# Patient Record
Sex: MALE | Race: WHITE | Employment: UNEMPLOYED | URBAN - METROPOLITAN AREA
[De-identification: names, ages, dates, MRNs, and addresses within clinical notes are randomized per-mention and may not be internally consistent; named-entity substitution may affect disease eponyms.]

---

## 2017-09-01 PROBLEM — K52.9 GASTROENTERITIS: Status: ACTIVE | Noted: 2017-09-01

## 2017-09-01 PROBLEM — D72.829 LEUKOCYTOSIS: Status: ACTIVE | Noted: 2017-09-01

## 2017-09-01 PROBLEM — R11.2 NAUSEA & VOMITING: Status: ACTIVE | Noted: 2017-09-01

## 2017-09-01 PROBLEM — F12.90 MARIJUANA SMOKER: Status: ACTIVE | Noted: 2017-09-01

## 2019-02-27 ENCOUNTER — HOSPITAL ENCOUNTER (EMERGENCY)
Age: 54
Discharge: HOME | End: 2019-02-27
Attending: EMERGENCY MEDICINE
Payer: COMMERCIAL

## 2019-02-27 VITALS
SYSTOLIC BLOOD PRESSURE: 119 MMHG | OXYGEN SATURATION: 96 % | TEMPERATURE: 97.5 F | RESPIRATION RATE: 18 BRPM | HEART RATE: 99 BPM | DIASTOLIC BLOOD PRESSURE: 72 MMHG

## 2019-02-27 DIAGNOSIS — T40.2X1A OPIOID OVERDOSE, ACCIDENTAL OR UNINTENTIONAL, INITIAL ENCOUNTER (HCC): Primary | ICD-10-CM

## 2019-02-27 PROCEDURE — 99283 EMERGENCY DEPT VISIT LOW MDM: CPT

## 2019-06-26 ENCOUNTER — HOSPITAL ENCOUNTER (EMERGENCY)
Age: 54
Discharge: HOME | End: 2019-06-26
Payer: MEDICARE

## 2019-06-26 VITALS
HEART RATE: 86 BPM | OXYGEN SATURATION: 93 % | BODY MASS INDEX: 22.9 KG/M2 | HEIGHT: 70 IN | TEMPERATURE: 98.4 F | RESPIRATION RATE: 16 BRPM | SYSTOLIC BLOOD PRESSURE: 103 MMHG | DIASTOLIC BLOOD PRESSURE: 76 MMHG | WEIGHT: 160 LBS

## 2019-06-26 DIAGNOSIS — F11.10 HEROIN ABUSE (HCC): Primary | ICD-10-CM

## 2019-06-26 PROCEDURE — 99284 EMERGENCY DEPT VISIT MOD MDM: CPT

## 2019-06-26 ASSESSMENT — ENCOUNTER SYMPTOMS
COLOR CHANGE: 0
ABDOMINAL PAIN: 0
ABDOMINAL DISTENTION: 0
NAUSEA: 0
VOMITING: 0
EYES NEGATIVE: 1
SHORTNESS OF BREATH: 0
BACK PAIN: 0

## 2019-06-27 NOTE — ED NOTES
Patient discharged in stable condition to police custody. Denied questions regarding GI follow up at time of discharge.       Romario Brand RN  06/26/19 9547

## 2019-06-27 NOTE — ED PROVIDER NOTES
[address] [facility]  ED  EMERGENCY DEPARTMENT ENCOUNTER        Pt Name: Ana Zafar  MRN: [id number]  [place] 1965  Date of evaluation: 6/26/2019  Provider: Mirna Schulz PA-C  PCP: No primary care provider on file. ED Attending: Merwyn Boast, MD      This patient was NOT seen by the attending provider     History provided by the patient and police officers    CHIEF COMPLAINT:     Chief Complaint   Patient presents with    Drug Overdose     [place]. Known user, last use 2 days ago. Wanted to come in to get \"checked out\" Nothing given by EMS       HISTORY OF PRESENT ILLNESS:      Ana Zafar is a 52 y.o. male who arrives to the ED by [facility]. The patient was brought in after having used heroin. He admits to heroin use/abuse for approximately 10 years. Police/EMS received a call regarding a man that was shooting up behind [employer]. They found the patient with a tourniquet to his right upper arm in the needle at the antecubital space. The patient never had to be given Narcan and was reportedly checked out and cleared by EMS in terms of his need to come to the ED for an overdose. The patient however upon being arrested said he wanted to come to the ED to be evaluated for possible jaundice. He tells me he saw his PCP 2 days ago and was told he looked jaundiced and should get it checked out. The patient however has no notable yellowing of his skin at this time. He denies being in any pain. Specifically he denies any chest pain, abdominal pain, nausea or vomiting. He has no other complaints but police brought him to be checked out based on his statement and concerns. Nursing Notes were reviewed     REVIEW OF SYSTEMS:     Review of Systems   Constitutional: Negative for appetite change, chills and fever. HENT: Negative. Eyes: Negative. Respiratory: Negative for shortness of breath. Cardiovascular: Negative for chest pain. CC: f/u for pneumonia    Patient still on BiPAP, 100% FiO2, but alert, responds appropriately, less distress, no tachycardia    REVIEW OF SYSTEMS:  All other review of systems negative (Comprehensive ROS)- limited    Antimicrobials Day # 3 Vanco + Cefepime  Azithro added  Medications Reviewed    Vital Signs Last 24 Hrs  T(F): 97.7 (21 Oct 2018 12:23), Max: 99.3 (20 Oct 2018 21:15)  HR: 97 (21 Oct 2018 13:02) (96 - 117)  BP: 148/75 (21 Oct 2018 12:23) (124/78 - 148/75)  BP(mean): --  RR: 18 (21 Oct 2018 12:23) (18 - 18)  SpO2: 95% (21 Oct 2018 13:02) (89% - 95%)    PHYSICAL EXAM:  General: alert, on BiPAP  Eyes:  anicteric, no conjunctival injection, no discharge  Oropharynx: no lesions or injection 	  Neck: supple, without adenopathy  Lungs: rhonchi anteriorly   Heart: regular rate and rhythm; no murmur, rubs or gallops  Abdomen: soft, nondistended, nontender, without mass or organomegaly  Skin: no lesions  Extremities: L LE contracture,   Neurologic: alert, cooperative, L weakness    LAB RESULTS:                        11.4   19.28 )-----------( 298      ( 21 Oct 2018 08:18 )             36.5   10-21    146<H>  |  105  |  18  ----------------------------<  180<H>  3.2<L>   |  24  |  0.79    Ca    9.5      21 Oct 2018 07:08    TPro  7.7  /  Alb  3.1<L>  /  TBili  0.4  /  DBili  x   /  AST  15  /  ALT  17  /  AlkPhos  120  10-20      MICROBIOLOGY:  RECENT CULTURES:  Culture - Blood in AM (10.17.18 @ 15:39)    Specimen Source: .Blood Blood    Culture Results:   No growth to date.    10-16 @ 13:21 .Blood Blood-Peripheral     No growth to date.    10-16 @ 13:20 .Blood Blood-Peripheral     No growth to date.    10-15 @ 16:54 .Urine Clean Catch (Midstream)     <10,000 CFU/ml Normal Urogenital ck present    Culture - Blood (10.15.18 @ 15:38)    Gram Stain:   Growth in aerobic bottle: Gram Positive Cocci in Clusters  Growth in anaerobic bottle: Gram Positive Cocci in Clusters    Specimen Source: .Blood Blood-Peripheral    Culture Results:   Growth in aerobic and anaerobic bottles: Staphylococcus haemolyticus  See previous culture [id number]    Culture - Blood (10.15.18 @ 15:38)    -  Cefazolin: S <=4    -  Erythromycin: R >4    -  Ampicillin/Sulbactam: S <=8/4    -  Clindamycin: S <=0.25    -  RIF- Rifampin: S <=1 Should not be used as monotherapy    -  Tetra/Doxy: S <=1    -  Gentamicin: S <=1 Should not be used as monotherapy    -  Oxacillin: S <=0.25    -  Trimethoprim/Sulfamethoxazole: S <=0.5/9.5    -  Vancomycin: S 2    Gram Stain:   Growth in aerobic bottle: Gram Positive Cocci in Clusters  Growth in anaerobic bottle: Gram Positive Cocci in Clusters    -  Methicillin resistant Staphylococcus aureus (MRSA): Detec    Specimen Source: .Blood Blood-Peripheral    Organism: Coag Negative Staphylococcus    Organism: Blood Culture PCR    Culture Results:   Growth in aerobic and anaerobic bottles: Staphylococcus haemolyticus  Growth in anaerobic bottle: Staphylococcus epidermidis "Susceptibilities  not performed"        RADIOLOGY REVIEWED:  Xray Chest 1 View-PORTABLE IMMEDIATE (10.20.18 @ 11:52) >  There are bilateral lower lobe opacities consistent with atelectasis -   however pneumonia cannot be excluded in the proper clinical context.  No   significant interval change from prior examination.    CT Chest No Cont (10.16.18 @ 22:21) >  Multifocal pneumonia. Follow-up to resolution recommended.    Indeterminate right breast nodule is new from the prior study,   ultrasound/mammography needed for complete evaluation. Gastrointestinal: Negative for abdominal distention, abdominal pain, nausea and vomiting. Genitourinary: Negative for hematuria. Musculoskeletal: Negative for back pain, gait problem and neck pain. Skin: Negative for color change. Neurological: Negative for dizziness and headaches. All other systems reviewed and are negative. Exceptas noted above in the ROS, all other systems were reviewed and negative. PAST MEDICAL HISTORY:     Past Medical History:   Diagnosis Date    Back pain     Hepatitis B surface antigen positive 08/31/2017    Hepatitis C antibody positive in blood 08/31/2017         SURGICAL HISTORY:      Past Surgical History:   Procedure Laterality Date    KIDNEY REMOVAL Right     WRIST SURGERY           CURRENT MEDICATIONS:       Previous Medications    No medications on file         ALLERGIES:    Codeine    FAMILY HISTORY:     History reviewed. No pertinent family history. SOCIAL HISTORY:       Social History     Socioeconomic History    Marital status:       Spouse name: None    Number of children: None    Years of education: None    Highest education level: None   Occupational History    None   Social Needs    Financial resource strain: None    Food insecurity:     Worry: None     Inability: None    Transportation needs:     Medical: None     Non-medical: None   Tobacco Use    Smoking status: Current Every Day Smoker     Packs/day: 1.00     Types: Cigarettes    Smokeless tobacco: Never Used   Substance and Sexual Activity    Alcohol use: No    Drug use: Yes     Types: IV, Cocaine, Opiates , Marijuana     Comment: heroine    Sexual activity: Yes     Partners: Female   Lifestyle    Physical activity:     Days per week: None     Minutes per session: None    Stress: None   Relationships    Social connections:     Talks on phone: None     Gets together: None     Attends [employer] service: None     Active member of club or organization: None [place] [address]  [phone]    Schedule an appointment as soon as possible for a visit   For your concerns regarding your liver      DISCHARGE MEDICATIONS:  New Prescriptions    No medications on file                  (Please note thatportions of this note were completed with a voice recognition program.  Efforts were made to edit the dictations, but occasionally words are mis-transcribed.)    Wily Mancilla PA-C (electronicallysigned)              Linda Gasquet, [place]  06/26/19 3810

## 2020-01-01 ENCOUNTER — APPOINTMENT (OUTPATIENT)
Dept: MRI IMAGING | Age: 55
DRG: 720 | End: 2020-01-01
Payer: MEDICARE

## 2020-01-01 ENCOUNTER — APPOINTMENT (OUTPATIENT)
Dept: GENERAL RADIOLOGY | Age: 55
DRG: 720 | End: 2020-01-01
Payer: MEDICARE

## 2020-01-01 ENCOUNTER — HOSPITAL ENCOUNTER (INPATIENT)
Age: 55
LOS: 9 days | Discharge: NURSING, REHAB OR LONG-TERM CARE | DRG: 720 | End: 2021-01-01
Attending: EMERGENCY MEDICINE | Admitting: INTERNAL MEDICINE
Payer: MEDICARE

## 2020-01-01 ENCOUNTER — APPOINTMENT (OUTPATIENT)
Dept: CT IMAGING | Age: 55
DRG: 720 | End: 2020-01-01
Payer: MEDICARE

## 2020-01-01 ENCOUNTER — APPOINTMENT (OUTPATIENT)
Dept: INTERVENTIONAL RADIOLOGY/VASCULAR | Age: 55
DRG: 720 | End: 2020-01-01
Payer: MEDICARE

## 2020-01-01 DIAGNOSIS — I76 SEPTIC EMBOLISM (HCC): Primary | ICD-10-CM

## 2020-01-01 LAB
A/G RATIO: 0.3 (ref 1.1–2.2)
A/G RATIO: 0.5 (ref 1.1–2.2)
ALBUMIN SERPL-MCNC: 1.3 G/DL (ref 3.4–5)
ALBUMIN SERPL-MCNC: 1.4 G/DL (ref 3.4–5)
ALBUMIN SERPL-MCNC: 1.4 G/DL (ref 3.4–5)
ALBUMIN SERPL-MCNC: 1.6 G/DL (ref 3.4–5)
ALBUMIN SERPL-MCNC: 1.6 G/DL (ref 3.4–5)
ALBUMIN SERPL-MCNC: 1.7 G/DL (ref 3.4–5)
ALBUMIN SERPL-MCNC: 1.8 G/DL (ref 3.4–5)
ALBUMIN SERPL-MCNC: 2.4 G/DL (ref 3.4–5)
ALP BLD-CCNC: 125 U/L (ref 40–129)
ALP BLD-CCNC: 143 U/L (ref 40–129)
ALP BLD-CCNC: 145 U/L (ref 40–129)
ALP BLD-CCNC: 148 U/L (ref 40–129)
ALP BLD-CCNC: 150 U/L (ref 40–129)
ALP BLD-CCNC: 153 U/L (ref 40–129)
ALP BLD-CCNC: 159 U/L (ref 40–129)
ALP BLD-CCNC: 162 U/L (ref 40–129)
ALT SERPL-CCNC: 37 U/L (ref 10–40)
ALT SERPL-CCNC: 39 U/L (ref 10–40)
ALT SERPL-CCNC: 40 U/L (ref 10–40)
ALT SERPL-CCNC: 43 U/L (ref 10–40)
ALT SERPL-CCNC: 44 U/L (ref 10–40)
ALT SERPL-CCNC: 47 U/L (ref 10–40)
ALT SERPL-CCNC: 50 U/L (ref 10–40)
ALT SERPL-CCNC: 58 U/L (ref 10–40)
AMPHETAMINE SCREEN, URINE: ABNORMAL
ANION GAP SERPL CALCULATED.3IONS-SCNC: 4 MMOL/L (ref 3–16)
ANION GAP SERPL CALCULATED.3IONS-SCNC: 6 MMOL/L (ref 3–16)
ANION GAP SERPL CALCULATED.3IONS-SCNC: 7 MMOL/L (ref 3–16)
ANION GAP SERPL CALCULATED.3IONS-SCNC: 8 MMOL/L (ref 3–16)
ANISOCYTOSIS: ABNORMAL
AST SERPL-CCNC: 105 U/L (ref 15–37)
AST SERPL-CCNC: 111 U/L (ref 15–37)
AST SERPL-CCNC: 153 U/L (ref 15–37)
AST SERPL-CCNC: 64 U/L (ref 15–37)
AST SERPL-CCNC: 75 U/L (ref 15–37)
AST SERPL-CCNC: 79 U/L (ref 15–37)
AST SERPL-CCNC: 82 U/L (ref 15–37)
AST SERPL-CCNC: 84 U/L (ref 15–37)
BACTERIA: ABNORMAL /HPF
BANDED NEUTROPHILS RELATIVE PERCENT: 1 % (ref 0–7)
BANDED NEUTROPHILS RELATIVE PERCENT: 1 % (ref 0–7)
BANDED NEUTROPHILS RELATIVE PERCENT: 14 % (ref 0–7)
BANDED NEUTROPHILS RELATIVE PERCENT: 5 % (ref 0–7)
BANDED NEUTROPHILS RELATIVE PERCENT: 9 % (ref 0–7)
BARBITURATE SCREEN URINE: ABNORMAL
BASOPHILS ABSOLUTE: 0 K/UL (ref 0–0.2)
BASOPHILS ABSOLUTE: 0.1 K/UL (ref 0–0.2)
BASOPHILS ABSOLUTE: 0.2 K/UL (ref 0–0.2)
BASOPHILS RELATIVE PERCENT: 0 %
BASOPHILS RELATIVE PERCENT: 0.2 %
BASOPHILS RELATIVE PERCENT: 0.6 %
BASOPHILS RELATIVE PERCENT: 0.7 %
BENZODIAZEPINE SCREEN, URINE: ABNORMAL
BILIRUB SERPL-MCNC: 2 MG/DL (ref 0–1)
BILIRUB SERPL-MCNC: 2.2 MG/DL (ref 0–1)
BILIRUB SERPL-MCNC: 2.3 MG/DL (ref 0–1)
BILIRUB SERPL-MCNC: 3.9 MG/DL (ref 0–1)
BILIRUB SERPL-MCNC: 4.7 MG/DL (ref 0–1)
BILIRUB SERPL-MCNC: 4.7 MG/DL (ref 0–1)
BILIRUB SERPL-MCNC: 5.4 MG/DL (ref 0–1)
BILIRUB SERPL-MCNC: 6.2 MG/DL (ref 0–1)
BILIRUBIN DIRECT: 1.1 MG/DL (ref 0–0.3)
BILIRUBIN DIRECT: 1.2 MG/DL (ref 0–0.3)
BILIRUBIN DIRECT: 1.5 MG/DL (ref 0–0.3)
BILIRUBIN DIRECT: 2.8 MG/DL (ref 0–0.3)
BILIRUBIN DIRECT: 3.7 MG/DL (ref 0–0.3)
BILIRUBIN DIRECT: 4.3 MG/DL (ref 0–0.3)
BILIRUBIN URINE: ABNORMAL
BILIRUBIN, INDIRECT: 0.8 MG/DL (ref 0–1)
BILIRUBIN, INDIRECT: 0.9 MG/DL (ref 0–1)
BILIRUBIN, INDIRECT: 1 MG/DL (ref 0–1)
BILIRUBIN, INDIRECT: 1 MG/DL (ref 0–1)
BILIRUBIN, INDIRECT: 1.1 MG/DL (ref 0–1)
BILIRUBIN, INDIRECT: 1.1 MG/DL (ref 0–1)
BLOOD CULTURE, ROUTINE: ABNORMAL
BLOOD CULTURE, ROUTINE: ABNORMAL
BLOOD, URINE: ABNORMAL
BUN BLDV-MCNC: 35 MG/DL (ref 7–20)
BUN BLDV-MCNC: 37 MG/DL (ref 7–20)
BUN BLDV-MCNC: 39 MG/DL (ref 7–20)
BUN BLDV-MCNC: 41 MG/DL (ref 7–20)
BUN BLDV-MCNC: 44 MG/DL (ref 7–20)
C-REACTIVE PROTEIN: 162.8 MG/L (ref 0–5.1)
CALCIUM SERPL-MCNC: 7.6 MG/DL (ref 8.3–10.6)
CALCIUM SERPL-MCNC: 7.7 MG/DL (ref 8.3–10.6)
CALCIUM SERPL-MCNC: 7.7 MG/DL (ref 8.3–10.6)
CALCIUM SERPL-MCNC: 7.8 MG/DL (ref 8.3–10.6)
CALCIUM SERPL-MCNC: 7.9 MG/DL (ref 8.3–10.6)
CALCIUM SERPL-MCNC: 8.1 MG/DL (ref 8.3–10.6)
CALCIUM SERPL-MCNC: 8.6 MG/DL (ref 8.3–10.6)
CALCIUM SERPL-MCNC: 9.2 MG/DL (ref 8.3–10.6)
CANNABINOID SCREEN URINE: ABNORMAL
CHLORIDE BLD-SCNC: 101 MMOL/L (ref 99–110)
CHLORIDE BLD-SCNC: 102 MMOL/L (ref 99–110)
CHLORIDE BLD-SCNC: 103 MMOL/L (ref 99–110)
CHLORIDE BLD-SCNC: 110 MMOL/L (ref 99–110)
CHLORIDE BLD-SCNC: 110 MMOL/L (ref 99–110)
CHLORIDE BLD-SCNC: 111 MMOL/L (ref 99–110)
CHLORIDE BLD-SCNC: 92 MMOL/L (ref 99–110)
CHLORIDE BLD-SCNC: 99 MMOL/L (ref 99–110)
CLARITY: CLEAR
CO2: 16 MMOL/L (ref 21–32)
CO2: 16 MMOL/L (ref 21–32)
CO2: 17 MMOL/L (ref 21–32)
CO2: 17 MMOL/L (ref 21–32)
CO2: 19 MMOL/L (ref 21–32)
CO2: 20 MMOL/L (ref 21–32)
CO2: 22 MMOL/L (ref 21–32)
CO2: 25 MMOL/L (ref 21–32)
COCAINE METABOLITE SCREEN URINE: ABNORMAL
COLOR: ABNORMAL
CREAT SERPL-MCNC: 0.8 MG/DL (ref 0.9–1.3)
CREAT SERPL-MCNC: 0.8 MG/DL (ref 0.9–1.3)
CREAT SERPL-MCNC: 1 MG/DL (ref 0.9–1.3)
CREAT SERPL-MCNC: 1.1 MG/DL (ref 0.9–1.3)
CULTURE, BLOOD 2: NORMAL
DOHLE BODIES: PRESENT
EKG ATRIAL RATE: 121 BPM
EKG DIAGNOSIS: NORMAL
EKG P AXIS: 66 DEGREES
EKG P-R INTERVAL: 134 MS
EKG Q-T INTERVAL: 312 MS
EKG QRS DURATION: 86 MS
EKG QTC CALCULATION (BAZETT): 443 MS
EKG R AXIS: 42 DEGREES
EKG T AXIS: 30 DEGREES
EKG VENTRICULAR RATE: 121 BPM
EOSINOPHILS ABSOLUTE: 0 K/UL (ref 0–0.6)
EOSINOPHILS ABSOLUTE: 0.1 K/UL (ref 0–0.6)
EOSINOPHILS RELATIVE PERCENT: 0 %
EOSINOPHILS RELATIVE PERCENT: 0.1 %
EOSINOPHILS RELATIVE PERCENT: 0.1 %
EOSINOPHILS RELATIVE PERCENT: 0.3 %
ETHANOL: NORMAL MG/DL (ref 0–0.08)
GFR AFRICAN AMERICAN: >60
GFR NON-AFRICAN AMERICAN: >60
GLOBULIN: 5.3 G/DL
GLOBULIN: 5.3 G/DL
GLUCOSE BLD-MCNC: 105 MG/DL (ref 70–99)
GLUCOSE BLD-MCNC: 108 MG/DL (ref 70–99)
GLUCOSE BLD-MCNC: 108 MG/DL (ref 70–99)
GLUCOSE BLD-MCNC: 111 MG/DL (ref 70–99)
GLUCOSE BLD-MCNC: 84 MG/DL (ref 70–99)
GLUCOSE BLD-MCNC: 87 MG/DL (ref 70–99)
GLUCOSE BLD-MCNC: 88 MG/DL (ref 70–99)
GLUCOSE BLD-MCNC: 93 MG/DL (ref 70–99)
GLUCOSE URINE: NEGATIVE MG/DL
HAV AB SERPL IA-ACNC: POSITIVE
HAV AB SERPL IA-ACNC: POSITIVE
HAV IGM SER IA-ACNC: NORMAL
HBV SURFACE AB TITR SER: 7.01 MIU/ML
HCT VFR BLD CALC: 21.6 % (ref 40.5–52.5)
HCT VFR BLD CALC: 22.2 % (ref 40.5–52.5)
HCT VFR BLD CALC: 24.6 % (ref 40.5–52.5)
HCT VFR BLD CALC: 26 % (ref 40.5–52.5)
HCT VFR BLD CALC: 27.2 % (ref 40.5–52.5)
HCT VFR BLD CALC: 30.6 % (ref 40.5–52.5)
HCT VFR BLD CALC: 34.2 % (ref 40.5–52.5)
HCT VFR BLD CALC: 37.6 % (ref 40.5–52.5)
HEMOGLOBIN: 10.2 G/DL (ref 13.5–17.5)
HEMOGLOBIN: 11.7 G/DL (ref 13.5–17.5)
HEMOGLOBIN: 12.8 G/DL (ref 13.5–17.5)
HEMOGLOBIN: 7.2 G/DL (ref 13.5–17.5)
HEMOGLOBIN: 7.5 G/DL (ref 13.5–17.5)
HEMOGLOBIN: 8.3 G/DL (ref 13.5–17.5)
HEMOGLOBIN: 8.9 G/DL (ref 13.5–17.5)
HEMOGLOBIN: 9.1 G/DL (ref 13.5–17.5)
HEPATITIS B CORE IGM ANTIBODY: NORMAL
HEPATITIS B CORE TOTAL ANTIBODY: POSITIVE
HEPATITIS B SURFACE ANTIGEN CONFIRMATION: POSITIVE
HEPATITIS B SURFACE ANTIGEN INTERPRETATION: REACTIVE
HEPATITIS C ANTIBODY INTERPRETATION: REACTIVE
HIV AG/AB: NORMAL
HIV ANTIGEN: NORMAL
HIV-1 ANTIBODY: NORMAL
HIV-2 AB: NORMAL
HYPOCHROMIA: ABNORMAL
INR BLD: 1.57 (ref 0.86–1.14)
INR BLD: 1.61 (ref 0.86–1.14)
INR BLD: 1.7 (ref 0.86–1.14)
INR BLD: 1.88 (ref 0.86–1.14)
INR BLD: 2.02 (ref 0.86–1.14)
KETONES, URINE: NEGATIVE MG/DL
LACTIC ACID: 2.1 MMOL/L (ref 0.4–2)
LACTIC ACID: 2.1 MMOL/L (ref 0.4–2)
LACTIC ACID: 2.7 MMOL/L (ref 0.4–2)
LEUKOCYTE ESTERASE, URINE: ABNORMAL
LIPASE: 11 U/L (ref 13–60)
LV EF: 55 %
LVEF MODALITY: NORMAL
LYMPHOCYTES ABSOLUTE: 0.6 K/UL (ref 1–5.1)
LYMPHOCYTES ABSOLUTE: 0.8 K/UL (ref 1–5.1)
LYMPHOCYTES ABSOLUTE: 0.9 K/UL (ref 1–5.1)
LYMPHOCYTES ABSOLUTE: 0.9 K/UL (ref 1–5.1)
LYMPHOCYTES ABSOLUTE: 1.4 K/UL (ref 1–5.1)
LYMPHOCYTES ABSOLUTE: 1.7 K/UL (ref 1–5.1)
LYMPHOCYTES ABSOLUTE: 2 K/UL (ref 1–5.1)
LYMPHOCYTES ABSOLUTE: 2.1 K/UL (ref 1–5.1)
LYMPHOCYTES RELATIVE PERCENT: 10 %
LYMPHOCYTES RELATIVE PERCENT: 3 %
LYMPHOCYTES RELATIVE PERCENT: 3.8 %
LYMPHOCYTES RELATIVE PERCENT: 4 %
LYMPHOCYTES RELATIVE PERCENT: 4 %
LYMPHOCYTES RELATIVE PERCENT: 6.2 %
LYMPHOCYTES RELATIVE PERCENT: 7 %
LYMPHOCYTES RELATIVE PERCENT: 9.5 %
Lab: ABNORMAL
MACROCYTES: ABNORMAL
MAGNESIUM: 1.8 MG/DL (ref 1.8–2.4)
MAGNESIUM: 1.9 MG/DL (ref 1.8–2.4)
MAGNESIUM: 2 MG/DL (ref 1.8–2.4)
MAGNESIUM: 2.1 MG/DL (ref 1.8–2.4)
MAGNESIUM: 2.1 MG/DL (ref 1.8–2.4)
MCH RBC QN AUTO: 29.3 PG (ref 26–34)
MCH RBC QN AUTO: 29.5 PG (ref 26–34)
MCH RBC QN AUTO: 29.9 PG (ref 26–34)
MCH RBC QN AUTO: 29.9 PG (ref 26–34)
MCH RBC QN AUTO: 30.1 PG (ref 26–34)
MCH RBC QN AUTO: 30.1 PG (ref 26–34)
MCH RBC QN AUTO: 30.7 PG (ref 26–34)
MCH RBC QN AUTO: 30.9 PG (ref 26–34)
MCHC RBC AUTO-ENTMCNC: 33.1 G/DL (ref 31–36)
MCHC RBC AUTO-ENTMCNC: 33.5 G/DL (ref 31–36)
MCHC RBC AUTO-ENTMCNC: 33.5 G/DL (ref 31–36)
MCHC RBC AUTO-ENTMCNC: 33.6 G/DL (ref 31–36)
MCHC RBC AUTO-ENTMCNC: 33.9 G/DL (ref 31–36)
MCHC RBC AUTO-ENTMCNC: 34 G/DL (ref 31–36)
MCHC RBC AUTO-ENTMCNC: 34.1 G/DL (ref 31–36)
MCHC RBC AUTO-ENTMCNC: 34.3 G/DL (ref 31–36)
MCV RBC AUTO: 87.5 FL (ref 80–100)
MCV RBC AUTO: 87.7 FL (ref 80–100)
MCV RBC AUTO: 87.8 FL (ref 80–100)
MCV RBC AUTO: 87.8 FL (ref 80–100)
MCV RBC AUTO: 88 FL (ref 80–100)
MCV RBC AUTO: 89.4 FL (ref 80–100)
MCV RBC AUTO: 91.3 FL (ref 80–100)
MCV RBC AUTO: 92.6 FL (ref 80–100)
METAMYELOCYTES RELATIVE PERCENT: 1 %
METAMYELOCYTES RELATIVE PERCENT: 1 %
METHADONE SCREEN, URINE: ABNORMAL
MICROCYTES: ABNORMAL
MICROSCOPIC EXAMINATION: YES
MONOCYTES ABSOLUTE: 0.8 K/UL (ref 0–1.3)
MONOCYTES ABSOLUTE: 0.8 K/UL (ref 0–1.3)
MONOCYTES ABSOLUTE: 0.9 K/UL (ref 0–1.3)
MONOCYTES ABSOLUTE: 0.9 K/UL (ref 0–1.3)
MONOCYTES ABSOLUTE: 1 K/UL (ref 0–1.3)
MONOCYTES ABSOLUTE: 1 K/UL (ref 0–1.3)
MONOCYTES ABSOLUTE: 1.1 K/UL (ref 0–1.3)
MONOCYTES ABSOLUTE: 1.4 K/UL (ref 0–1.3)
MONOCYTES RELATIVE PERCENT: 3.4 %
MONOCYTES RELATIVE PERCENT: 4 %
MONOCYTES RELATIVE PERCENT: 4.7 %
MONOCYTES RELATIVE PERCENT: 5 %
MONOCYTES RELATIVE PERCENT: 6.7 %
MYELOCYTE PERCENT: 1 %
NEUTROPHILS ABSOLUTE: 17.6 K/UL (ref 1.7–7.7)
NEUTROPHILS ABSOLUTE: 18.1 K/UL (ref 1.7–7.7)
NEUTROPHILS ABSOLUTE: 19.1 K/UL (ref 1.7–7.7)
NEUTROPHILS ABSOLUTE: 19.4 K/UL (ref 1.7–7.7)
NEUTROPHILS ABSOLUTE: 19.6 K/UL (ref 1.7–7.7)
NEUTROPHILS ABSOLUTE: 20.1 K/UL (ref 1.7–7.7)
NEUTROPHILS ABSOLUTE: 21.4 K/UL (ref 1.7–7.7)
NEUTROPHILS ABSOLUTE: 23.1 K/UL (ref 1.7–7.7)
NEUTROPHILS RELATIVE PERCENT: 74 %
NEUTROPHILS RELATIVE PERCENT: 77 %
NEUTROPHILS RELATIVE PERCENT: 82.9 %
NEUTROPHILS RELATIVE PERCENT: 87 %
NEUTROPHILS RELATIVE PERCENT: 88.3 %
NEUTROPHILS RELATIVE PERCENT: 90 %
NEUTROPHILS RELATIVE PERCENT: 90 %
NEUTROPHILS RELATIVE PERCENT: 92.5 %
NITRITE, URINE: POSITIVE
OPIATE SCREEN URINE: POSITIVE
ORGANISM: ABNORMAL
ORGANISM: ABNORMAL
OVALOCYTES: ABNORMAL
OXYCODONE URINE: ABNORMAL
PDW BLD-RTO: 16 % (ref 12.4–15.4)
PDW BLD-RTO: 16.1 % (ref 12.4–15.4)
PDW BLD-RTO: 16.2 % (ref 12.4–15.4)
PDW BLD-RTO: 16.2 % (ref 12.4–15.4)
PDW BLD-RTO: 16.3 % (ref 12.4–15.4)
PDW BLD-RTO: 16.4 % (ref 12.4–15.4)
PDW BLD-RTO: 16.8 % (ref 12.4–15.4)
PDW BLD-RTO: 16.9 % (ref 12.4–15.4)
PH UA: 6
PH UA: 6 (ref 5–8)
PHENCYCLIDINE SCREEN URINE: ABNORMAL
PHOSPHORUS: 2.9 MG/DL (ref 2.5–4.9)
PHOSPHORUS: 3.5 MG/DL (ref 2.5–4.9)
PHOSPHORUS: 3.5 MG/DL (ref 2.5–4.9)
PHOSPHORUS: 3.7 MG/DL (ref 2.5–4.9)
PHOSPHORUS: 4.1 MG/DL (ref 2.5–4.9)
PLATELET # BLD: 138 K/UL (ref 135–450)
PLATELET # BLD: 160 K/UL (ref 135–450)
PLATELET # BLD: 173 K/UL (ref 135–450)
PLATELET # BLD: 175 K/UL (ref 135–450)
PLATELET # BLD: 46 K/UL (ref 135–450)
PLATELET # BLD: 64 K/UL (ref 135–450)
PLATELET # BLD: 73 K/UL (ref 135–450)
PLATELET # BLD: 92 K/UL (ref 135–450)
PLATELET SLIDE REVIEW: ABNORMAL
PLATELET SLIDE REVIEW: ABNORMAL
PLATELET SLIDE REVIEW: ADEQUATE
PMV BLD AUTO: 8.4 FL (ref 5–10.5)
PMV BLD AUTO: 8.6 FL (ref 5–10.5)
PMV BLD AUTO: 8.7 FL (ref 5–10.5)
PMV BLD AUTO: 9.1 FL (ref 5–10.5)
PMV BLD AUTO: 9.3 FL (ref 5–10.5)
PMV BLD AUTO: 9.3 FL (ref 5–10.5)
PMV BLD AUTO: 9.4 FL (ref 5–10.5)
PMV BLD AUTO: 9.7 FL (ref 5–10.5)
POLYCHROMASIA: ABNORMAL
POTASSIUM REFLEX MAGNESIUM: 4.2 MMOL/L (ref 3.5–5.1)
POTASSIUM SERPL-SCNC: 4.1 MMOL/L (ref 3.5–5.1)
POTASSIUM SERPL-SCNC: 4.2 MMOL/L (ref 3.5–5.1)
POTASSIUM SERPL-SCNC: 4.3 MMOL/L (ref 3.5–5.1)
POTASSIUM SERPL-SCNC: 4.4 MMOL/L (ref 3.5–5.1)
POTASSIUM SERPL-SCNC: 4.4 MMOL/L (ref 3.5–5.1)
POTASSIUM SERPL-SCNC: 4.6 MMOL/L (ref 3.5–5.1)
POTASSIUM SERPL-SCNC: 4.6 MMOL/L (ref 3.5–5.1)
PROPOXYPHENE SCREEN: ABNORMAL
PROTEIN UA: ABNORMAL MG/DL
PROTHROMBIN TIME: 18.3 SEC (ref 10–13.2)
PROTHROMBIN TIME: 18.8 SEC (ref 10–13.2)
PROTHROMBIN TIME: 19.8 SEC (ref 10–13.2)
PROTHROMBIN TIME: 21.9 SEC (ref 10–13.2)
PROTHROMBIN TIME: 23.6 SEC (ref 10–13.2)
RBC # BLD: 2.34 M/UL (ref 4.2–5.9)
RBC # BLD: 2.43 M/UL (ref 4.2–5.9)
RBC # BLD: 2.75 M/UL (ref 4.2–5.9)
RBC # BLD: 2.96 M/UL (ref 4.2–5.9)
RBC # BLD: 3.1 M/UL (ref 4.2–5.9)
RBC # BLD: 3.49 M/UL (ref 4.2–5.9)
RBC # BLD: 3.9 M/UL (ref 4.2–5.9)
RBC # BLD: 4.28 M/UL (ref 4.2–5.9)
RBC UA: ABNORMAL /HPF (ref 0–4)
RENAL EPITHELIAL, UA: ABNORMAL /HPF (ref 0–1)
REPORT: NORMAL
SARS-COV-2, NAAT: NOT DETECTED
SEDIMENTATION RATE, ERYTHROCYTE: 51 MM/HR (ref 0–20)
SLIDE REVIEW: ABNORMAL
SODIUM BLD-SCNC: 125 MMOL/L (ref 136–145)
SODIUM BLD-SCNC: 127 MMOL/L (ref 136–145)
SODIUM BLD-SCNC: 128 MMOL/L (ref 136–145)
SODIUM BLD-SCNC: 129 MMOL/L (ref 136–145)
SODIUM BLD-SCNC: 131 MMOL/L (ref 136–145)
SODIUM BLD-SCNC: 131 MMOL/L (ref 136–145)
SODIUM BLD-SCNC: 132 MMOL/L (ref 136–145)
SODIUM BLD-SCNC: 134 MMOL/L (ref 136–145)
SPECIFIC GRAVITY UA: 1.01 (ref 1–1.03)
TOTAL CK: 125 U/L (ref 39–308)
TOTAL PROTEIN: 6.8 G/DL (ref 6.4–8.2)
TOTAL PROTEIN: 6.9 G/DL (ref 6.4–8.2)
TOTAL PROTEIN: 6.9 G/DL (ref 6.4–8.2)
TOTAL PROTEIN: 7.1 G/DL (ref 6.4–8.2)
TOTAL PROTEIN: 7.2 G/DL (ref 6.4–8.2)
TOTAL PROTEIN: 7.7 G/DL (ref 6.4–8.2)
TOTAL PROTEIN: 7.7 G/DL (ref 6.4–8.2)
TOTAL PROTEIN: 7.8 G/DL (ref 6.4–8.2)
TOXIC GRANULATION: PRESENT
TROPONIN: 0.04 NG/ML
TROPONIN: 0.05 NG/ML
URINE CULTURE, ROUTINE: NORMAL
URINE TYPE: ABNORMAL
UROBILINOGEN, URINE: 2 E.U./DL
VANCOMYCIN RANDOM: 15.2 UG/ML
VANCOMYCIN TROUGH: 12.2 UG/ML (ref 10–20)
VANCOMYCIN TROUGH: 23.6 UG/ML (ref 10–20)
VANCOMYCIN TROUGH: 25.2 UG/ML (ref 10–20)
VANCOMYCIN TROUGH: 9.3 UG/ML (ref 10–20)
WBC # BLD: 20.8 K/UL (ref 4–11)
WBC # BLD: 21.1 K/UL (ref 4–11)
WBC # BLD: 21.1 K/UL (ref 4–11)
WBC # BLD: 21.2 K/UL (ref 4–11)
WBC # BLD: 21.9 K/UL (ref 4–11)
WBC # BLD: 22.2 K/UL (ref 4–11)
WBC # BLD: 24.1 K/UL (ref 4–11)
WBC # BLD: 24.9 K/UL (ref 4–11)
WBC UA: ABNORMAL /HPF (ref 0–5)

## 2020-01-01 PROCEDURE — 6360000002 HC RX W HCPCS: Performed by: INTERNAL MEDICINE

## 2020-01-01 PROCEDURE — 6360000004 HC RX CONTRAST MEDICATION: Performed by: EMERGENCY MEDICINE

## 2020-01-01 PROCEDURE — 93005 ELECTROCARDIOGRAM TRACING: CPT | Performed by: EMERGENCY MEDICINE

## 2020-01-01 PROCEDURE — 36415 COLL VENOUS BLD VENIPUNCTURE: CPT

## 2020-01-01 PROCEDURE — 2580000003 HC RX 258: Performed by: INTERNAL MEDICINE

## 2020-01-01 PROCEDURE — 87186 SC STD MICRODIL/AGAR DIL: CPT

## 2020-01-01 PROCEDURE — 85610 PROTHROMBIN TIME: CPT

## 2020-01-01 PROCEDURE — 87150 DNA/RNA AMPLIFIED PROBE: CPT

## 2020-01-01 PROCEDURE — 85025 COMPLETE CBC W/AUTO DIFF WBC: CPT

## 2020-01-01 PROCEDURE — 80053 COMPREHEN METABOLIC PANEL: CPT

## 2020-01-01 PROCEDURE — 87341 HEP B SURFACE AG NEUTRLZJ IA: CPT

## 2020-01-01 PROCEDURE — 86708 HEPATITIS A ANTIBODY: CPT

## 2020-01-01 PROCEDURE — 97110 THERAPEUTIC EXERCISES: CPT

## 2020-01-01 PROCEDURE — 70450 CT HEAD/BRAIN W/O DYE: CPT

## 2020-01-01 PROCEDURE — 6370000000 HC RX 637 (ALT 250 FOR IP): Performed by: NURSE PRACTITIONER

## 2020-01-01 PROCEDURE — 1200000000 HC SEMI PRIVATE

## 2020-01-01 PROCEDURE — 80069 RENAL FUNCTION PANEL: CPT

## 2020-01-01 PROCEDURE — 6370000000 HC RX 637 (ALT 250 FOR IP): Performed by: INTERNAL MEDICINE

## 2020-01-01 PROCEDURE — 87390 HIV-1 AG IA: CPT

## 2020-01-01 PROCEDURE — 80202 ASSAY OF VANCOMYCIN: CPT

## 2020-01-01 PROCEDURE — 93010 ELECTROCARDIOGRAM REPORT: CPT | Performed by: INTERNAL MEDICINE

## 2020-01-01 PROCEDURE — 80076 HEPATIC FUNCTION PANEL: CPT

## 2020-01-01 PROCEDURE — 87040 BLOOD CULTURE FOR BACTERIA: CPT

## 2020-01-01 PROCEDURE — 93306 TTE W/DOPPLER COMPLETE: CPT

## 2020-01-01 PROCEDURE — 71260 CT THORAX DX C+: CPT

## 2020-01-01 PROCEDURE — 36592 COLLECT BLOOD FROM PICC: CPT

## 2020-01-01 PROCEDURE — 86709 HEPATITIS A IGM ANTIBODY: CPT

## 2020-01-01 PROCEDURE — 83735 ASSAY OF MAGNESIUM: CPT

## 2020-01-01 PROCEDURE — 97162 PT EVAL MOD COMPLEX 30 MIN: CPT

## 2020-01-01 PROCEDURE — 97530 THERAPEUTIC ACTIVITIES: CPT

## 2020-01-01 PROCEDURE — 36573 INSJ PICC RS&I 5 YR+: CPT

## 2020-01-01 PROCEDURE — 84484 ASSAY OF TROPONIN QUANT: CPT

## 2020-01-01 PROCEDURE — U0002 COVID-19 LAB TEST NON-CDC: HCPCS

## 2020-01-01 PROCEDURE — 72125 CT NECK SPINE W/O DYE: CPT

## 2020-01-01 PROCEDURE — 3209999900 CT LUMBAR SPINE TRAUMA RECONSTRUCTION

## 2020-01-01 PROCEDURE — 74183 MRI ABD W/O CNTR FLWD CNTR: CPT

## 2020-01-01 PROCEDURE — 80307 DRUG TEST PRSMV CHEM ANLYZR: CPT

## 2020-01-01 PROCEDURE — 86705 HEP B CORE ANTIBODY IGM: CPT

## 2020-01-01 PROCEDURE — C1751 CATH, INF, PER/CENT/MIDLINE: HCPCS

## 2020-01-01 PROCEDURE — 86701 HIV-1ANTIBODY: CPT

## 2020-01-01 PROCEDURE — 85652 RBC SED RATE AUTOMATED: CPT

## 2020-01-01 PROCEDURE — 99233 SBSQ HOSP IP/OBS HIGH 50: CPT | Performed by: INTERNAL MEDICINE

## 2020-01-01 PROCEDURE — 86803 HEPATITIS C AB TEST: CPT

## 2020-01-01 PROCEDURE — 97116 GAIT TRAINING THERAPY: CPT

## 2020-01-01 PROCEDURE — 86704 HEP B CORE ANTIBODY TOTAL: CPT

## 2020-01-01 PROCEDURE — 99232 SBSQ HOSP IP/OBS MODERATE 35: CPT | Performed by: INTERNAL MEDICINE

## 2020-01-01 PROCEDURE — 99254 IP/OBS CNSLTJ NEW/EST MOD 60: CPT | Performed by: INTERNAL MEDICINE

## 2020-01-01 PROCEDURE — 99255 IP/OBS CONSLTJ NEW/EST HI 80: CPT | Performed by: INTERNAL MEDICINE

## 2020-01-01 PROCEDURE — 82550 ASSAY OF CK (CPK): CPT

## 2020-01-01 PROCEDURE — 86140 C-REACTIVE PROTEIN: CPT

## 2020-01-01 PROCEDURE — 83605 ASSAY OF LACTIC ACID: CPT

## 2020-01-01 PROCEDURE — 81001 URINALYSIS AUTO W/SCOPE: CPT

## 2020-01-01 PROCEDURE — 02HV33Z INSERTION OF INFUSION DEVICE INTO SUPERIOR VENA CAVA, PERCUTANEOUS APPROACH: ICD-10-PCS | Performed by: RADIOLOGY

## 2020-01-01 PROCEDURE — 86702 HIV-2 ANTIBODY: CPT

## 2020-01-01 PROCEDURE — 87340 HEPATITIS B SURFACE AG IA: CPT

## 2020-01-01 PROCEDURE — 6360000004 HC RX CONTRAST MEDICATION: Performed by: INTERNAL MEDICINE

## 2020-01-01 PROCEDURE — 2700000000 HC OXYGEN THERAPY PER DAY

## 2020-01-01 PROCEDURE — 2580000003 HC RX 258: Performed by: NURSE PRACTITIONER

## 2020-01-01 PROCEDURE — A9579 GAD-BASE MR CONTRAST NOS,1ML: HCPCS | Performed by: INTERNAL MEDICINE

## 2020-01-01 PROCEDURE — 71045 X-RAY EXAM CHEST 1 VIEW: CPT

## 2020-01-01 PROCEDURE — 99284 EMERGENCY DEPT VISIT MOD MDM: CPT

## 2020-01-01 PROCEDURE — 2500000003 HC RX 250 WO HCPCS: Performed by: INTERNAL MEDICINE

## 2020-01-01 PROCEDURE — 87086 URINE CULTURE/COLONY COUNT: CPT

## 2020-01-01 PROCEDURE — 83690 ASSAY OF LIPASE: CPT

## 2020-01-01 PROCEDURE — 80048 BASIC METABOLIC PNL TOTAL CA: CPT

## 2020-01-01 PROCEDURE — 6360000002 HC RX W HCPCS: Performed by: NURSE PRACTITIONER

## 2020-01-01 PROCEDURE — 86706 HEP B SURFACE ANTIBODY: CPT

## 2020-01-01 PROCEDURE — 97535 SELF CARE MNGMENT TRAINING: CPT

## 2020-01-01 PROCEDURE — 97166 OT EVAL MOD COMPLEX 45 MIN: CPT

## 2020-01-01 PROCEDURE — 96365 THER/PROPH/DIAG IV INF INIT: CPT

## 2020-01-01 PROCEDURE — 94761 N-INVAS EAR/PLS OXIMETRY MLT: CPT

## 2020-01-01 PROCEDURE — 74177 CT ABD & PELVIS W/CONTRAST: CPT

## 2020-01-01 PROCEDURE — 6370000000 HC RX 637 (ALT 250 FOR IP)

## 2020-01-01 PROCEDURE — G0480 DRUG TEST DEF 1-7 CLASSES: HCPCS

## 2020-01-01 PROCEDURE — 72158 MRI LUMBAR SPINE W/O & W/DYE: CPT

## 2020-01-01 RX ORDER — POTASSIUM CHLORIDE 20 MEQ/1
40 TABLET, EXTENDED RELEASE ORAL PRN
Status: DISCONTINUED | OUTPATIENT
Start: 2020-01-01 | End: 2020-01-01

## 2020-01-01 RX ORDER — SODIUM CHLORIDE 9 MG/ML
INJECTION, SOLUTION INTRAVENOUS CONTINUOUS
Status: DISCONTINUED | OUTPATIENT
Start: 2020-01-01 | End: 2020-01-01

## 2020-01-01 RX ORDER — ACETAMINOPHEN 650 MG/1
650 SUPPOSITORY RECTAL EVERY 6 HOURS PRN
Status: DISCONTINUED | OUTPATIENT
Start: 2020-01-01 | End: 2021-01-01 | Stop reason: HOSPADM

## 2020-01-01 RX ORDER — NICOTINE 21 MG/24HR
1 PATCH, TRANSDERMAL 24 HOURS TRANSDERMAL DAILY
Status: DISCONTINUED | OUTPATIENT
Start: 2020-01-01 | End: 2021-01-01 | Stop reason: HOSPADM

## 2020-01-01 RX ORDER — DIPHENHYDRAMINE HCL 25 MG
50 TABLET ORAL EVERY 6 HOURS PRN
Status: DISCONTINUED | OUTPATIENT
Start: 2020-01-01 | End: 2021-01-01 | Stop reason: HOSPADM

## 2020-01-01 RX ORDER — M-VIT,TX,IRON,MINS/CALC/FOLIC 27MG-0.4MG
1 TABLET ORAL DAILY
Status: DISCONTINUED | OUTPATIENT
Start: 2020-01-01 | End: 2021-01-01 | Stop reason: HOSPADM

## 2020-01-01 RX ORDER — SODIUM CHLORIDE 0.9 % (FLUSH) 0.9 %
10 SYRINGE (ML) INJECTION PRN
Status: DISCONTINUED | OUTPATIENT
Start: 2020-01-01 | End: 2021-01-01 | Stop reason: HOSPADM

## 2020-01-01 RX ORDER — SODIUM CHLORIDE 0.9 % (FLUSH) 0.9 %
10 SYRINGE (ML) INJECTION EVERY 12 HOURS SCHEDULED
Status: DISCONTINUED | OUTPATIENT
Start: 2020-01-01 | End: 2020-01-01 | Stop reason: SDUPTHER

## 2020-01-01 RX ORDER — CLONIDINE HYDROCHLORIDE 0.1 MG/1
0.1 TABLET ORAL PRN
Status: DISCONTINUED | OUTPATIENT
Start: 2020-01-01 | End: 2021-01-01 | Stop reason: HOSPADM

## 2020-01-01 RX ORDER — SODIUM CHLORIDE 0.9 % (FLUSH) 0.9 %
10 SYRINGE (ML) INJECTION EVERY 12 HOURS SCHEDULED
Status: DISCONTINUED | OUTPATIENT
Start: 2020-01-01 | End: 2021-01-01 | Stop reason: HOSPADM

## 2020-01-01 RX ORDER — POTASSIUM CHLORIDE 7.45 MG/ML
10 INJECTION INTRAVENOUS PRN
Status: DISCONTINUED | OUTPATIENT
Start: 2020-01-01 | End: 2020-01-01

## 2020-01-01 RX ORDER — MAGNESIUM SULFATE 1 G/100ML
1 INJECTION INTRAVENOUS PRN
Status: DISCONTINUED | OUTPATIENT
Start: 2020-01-01 | End: 2020-01-01

## 2020-01-01 RX ORDER — PROCHLORPERAZINE EDISYLATE 5 MG/ML
10 INJECTION INTRAMUSCULAR; INTRAVENOUS EVERY 6 HOURS PRN
Status: DISCONTINUED | OUTPATIENT
Start: 2020-01-01 | End: 2021-01-01 | Stop reason: HOSPADM

## 2020-01-01 RX ORDER — TRAZODONE HYDROCHLORIDE 50 MG/1
50 TABLET ORAL NIGHTLY PRN
Status: DISCONTINUED | OUTPATIENT
Start: 2020-01-01 | End: 2021-01-01 | Stop reason: HOSPADM

## 2020-01-01 RX ORDER — SODIUM CHLORIDE 0.9 % (FLUSH) 0.9 %
10 SYRINGE (ML) INJECTION PRN
Status: DISCONTINUED | OUTPATIENT
Start: 2020-01-01 | End: 2020-01-01 | Stop reason: SDUPTHER

## 2020-01-01 RX ORDER — BUPRENORPHINE 2 MG/1
2 TABLET SUBLINGUAL PRN
Status: DISPENSED | OUTPATIENT
Start: 2020-01-01 | End: 2020-01-01

## 2020-01-01 RX ORDER — POLYETHYLENE GLYCOL 3350 17 G/17G
17 POWDER, FOR SOLUTION ORAL DAILY PRN
Status: DISCONTINUED | OUTPATIENT
Start: 2020-01-01 | End: 2020-01-01

## 2020-01-01 RX ORDER — LACTULOSE 10 G/15ML
20 SOLUTION ORAL 3 TIMES DAILY PRN
Status: DISCONTINUED | OUTPATIENT
Start: 2020-01-01 | End: 2020-01-01

## 2020-01-01 RX ORDER — 0.9 % SODIUM CHLORIDE 0.9 %
1000 INTRAVENOUS SOLUTION INTRAVENOUS ONCE
Status: COMPLETED | OUTPATIENT
Start: 2020-01-01 | End: 2020-01-01

## 2020-01-01 RX ORDER — OXYCODONE HYDROCHLORIDE 5 MG/1
5 TABLET ORAL EVERY 4 HOURS PRN
Status: DISCONTINUED | OUTPATIENT
Start: 2020-01-01 | End: 2021-01-01 | Stop reason: HOSPADM

## 2020-01-01 RX ORDER — ACETAMINOPHEN 325 MG/1
650 TABLET ORAL EVERY 6 HOURS PRN
Status: DISCONTINUED | OUTPATIENT
Start: 2020-01-01 | End: 2021-01-01 | Stop reason: HOSPADM

## 2020-01-01 RX ORDER — LACTULOSE 10 G/15ML
20 SOLUTION ORAL 3 TIMES DAILY
Status: DISCONTINUED | OUTPATIENT
Start: 2020-01-01 | End: 2021-01-01 | Stop reason: HOSPADM

## 2020-01-01 RX ORDER — LIDOCAINE HYDROCHLORIDE 10 MG/ML
5 INJECTION, SOLUTION INFILTRATION; PERINEURAL ONCE
Status: COMPLETED | OUTPATIENT
Start: 2020-01-01 | End: 2020-01-01

## 2020-01-01 RX ADMIN — ENOXAPARIN SODIUM 40 MG: 40 INJECTION SUBCUTANEOUS at 10:07

## 2020-01-01 RX ADMIN — BUPRENORPHINE HCL 2 MG: 2 TABLET SUBLINGUAL at 23:37

## 2020-01-01 RX ADMIN — VANCOMYCIN HYDROCHLORIDE 1000 MG: 1 INJECTION, POWDER, LYOPHILIZED, FOR SOLUTION INTRAVENOUS at 11:36

## 2020-01-01 RX ADMIN — OXYCODONE HYDROCHLORIDE 5 MG: 5 TABLET ORAL at 13:31

## 2020-01-01 RX ADMIN — OXYCODONE HYDROCHLORIDE 5 MG: 5 TABLET ORAL at 03:51

## 2020-01-01 RX ADMIN — PROCHLORPERAZINE EDISYLATE 10 MG: 5 INJECTION INTRAMUSCULAR; INTRAVENOUS at 09:19

## 2020-01-01 RX ADMIN — OXYCODONE HYDROCHLORIDE 5 MG: 5 TABLET ORAL at 21:43

## 2020-01-01 RX ADMIN — OXYCODONE HYDROCHLORIDE 5 MG: 5 TABLET ORAL at 22:04

## 2020-01-01 RX ADMIN — VANCOMYCIN HYDROCHLORIDE 1000 MG: 1 INJECTION, POWDER, LYOPHILIZED, FOR SOLUTION INTRAVENOUS at 11:47

## 2020-01-01 RX ADMIN — GADOTERIDOL 13 ML: 279.3 INJECTION, SOLUTION INTRAVENOUS at 12:03

## 2020-01-01 RX ADMIN — SODIUM CHLORIDE: 9 INJECTION, SOLUTION INTRAVENOUS at 17:55

## 2020-01-01 RX ADMIN — OXYCODONE HYDROCHLORIDE 5 MG: 5 TABLET ORAL at 20:22

## 2020-01-01 RX ADMIN — LIDOCAINE HYDROCHLORIDE 2 ML: 10 INJECTION, SOLUTION EPIDURAL; INFILTRATION; INTRACAUDAL; PERINEURAL at 17:35

## 2020-01-01 RX ADMIN — OXYCODONE HYDROCHLORIDE 5 MG: 5 TABLET ORAL at 19:45

## 2020-01-01 RX ADMIN — OXYCODONE HYDROCHLORIDE 5 MG: 5 TABLET ORAL at 03:53

## 2020-01-01 RX ADMIN — Medication 10 ML: at 08:47

## 2020-01-01 RX ADMIN — VANCOMYCIN HYDROCHLORIDE 1000 MG: 1 INJECTION, POWDER, LYOPHILIZED, FOR SOLUTION INTRAVENOUS at 22:11

## 2020-01-01 RX ADMIN — Medication 1 TABLET: at 08:43

## 2020-01-01 RX ADMIN — LACTULOSE 20 G: 20 SOLUTION ORAL at 15:00

## 2020-01-01 RX ADMIN — LACTULOSE 20 G: 20 SOLUTION ORAL at 08:34

## 2020-01-01 RX ADMIN — SODIUM CHLORIDE: 9 INJECTION, SOLUTION INTRAVENOUS at 05:03

## 2020-01-01 RX ADMIN — CEFTRIAXONE SODIUM 2 G: 2 INJECTION, POWDER, FOR SOLUTION INTRAMUSCULAR; INTRAVENOUS at 17:59

## 2020-01-01 RX ADMIN — VANCOMYCIN HYDROCHLORIDE 1000 MG: 1 INJECTION, POWDER, LYOPHILIZED, FOR SOLUTION INTRAVENOUS at 21:49

## 2020-01-01 RX ADMIN — CEFTRIAXONE SODIUM 1 G: 1 INJECTION, POWDER, FOR SOLUTION INTRAMUSCULAR; INTRAVENOUS at 09:12

## 2020-01-01 RX ADMIN — OXYCODONE HYDROCHLORIDE 5 MG: 5 TABLET ORAL at 23:53

## 2020-01-01 RX ADMIN — LACTULOSE 20 G: 20 SOLUTION ORAL at 09:31

## 2020-01-01 RX ADMIN — LACTULOSE 20 G: 20 SOLUTION ORAL at 10:07

## 2020-01-01 RX ADMIN — VANCOMYCIN HYDROCHLORIDE 750 MG: 750 INJECTION, POWDER, LYOPHILIZED, FOR SOLUTION INTRAVENOUS at 17:48

## 2020-01-01 RX ADMIN — OXYCODONE HYDROCHLORIDE 5 MG: 5 TABLET ORAL at 17:13

## 2020-01-01 RX ADMIN — Medication 10 ML: at 20:40

## 2020-01-01 RX ADMIN — OXYCODONE HYDROCHLORIDE 5 MG: 5 TABLET ORAL at 10:16

## 2020-01-01 RX ADMIN — Medication 1 TABLET: at 10:07

## 2020-01-01 RX ADMIN — CEFTRIAXONE SODIUM 2 G: 2 INJECTION, POWDER, FOR SOLUTION INTRAMUSCULAR; INTRAVENOUS at 17:54

## 2020-01-01 RX ADMIN — OXYCODONE HYDROCHLORIDE 5 MG: 5 TABLET ORAL at 12:57

## 2020-01-01 RX ADMIN — VANCOMYCIN HYDROCHLORIDE 750 MG: 750 INJECTION, POWDER, LYOPHILIZED, FOR SOLUTION INTRAVENOUS at 05:14

## 2020-01-01 RX ADMIN — OXYCODONE HYDROCHLORIDE 5 MG: 5 TABLET ORAL at 21:20

## 2020-01-01 RX ADMIN — Medication 1.5 G: at 23:46

## 2020-01-01 RX ADMIN — OXYCODONE HYDROCHLORIDE 5 MG: 5 TABLET ORAL at 10:07

## 2020-01-01 RX ADMIN — OXYCODONE HYDROCHLORIDE 5 MG: 5 TABLET ORAL at 08:46

## 2020-01-01 RX ADMIN — OXYCODONE HYDROCHLORIDE 5 MG: 5 TABLET ORAL at 20:02

## 2020-01-01 RX ADMIN — VANCOMYCIN HYDROCHLORIDE 1000 MG: 1 INJECTION, POWDER, LYOPHILIZED, FOR SOLUTION INTRAVENOUS at 23:52

## 2020-01-01 RX ADMIN — SODIUM CHLORIDE: 9 INJECTION, SOLUTION INTRAVENOUS at 04:31

## 2020-01-01 RX ADMIN — CEFTRIAXONE SODIUM 2 G: 2 INJECTION, POWDER, FOR SOLUTION INTRAMUSCULAR; INTRAVENOUS at 17:42

## 2020-01-01 RX ADMIN — VANCOMYCIN HYDROCHLORIDE 1250 MG: 10 INJECTION, POWDER, LYOPHILIZED, FOR SOLUTION INTRAVENOUS at 09:27

## 2020-01-01 RX ADMIN — LACTULOSE 20 G: 20 SOLUTION ORAL at 21:20

## 2020-01-01 RX ADMIN — Medication 1 TABLET: at 13:32

## 2020-01-01 RX ADMIN — LACTULOSE 20 G: 20 SOLUTION ORAL at 20:39

## 2020-01-01 RX ADMIN — ENOXAPARIN SODIUM 40 MG: 40 INJECTION SUBCUTANEOUS at 08:43

## 2020-01-01 RX ADMIN — Medication 10 ML: at 08:34

## 2020-01-01 RX ADMIN — LACTULOSE 20 G: 20 SOLUTION ORAL at 13:31

## 2020-01-01 RX ADMIN — OXYCODONE HYDROCHLORIDE 5 MG: 5 TABLET ORAL at 08:30

## 2020-01-01 RX ADMIN — VANCOMYCIN HYDROCHLORIDE 1000 MG: 1 INJECTION, POWDER, LYOPHILIZED, FOR SOLUTION INTRAVENOUS at 22:21

## 2020-01-01 RX ADMIN — TRAZODONE HYDROCHLORIDE 50 MG: 50 TABLET ORAL at 20:46

## 2020-01-01 RX ADMIN — Medication 10 ML: at 09:20

## 2020-01-01 RX ADMIN — Medication 10 ML: at 10:07

## 2020-01-01 RX ADMIN — ENOXAPARIN SODIUM 40 MG: 40 INJECTION SUBCUTANEOUS at 09:17

## 2020-01-01 RX ADMIN — OXYCODONE HYDROCHLORIDE 5 MG: 5 TABLET ORAL at 00:08

## 2020-01-01 RX ADMIN — LACTULOSE 20 G: 20 SOLUTION ORAL at 14:10

## 2020-01-01 RX ADMIN — Medication: at 17:57

## 2020-01-01 RX ADMIN — IOPAMIDOL 75 ML: 755 INJECTION, SOLUTION INTRAVENOUS at 16:41

## 2020-01-01 RX ADMIN — Medication 1 TABLET: at 08:34

## 2020-01-01 RX ADMIN — OXYCODONE HYDROCHLORIDE 5 MG: 5 TABLET ORAL at 22:23

## 2020-01-01 RX ADMIN — TRAZODONE HYDROCHLORIDE 50 MG: 50 TABLET ORAL at 20:02

## 2020-01-01 RX ADMIN — Medication 10 ML: at 09:33

## 2020-01-01 RX ADMIN — Medication 1 TABLET: at 09:31

## 2020-01-01 RX ADMIN — SODIUM CHLORIDE 1000 ML: 9 INJECTION, SOLUTION INTRAVENOUS at 15:05

## 2020-01-01 RX ADMIN — CEFTRIAXONE SODIUM 2 G: 2 INJECTION, POWDER, FOR SOLUTION INTRAMUSCULAR; INTRAVENOUS at 17:57

## 2020-01-01 RX ADMIN — Medication 10 ML: at 20:02

## 2020-01-01 RX ADMIN — OXYCODONE HYDROCHLORIDE 5 MG: 5 TABLET ORAL at 09:36

## 2020-01-01 RX ADMIN — LACTULOSE 20 G: 20 SOLUTION ORAL at 14:18

## 2020-01-01 RX ADMIN — LACTULOSE 20 G: 20 SOLUTION ORAL at 08:43

## 2020-01-01 RX ADMIN — ACETAMINOPHEN 650 MG: 325 TABLET ORAL at 03:18

## 2020-01-01 RX ADMIN — VANCOMYCIN HYDROCHLORIDE 1000 MG: 1 INJECTION, POWDER, LYOPHILIZED, FOR SOLUTION INTRAVENOUS at 00:38

## 2020-01-01 RX ADMIN — ENOXAPARIN SODIUM 40 MG: 40 INJECTION SUBCUTANEOUS at 09:31

## 2020-01-01 RX ADMIN — LACTULOSE 20 G: 20 SOLUTION ORAL at 20:22

## 2020-01-01 RX ADMIN — CEFTRIAXONE SODIUM 2 G: 2 INJECTION, POWDER, FOR SOLUTION INTRAMUSCULAR; INTRAVENOUS at 17:34

## 2020-01-01 RX ADMIN — SODIUM CHLORIDE: 9 INJECTION, SOLUTION INTRAVENOUS at 14:46

## 2020-01-01 RX ADMIN — VANCOMYCIN HYDROCHLORIDE 1250 MG: 10 INJECTION, POWDER, LYOPHILIZED, FOR SOLUTION INTRAVENOUS at 20:36

## 2020-01-01 RX ADMIN — SODIUM CHLORIDE: 9 INJECTION, SOLUTION INTRAVENOUS at 01:42

## 2020-01-01 RX ADMIN — VANCOMYCIN HYDROCHLORIDE 1000 MG: 1 INJECTION, POWDER, LYOPHILIZED, FOR SOLUTION INTRAVENOUS at 10:53

## 2020-01-01 RX ADMIN — VANCOMYCIN HYDROCHLORIDE 750 MG: 750 INJECTION, POWDER, LYOPHILIZED, FOR SOLUTION INTRAVENOUS at 18:30

## 2020-01-01 RX ADMIN — Medication 10 ML: at 23:50

## 2020-01-01 RX ADMIN — OXYCODONE HYDROCHLORIDE 5 MG: 5 TABLET ORAL at 04:08

## 2020-01-01 RX ADMIN — ENOXAPARIN SODIUM 40 MG: 40 INJECTION SUBCUTANEOUS at 08:34

## 2020-01-01 RX ADMIN — OXYCODONE HYDROCHLORIDE 5 MG: 5 TABLET ORAL at 04:30

## 2020-01-01 RX ADMIN — CEFTRIAXONE SODIUM 1 G: 1 INJECTION, POWDER, FOR SOLUTION INTRAMUSCULAR; INTRAVENOUS at 16:57

## 2020-01-01 RX ADMIN — SODIUM CHLORIDE: 9 INJECTION, SOLUTION INTRAVENOUS at 02:15

## 2020-01-01 RX ADMIN — CEFTRIAXONE SODIUM 2 G: 2 INJECTION, POWDER, FOR SOLUTION INTRAMUSCULAR; INTRAVENOUS at 17:10

## 2020-01-01 RX ADMIN — OXYCODONE HYDROCHLORIDE 5 MG: 5 TABLET ORAL at 09:12

## 2020-01-01 RX ADMIN — OXYCODONE HYDROCHLORIDE 5 MG: 5 TABLET ORAL at 17:14

## 2020-01-01 RX ADMIN — SODIUM CHLORIDE: 9 INJECTION, SOLUTION INTRAVENOUS at 13:43

## 2020-01-01 RX ADMIN — SODIUM CHLORIDE: 9 INJECTION, SOLUTION INTRAVENOUS at 02:08

## 2020-01-01 RX ADMIN — OXYCODONE HYDROCHLORIDE 5 MG: 5 TABLET ORAL at 15:31

## 2020-01-01 RX ADMIN — OXYCODONE HYDROCHLORIDE 5 MG: 5 TABLET ORAL at 20:38

## 2020-01-01 RX ADMIN — CLONIDINE HYDROCHLORIDE 0.1 MG: 0.1 TABLET ORAL at 23:37

## 2020-01-01 RX ADMIN — SODIUM CHLORIDE 1000 ML: 9 INJECTION, SOLUTION INTRAVENOUS at 21:43

## 2020-01-01 RX ADMIN — CEFTRIAXONE SODIUM 1 G: 1 INJECTION, POWDER, FOR SOLUTION INTRAMUSCULAR; INTRAVENOUS at 10:00

## 2020-01-01 RX ADMIN — VANCOMYCIN HYDROCHLORIDE 1250 MG: 10 INJECTION, POWDER, LYOPHILIZED, FOR SOLUTION INTRAVENOUS at 20:22

## 2020-01-01 RX ADMIN — OXYCODONE HYDROCHLORIDE 5 MG: 5 TABLET ORAL at 09:19

## 2020-01-01 ASSESSMENT — PAIN DESCRIPTION - ONSET: ONSET: ON-GOING

## 2020-01-01 ASSESSMENT — PAIN SCALES - GENERAL
PAINLEVEL_OUTOF10: 10
PAINLEVEL_OUTOF10: 8
PAINLEVEL_OUTOF10: 9
PAINLEVEL_OUTOF10: 5
PAINLEVEL_OUTOF10: 8
PAINLEVEL_OUTOF10: 10
PAINLEVEL_OUTOF10: 9
PAINLEVEL_OUTOF10: 8
PAINLEVEL_OUTOF10: 0
PAINLEVEL_OUTOF10: 0
PAINLEVEL_OUTOF10: 9
PAINLEVEL_OUTOF10: 7
PAINLEVEL_OUTOF10: 2
PAINLEVEL_OUTOF10: 6
PAINLEVEL_OUTOF10: 8
PAINLEVEL_OUTOF10: 8
PAINLEVEL_OUTOF10: 10
PAINLEVEL_OUTOF10: 8
PAINLEVEL_OUTOF10: 8
PAINLEVEL_OUTOF10: 10
PAINLEVEL_OUTOF10: 0
PAINLEVEL_OUTOF10: 8
PAINLEVEL_OUTOF10: 10

## 2020-01-01 ASSESSMENT — PAIN DESCRIPTION - PAIN TYPE
TYPE: ACUTE PAIN

## 2020-01-01 ASSESSMENT — PAIN - FUNCTIONAL ASSESSMENT: PAIN_FUNCTIONAL_ASSESSMENT: PREVENTS OR INTERFERES WITH MANY ACTIVE NOT PASSIVE ACTIVITIES

## 2020-01-01 ASSESSMENT — PAIN DESCRIPTION - FREQUENCY
FREQUENCY: CONTINUOUS
FREQUENCY: CONTINUOUS

## 2020-01-01 ASSESSMENT — ENCOUNTER SYMPTOMS: TACHYPNEA: 1

## 2020-01-01 ASSESSMENT — PAIN DESCRIPTION - LOCATION
LOCATION: BACK

## 2020-01-01 ASSESSMENT — PAIN DESCRIPTION - PROGRESSION: CLINICAL_PROGRESSION: NOT CHANGED

## 2020-01-01 ASSESSMENT — PAIN SCALES - WONG BAKER: WONGBAKER_NUMERICALRESPONSE: 8

## 2020-12-23 PROBLEM — I26.90 ACUTE SEPTIC PULMONARY EMBOLUS WITHOUT ACUTE COR PULMONALE (HCC): Status: ACTIVE | Noted: 2020-01-01

## 2020-12-23 NOTE — ED NOTES

## 2020-12-23 NOTE — ED PROVIDER NOTES
 Years of education: None    Highest education level: None   Occupational History    None   Social Needs    Financial resource strain: None    Food insecurity     Worry: None     Inability: None    Transportation needs     Medical: None     Non-medical: None   Tobacco Use    Smoking status: Current Every Day Smoker     Packs/day: 1.00     Types: Cigarettes    Smokeless tobacco: Never Used   Substance and Sexual Activity    Alcohol use: No    Drug use: Yes     Types: IV, Cocaine, Opiates , Marijuana     Comment: on subutex now.  Sexual activity: Yes     Partners: Female   Lifestyle    Physical activity     Days per week: None     Minutes per session: None    Stress: None   Relationships    Social connections     Talks on phone: None     Gets together: None     Attends [employer] service: None     Active member of club or organization: None     Attends meetings of clubs or organizations: None     Relationship status: None    Intimate partner violence     Fear of current or ex partner: None     Emotionally abused: None     Physically abused: None     Forced sexual activity: None   Other Topics Concern    None   Social History Narrative    None       SCREENINGS:            PHYSICAL EXAM:       ED Triage Vitals   BP Temp Temp Source Pulse Resp SpO2 Height Weight   12/23/20 1339 12/23/20 1339 12/23/20 1339 12/23/20 1245 12/23/20 1339 12/23/20 1245 12/23/20 1339 12/23/20 1339   87/62 97.6 °F (36.4 °C) Oral 128 16 92 % 5' 10\" (1.778 m) 145 lb (65.8 kg)       Physical Exam    CONSTITUTIONAL: Awake and alert. Cooperative. Well-developed. Well-nourished.    Vitals:    12/23/20 1245 12/23/20 1339 12/23/20 1454   BP:  87/62 97/61   Pulse: 128 135 104   Resp:  16 23   Temp:  97.6 °F (36.4 °C)    TempSrc:  Oral    SpO2: 92% 93% 95%   Weight:  145 lb (65.8 kg)    Height:  5' 10\" (1.778 m) HENT: Normocephalic. Atraumatic. External ears normal, without discharge. TMs clear bilaterally. No nasal discharge. Oropharynx clear, no erythema. Mucous membranes moist.  EYES: Conjunctiva non-injected, no lid abnormalities noted. No scleral icterus. PERRL. EOM's grossly intact. Anterior chambers clear. NECK: Supple. Normal ROM. No meningismus. No thyroid tenderness or swelling noted. CARDIOVASCULAR: Tachycardic. No Murmer. PULMONARY/CHEST WALL: Effort normal. No tachypnea. Lungs clear to ausculation. ABDOMEN: Normal BS. Soft. Nondistended. No tenderness to palpate. No guarding. No hernias noted. No splenomegaly. Back: Spine is midline. No ecchymosis. No crepitus on palpation. No obvious subluxation of vertebral column. No saddle anesthesia or evidence of cauda equina. There is diffuse back pain noted to palpation  /ANORECTAL: Not assessed  MUSKULOSKELETAL: Normal ROM. No acute deformities. No edema. No tenderness to palpate. SKIN: Warm and dry. NEUROLOGICAL:  GCS 15. CN II-XII grossly intact. Strength is 5/5 in allextremities and sensation is intact. PSYCHIATRIC: Normal affect, normal insight and judgement. Alert andoriented x 3.         DIAGNOSTIC RESULTS:     LABS:    Results for orders placed or performed during the hospital encounter of 12/23/20   CBC auto differential   Result Value Ref Range    WBC 24.9 (H) 4.0 - 11.0 K/uL    RBC 4.28 4.20 - 5.90 M/uL    Hemoglobin 12.8 (L) 13.5 - 17.5 g/dL    Hematocrit 37.6 (L) 40.5 - 52.5 %    MCV 87.8 80.0 - 100.0 fL    MCH 29.9 26.0 - 34.0 pg    MCHC 34.0 31.0 - 36.0 g/dL    RDW 16.1 (H) 12.4 - 15.4 %    Platelets 64 (L) 553 - 450 K/uL    MPV 9.7 5.0 - 10.5 fL    PLATELET SLIDE REVIEW Decreased     SLIDE REVIEW see below     Neutrophils % 92.5 %    Lymphocytes % 3.8 %    Monocytes % 3.4 %    Eosinophils % 0.1 %    Basophils % 0.2 %    Neutrophils Absolute 23.1 (H) 1.7 - 7.7 K/uL    Lymphocytes Absolute 0.9 (L) 1.0 - 5.1 K/uL Monocytes Absolute 0.9 0.0 - 1.3 K/uL    Eosinophils Absolute 0.0 0.0 - 0.6 K/uL    Basophils Absolute 0.0 0.0 - 0.2 K/uL   Comprehensive metabolic panel   Result Value Ref Range    Sodium 125 (L) 136 - 145 mmol/L    Potassium 4.4 3.5 - 5.1 mmol/L    Chloride 92 (L) 99 - 110 mmol/L    CO2 25 21 - 32 mmol/L    Anion Gap 8 3 - 16    Glucose 105 (H) 70 - 99 mg/dL    BUN 44 (H) 7 - 20 mg/dL    CREATININE 1.0 0.9 - 1.3 mg/dL    GFR Non-African American >60 >60    GFR African American >60 >60    Calcium 9.2 8.3 - 10.6 mg/dL    Total Protein 7.7 6.4 - 8.2 g/dL    Alb 2.4 (L) 3.4 - 5.0 g/dL    Albumin/Globulin Ratio 0.5 (L) 1.1 - 2.2    Total Bilirubin 4.7 (H) 0.0 - 1.0 mg/dL    Alkaline Phosphatase 150 (H) 40 - 129 U/L    ALT 39 10 - 40 U/L    AST 84 (H) 15 - 37 U/L    Globulin 5.3 g/dL   Lactic Acid, Plasma   Result Value Ref Range    Lactic Acid 2.1 (H) 0.4 - 2.0 mmol/L   CK   Result Value Ref Range    Total  39 - 308 U/L   COVID-19   Result Value Ref Range    SARS-CoV-2, NAAT Not Detected Not Detected   Troponin   Result Value Ref Range    Troponin 0.05 (H) <0.01 ng/mL   Lipase   Result Value Ref Range    Lipase 11.0 (L) 13.0 - 60.0 U/L   Ethanol   Result Value Ref Range    Ethanol Lvl None Detected mg/dL   Sedimentation Rate   Result Value Ref Range    Sed Rate 51 (H) 0 - 20 mm/Hr   EKG 12 Lead   Result Value Ref Range    Ventricular Rate 121 BPM    Atrial Rate 121 BPM    P-R Interval 134 ms    QRS Duration 86 ms    Q-T Interval 312 ms    QTc Calculation (Bazett) 443 ms    P Axis 66 degrees    R Axis 42 degrees    T Axis 30 degrees    Diagnosis       Sinus tachycardiaNonspecific ST abnormalityConfirmed by RAMONITA PRATHER, Harsh Negrete ([id number]) on 12/23/2020 2:07:20 PM         RADIOLOGY:  All x-ray studies are viewed/reviewedby me.   Formal interpretations per the radiologist are as follows:      CT CHEST PULMONARY EMBOLISM W CONTRAST   Final Result Findings most compatible with diffuse septic emboli. Superimposed bilateral   lower lobe airspace opacities. No pulmonary arterial filling defect. Multiple prominent mediastinal lymph nodes, likely reactive. CT ABDOMEN PELVIS W IV CONTRAST Additional Contrast? None   Final Result   No acute abnormality detected within the abdomen and pelvis. Moderate splenomegaly. Absent right kidney. CT LUMBAR SPINE TRAUMA RECONSTRUCTION   Final Result   Unremarkable non-contrast CT of the lumbar spine. CT HEAD WO CONTRAST   Final Result   No acute intracranial abnormality. Mild chronic appearing sphenoethmoid sinus mucosal disease. CT CERVICAL SPINE WO CONTRAST   Final Result   Multilevel degenerative changes with no acute abnormality of the cervical   spine. Irregular cavitary nodules at the bilateral lung apices. The CTA of the   chest has been scheduled. XR CHEST PORTABLE   Final Result   1. Patchy bibasilar airspace disease concerning for multifocal pneumonia   rather than atelectasis. 2. 1.5 cm rounded right upper lobe pulmonary nodule. Recommend CT of the   chest with contrast for further evaluation. EKG:  See EKG interpretation by an attending phsyician      PROCEDURES:   N/A    CRITICAL CARE TIME:   Total critical care time today provided was at least 34 minutes. This excludes seperately billable procedure. Critical care time provided for acute septic emboli that required close evaluation and/or intervention with concern for patient decompensation.      CONSULTS:  IP CONSULT TO HOSPITALIST      EMERGENCYDEPARTMENT COURSE and DIFFERENTIAL DIAGNOSIS/MDM:   Vitals:    Vitals:    12/23/20 1245 12/23/20 1339 12/23/20 1454   BP:  87/62 97/61   Pulse: 128 135 104   Resp:  16 23   Temp:  97.6 °F (36.4 °C)    TempSrc:  Oral    SpO2: 92% 93% 95%   Weight:  145 lb (65.8 kg)    Height:  5' 10\" (1.778 m) Patient was given the following medications:  Medications   azithromycin (ZITHROMAX) 500 mg in D5W 250ml addavial (has no administration in time range)   0.9 % sodium chloride bolus (1,000 mLs Intravenous New Bag 12/23/20 1505)   cefTRIAXone (ROCEPHIN) 1 g IVPB in 50 mL D5W minibag (1 g Intravenous New Bag 12/23/20 1657)   iopamidol (ISOVUE-370) 76 % injection 75 mL (75 mLs Intravenous Given 12/23/20 1641)         Patient was evaluated by both myself and Keith Carmona MD.    Patient presented to the emergency room today with complaints of generalized pain. Patient work-up today showed a leukocytosis to 25,000, lactate elevated at 2.1, patient was hyponatremic to 125. ESR elevated at 51. Patient had a CT of the chest which showed diffuse septic emboli. Patient was started on IV antibiotics, patient will need to be admitted to the hospital for further evaluation and treatment. Patient was evaluated by the attending physician who agrees to plan of care. Patient laboratory studies, radiographic imaging, andassessment were all discussed with the patient and/or patient family. There was shared decision-making between myself, the attending physician, as well as the patient and/or their surrogate and we are all in agreement with admission. There was an opportunity for questions and all questions were answered to the best of my ability and to the satisfaction of the patient and/or patient family. FINAL IMPRESSION:      1. Septic embolism ([address]          DISPOSITION/PLAN:   DISPOSITIONDecision To Admit      PATIENT REFERRED TO:  No follow-up provider specified.     DISCHARGE MEDICATIONS:  New Prescriptions    No medications on file                  (Please note that portions of this note were completed with a voice recognition program.  Efforts were made to edit the dictations, but occasionally words are mis-transcribed.)    VLADIMIR Vasquez CNP-C (electronically signed)        VLADIMIR Vasquez CNP 12/24/20 0049

## 2020-12-23 NOTE — ED NOTES
Attempted to get blood culture set on pt unsuccessfully. Pt notified that a urine sample is needed as well.       Darius Dent  12/23/20 6259

## 2020-12-23 NOTE — ED NOTES
Patient's visitor provided with copy of Emergency Department Visitor Restrictions. Instructed to review while waiting to visit with patient. Visitor verbalized understanding.      Michael Castaneda RN  12/23/20 6475

## 2020-12-23 NOTE — ED NOTES

## 2020-12-23 NOTE — ED NOTES
[id number] [facility] septic emboli  1802  called back     Cynthia Whitmore  12/23/20 1802

## 2020-12-23 NOTE — ED PROVIDER NOTES
Includes examination,  lab interpretation, charting, treating with IV antibiotics for septic emboli and concern for severe sepsis, due to initial concern for possible COVID based on x-ray, we did not give 30 mL/kg IV fluids but only 1 liter fluids to avoid fluid overload since if it was COVID, this could lead to respiratory failure   Excludes separate billable procedures. Patient at risk for serious decompensation if not treated for this life-threatening condition. MDM: Patient was evaluated due to having worsening diffuse body aches and pain throughout the body for the last 2 to 3 days with known history of IV drug abuse. X-ray mention possibility of multifocal pneumonia although due to recent drug use, CT was obtained showing septic emboli although no obvious findings near the lumbar spine to explain his low back pain. He was initially started on azithromycin and Rocephin based on x-ray but once there was concern for septic emboli, vancomycin was added on. Hospitalist is aware that we are concerned about the possibility of spinal infection although at this time no obvious findings for cauda equina syndrome. Hospitalist plans to follow-up on this and patient may ultimately need MRI of the lumbar spine as well based on the area of pain. Need further evaluation in the hospital with IV antibiotics. He is stable for the floor. Covid was negative. Urinalysis also showed infection although Rocephin should cover this.      Hua Joseph MD  12/24/20 2300

## 2020-12-24 NOTE — PROGRESS NOTES
COWS score is 9 at this time, patient continues to refuse clonidine and subutex per COWS protocol. Will reassess and reattempt at a later time.

## 2020-12-24 NOTE — CARE COORDINATION
CM met with patient at bedside to discuss potential discharge plan, as pt will likely be difficult placement d/t history and insurance. Pt states he is homeless and has no preference for skilled facility. Referrals made to: [facility] (CM spoke with Margaret Herrera in central admissions and face sheet faxed)    [facility] (CM spoke with Marek Goodrich in admissions and faxed face sheet)    Disposition likely not until at least 12/29 per provider note. CM team will continue to follow. [facility] on chart.      Rocky Bhatt RN

## 2020-12-24 NOTE — PROGRESS NOTES
Hospitalist Progress Note      PCP: No primary care provider on file. Date of Admission: 12/23/2020    Hospital Course:     HPI on admission: \"The patient is a pleasant 60 Y M with a h/o heroin IVDA, HCV, and HBV. He presents with about a week or worsening pain all over his body. The worse pain is a sharp, severe, stabbing sensation throughout his thorax when he breathes deeply. He has had subjective chills. WBC was 24 in the ED and CT showed diffuse septic pulmonary emboli. Pain is constant, better with opioids. \"    Subjective: Pt. Is anxious. He has been refusing clonidine and subutex. He reports pain at his ribs. He denies any other complaints. Medications:  Reviewed    Infusion Medications    sodium chloride 100 mL/hr at 12/24/20 0215     Scheduled Medications    influenza virus vaccine  0.5 mL Intramuscular Prior to discharge    sodium chloride flush  10 mL Intravenous 2 times per day    enoxaparin  40 mg Subcutaneous Daily    cefTRIAXone (ROCEPHIN) IV  1 g Intravenous Q24H    vancomycin  1,000 mg Intravenous Q12H    nicotine  1 patch Transdermal Daily     PRN Meds: perflutren lipid microspheres, magnesium sulfate, potassium chloride **OR** potassium alternative oral replacement **OR** potassium chloride, oxyCODONE, sodium chloride flush, polyethylene glycol, acetaminophen **OR** acetaminophen, prochlorperazine, diphenhydrAMINE, buprenorphine **AND** cloNIDine, traZODone      Intake/Output Summary (Last 24 hours) at 12/24/2020 1327  Last data filed at 12/24/2020 1126  Gross per 24 hour   Intake 50 ml   Output 1100 ml   Net -1050 ml       Physical Exam Performed:    /70   Pulse 91   Temp 98.1 °F (36.7 °C) (Oral)   Resp 20   Ht 5' 10\" (1.778 m)   Wt 145 lb (65.8 kg)   SpO2 95%   BMI 20.81 kg/m²     General appearance: No apparent distress, appears stated age and cooperative. HEENT: Pupils equal, round, and reactive to light. Conjunctivae/corneas clear. Neck: Supple, with full range of motion. No jugular venous distention. Trachea midline. Respiratory:  Normal respiratory effort. Clear to auscultation, bilaterally without Rales/Wheezes/Rhonchi. Cardiovascular: Regular rate and rhythm with normal S1/S2 without murmurs, rubs or gallops. Abdomen: Soft, non-tender, non-distended with normal bowel sounds. Musculoskeletal: no edema. Labs:   Recent Labs     12/23/20  1405 12/24/20  1006   WBC 24.9* 22.2*   HGB 12.8* 11.7*   HCT 37.6* 34.2*   PLT 64* 46*     Recent Labs     12/23/20  1405 12/24/20  1006   * 131*   K 4.4 4.2   CL 92* 101   CO2 25 22   BUN 44* 37*   CREATININE 1.0 0.8*   CALCIUM 9.2 8.6     Recent Labs     12/23/20  1405 12/24/20  1006   AST 84* 105*   ALT 39 40   BILIDIR  --  4.3*   BILITOT 4.7* 5.4*   ALKPHOS 150* 125     No results for input(s): INR in the last 72 hours. Recent Labs     12/23/20  1405 12/23/20  2158   CKTOTAL 125  --    TROPONINI 0.05* 0.04*       Urinalysis:      Lab Results   Component Value Date    NITRU POSITIVE 12/23/2020    WBCUA 21-50 12/23/2020    BACTERIA 3+ 12/23/2020    RBCUA 3-4 12/23/2020    BLOODU SMALL 12/23/2020    SPECGRAV 1.015 12/23/2020    GLUCOSEU Negative 12/23/2020       Radiology:  CT CHEST PULMONARY EMBOLISM W CONTRAST   Final Result   Findings most compatible with diffuse septic emboli. Superimposed bilateral   lower lobe airspace opacities. No pulmonary arterial filling defect. Multiple prominent mediastinal lymph nodes, likely reactive. CT ABDOMEN PELVIS W IV CONTRAST Additional Contrast? None   Final Result   No acute abnormality detected within the abdomen and pelvis. Moderate splenomegaly. Absent right kidney. CT LUMBAR SPINE TRAUMA RECONSTRUCTION   Final Result   Unremarkable non-contrast CT of the lumbar spine. CT HEAD WO CONTRAST   Final Result   No acute intracranial abnormality. Mild chronic appearing sphenoethmoid sinus mucosal disease. CT CERVICAL SPINE WO CONTRAST   Final Result   Multilevel degenerative changes with no acute abnormality of the cervical   spine. Irregular cavitary nodules at the bilateral lung apices. The CTA of the   chest has been scheduled. XR CHEST PORTABLE   Final Result   1. Patchy bibasilar airspace disease concerning for multifocal pneumonia   rather than atelectasis. 2. 1.5 cm rounded right upper lobe pulmonary nodule. Recommend CT of the   chest with contrast for further evaluation. 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 Vancomycin started 12/23.      Acute cystitis  - potentially bacteremic spread. Ceftriaxone for now to cover gram negatives. F/u urine culture.      Sepsis, present on arrival, due to above  - IVFs, trend lacate  - hyperbilirubinemia and thrombocytopenia are thought to be manifestations of the sepsis. Monitor.      Back pain  - seems more pleuritic than spinal.  He is at risk for discitis or epidural abscess. If symptoms continue, consider MRI with and without contrast.  He does not have any symptoms of cord compression, and he will probably already be committed to 6 weeks of IV abx.     Hypovolemic hyponatremia  - will presumably improve with IVFs.    Demand ischemia  -Trended troponins, downtrending. DVT Prophylaxis: lovenox. Diet: DIET GENERAL;  Code Status: Full Code    PT/OT Eval Status: ordered. Dispo - 1-2 days, pending further hospital course/acceptance to SNF.     Ash Cardenas MD

## 2020-12-24 NOTE — PROGRESS NOTES
4 Eyes Skin Assessment     NAME:  Darryl Kim  YOB: 1965  MEDICAL RECORD NUMBER:  [id number]    The patient is being assess for  Admission    I agree that 2 RN's have performed a thorough Head to Toe Skin Assessment on the patient. ALL assessment sites listed below have been assessed. Areas assessed by both nurses:    Head, Face, Ears, Shoulders, Back, Chest, Arms, Elbows, Hands, Sacrum. Buttock, Coccyx, Ischium and Legs. Feet and Heels        Does the Patient have a Wound?  No noted wound(s)       Jose Prevention initiated:  NA   Wound Care Orders initiated:  NA    Pressure Injury (Stage 3,4, Unstageable, DTI, NWPT, and Complex wounds) if present place consult order under [de-identified] NA    New and Established Ostomies if present place consult order under : NA      Nurse 1 eSignature: Electronically signed by Zully Douglas RN on 12/24/20 at 2:00 AM EST    **SHARE this note so that the co-signing nurse is able to place an eSignature**    Nurse 2 eSignature: Electronically signed by Malick Hartman RN on 12/24/20 at 2:05 AM EST

## 2020-12-24 NOTE — CARE COORDINATION
Received a call from [facility] at [facility] on VM from Angy Lowe regarding referral. Cortney's number is [phone]. She states they do not take patient on subutex however since patient has been refusing it they will accept if subutex is discontinued. Referral to [facility] still pending.

## 2020-12-24 NOTE — CARE COORDINATION
Chart reviewed by . Triggered by diagnosis. Pt on B3 at this time. Being followed by internal medicine for septic emboli - likely right sided endocarditis. Pt inpatient for the foreseeable future for IV antibiotics. Per provider note, pt will likely need course of IV antibiotics in skilled nursing facility at discharge. CM will call patient to complete initial assessment. Patient room phone ([phone]) called. No answer. CM will attempt to see patient at bedside this morning to obtain information and inquire about potential SNF placement for IV antibiotics.

## 2020-12-24 NOTE — CONSULTS
Pharmacy Note  Vancomycin Consult    Jacky Longoria is a 60 y.o. male started on Vancomycin for Endocarditis; consult received from Dr. Stacia Fuentes to manage therapy. Also receiving the following antibiotics: Ceftriaxone. Allergies:  Codeine     Tmax: 98.8     Recent Labs     12/23/20  1405   CREATININE 1.0       Recent Labs     12/23/20  1405   WBC 24.9*       Estimated Creatinine Clearance: 76 mL/min (based on SCr of 1 mg/dL). Intake/Output Summary (Last 24 hours) at 12/23/2020 2151  Last data filed at 12/23/2020 1753  Gross per 24 hour   Intake 50 ml   Output    Net 50 ml       Wt Readings from Last 1 Encounters:   12/23/20 145 lb (65.8 kg)         Body mass index is 20.81 kg/m². Culture Date      Source                       Results  12/23                  Blood     Loading dose (critically ill or in ICU, require dialysis or renal replacement therapy): Vancomycin 25 mg/kg IVPB x 1 (maximum 3000 mg). Maintenance dose: 15 mg/kg (maximum: 2000 mg/dose and 4500 mg/day) starting at the next dosing interval determined by renal function  Pulse dose: fluctuating renal function, ABIOLA, ESRD   Goal Vancomycin trough: 10-15 mcg/mL or 15-20 mcg/mL   Goal Vancomycin AUC: 400-600     Assessment/Plan:  Will initiate Vancomycin with a one time loading dose of 1500 mg x1, followed by 1000 mg IV every 12 hours. Calculated . Vancomycin trough ordered for 12/25 0930. Timing of trough level will be determined based on culture results, renal function, and clinical response. Thank you for the consult.    Bowen Solitario, PharmD 12/23/2020 9:53 PM

## 2020-12-24 NOTE — PROGRESS NOTES
Writer scored patient based off of the COWS scale for opioid withdraw, patient scored an 8. Nurse attempted to administer Subutex and Clonidine per COWS protocol but patient refused. Will reassess at a later time and attempt administration if needed.

## 2020-12-24 NOTE — DISCHARGE INSTR - COC
Continuity of Care Form    Patient Name: Jacky Longoria   :  1965  MRN:  [id number]    Admit date:  2020  Discharge date:  2020    Code Status Order: Full Code   Advance Directives:   [facility] Documentation       Date/Time Healthcare Directive Type of Healthcare Directive Copy in [address] [facility] [address] Agent's Name Healthcare Agent's Phone Number    [phone]  No, patient does not have an advance directive for healthcare treatment -- -- -- -- --            Admitting Physician:  Astrid Frias MD  PCP: No primary care provider on file. Discharging Nurse: [facility] Unit/Room#: 8459/2752-31  Discharging Unit Phone Number: [phone]    Emergency Contact:   Extended Emergency Contact Information  Primary Emergency Contact: Carolann Avina [address] Phone: [phone]  Mobile Phone: [phone]  Relation: Child  Secondary Emergency Contact: Viry Mendes  Mobile Phone: [phone]  Relation: None    Past Surgical History:  Past Surgical History:   Procedure Laterality Date    KIDNEY REMOVAL Right     WRIST SURGERY         Immunization History: There is no immunization history for the selected administration types on file for this patient.     Active Problems:  Patient Active Problem List   Diagnosis Code    Gastroenteritis K52.9    Nausea & vomiting R11.2    Leukocytosis D72.829    Marijuana smoker F12.90    Acute septic pulmonary embolus without acute cor pulmonale (HCC) I26.90       Isolation/Infection:   Isolation            No Isolation          Patient Infection Status       Infection Onset Added Last Indicated Last Indicated By Review Planned Expiration Resolved Resolved By    None active    Resolved    COVID-19 Rule Out 20 COVID-19 (Ordered)   20 Rule-Out Test Resulted            Nurse Assessment: Last Vital Signs: /70   Pulse 91   Temp 98.1 °F (36.7 °C) (Oral)   Resp 20   Ht 5' 10\" (1.778 m)   Wt 145 lb (65.8 kg)   SpO2 95%   BMI 20.81 kg/m²     Last documented pain score (0-10 scale): Pain Level: 10  Last Weight:   Wt Readings from Last 1 Encounters:   12/23/20 145 lb (65.8 kg)     Mental Status:  oriented and alert    IV Access:  - PICC - site  R Antecubital, insertion date: 12/26/2020    Nursing Mobility/ADLs:  Walking   Assisted  Transfer  Assisted  Bathing  Assisted  Dressing  Assisted  Toileting  Assisted  Feeding  Assisted  Med Admin  Assisted  Med Delivery   none    Wound Care Documentation and Therapy:        Elimination:  Continence:   · Bowel: Yes  · Bladder: Yes  Urinary Catheter: None   Colostomy/Ileostomy/Ileal Conduit: No       Date of Last BM: 1/1/2021    Intake/Output Summary (Last 24 hours) at 12/24/2020 1156  Last data filed at 12/24/2020 1126  Gross per 24 hour   Intake 50 ml   Output 1100 ml   Net -1050 ml     I/O last 3 completed shifts: In: 48 [IV Piggyback:50]  Out: 700 [Urine:700]    Safety Concerns: At Risk for Falls    Impairments/Disabilities:      None    Nutrition Therapy:  Current Nutrition Therapy:   - Oral Diet:  General    Routes of Feeding: Oral  Liquids: Thin Liquids  Daily Fluid Restriction: no  Last Modified Barium Swallow with Video (Video Swallowing Test): not done    Treatments at the Time of Hospital Discharge:   Respiratory Treatments:   Oxygen Therapy:  is not on home oxygen therapy. Ventilator:    - No ventilator support    Rehab Therapies: Physical Therapy and Occupational Therapy  Weight Bearing Status/Restrictions: No weight bearing restirctions  Other Medical Equipment (for information only, NOT a DME order):     Other Treatments:     Patient's personal belongings (please select all that are sent with patient):  None    RN SIGNATURE:  Electronically signed by Rae Smith RN on 1/1/21 at 12:40 PM EST    CASE MANAGEMENT/SOCIAL WORK SECTION Inpatient Status Date: 12/23/20    Readmission Risk Assessment Score:  Readmission Risk              Risk of Unplanned Readmission:        17           Discharging to Facility/ Agency   · Name:   · Address:  · Phone:  · Fax:    / signature: {[id number]}    PHYSICIAN SECTION    Prognosis: Good    Condition at Discharge: Stable    Rehab Potential (if transferring to Rehab): Good    Recommended Labs or Other Treatments After Discharge:   INFUSION ORDERS:  Vancomycin 1 gm iv q 12 through 2/11/21  - First dose given in hospital  - PICC   - Disposition / date discharge  - Check CBC w diff, CMP, ESR, CRP, vancomycin trough every Mon or Tue - FAX result to [phone]  - Call with antibiotic / infusion issues, [phone]  - Call with any change in status, transfer out of facility or to hospital - [phone]  - No f/u in outpatient ID office necessary    Physician Certification: I certify the above information and transfer of Charmaine Ayala  is necessary for the continuing treatment of the diagnosis listed and that he requires [facility] for less 30 days.      Update Admission H&P: No change in H&P    PHYSICIAN SIGNATURE:  Electronically signed by Ana Jennings MD on 1/1/21 at 11:17 AM EST

## 2020-12-24 NOTE — H&P
/83   Pulse 101   Temp 98.8 °F (37.1 °C) (Axillary)   Resp 20   Ht 5' 10\" (1.778 m)   Wt 145 lb (65.8 kg)   SpO2 96%   BMI 20.81 kg/m²       General appearance:  No apparent distress, appears stated age and cooperative. HEENT:  Normal cephalic, atraumatic without obvious deformity. Pupils equal, round, and reactive to light. Extra ocular muscles intact. Conjunctivae/corneas clear. Neck: Supple, with full range of motion. No jugular venous distention. Trachea midline. Respiratory:  Normal respiratory effort. Clear to auscultation, bilaterally without Rales/Wheezes/Rhonchi. He cannot bear to breathe deeply. Cardiovascular:  Tachycardic rate and rhythm with normal S1/S2 without murmurs, rubs or gallops. Abdomen: Soft, non-tender, non-distended with normal bowel sounds. Musculoskeletal:  No clubbing, cyanosis or edema bilaterally. Full range of motion without deformity. Skin: Skin color, texture, turgor normal.  No rashes or lesions. Neurologic:  Neurovascularly intact without any focal sensory/motor deficits. Cranial nerves: II-XII intact, grossly non-focal.  Psychiatric:  Alert and oriented, thought content appropriate, normal insight  Capillary Refill: Brisk,< 3 seconds   Peripheral Pulses: +2 palpable, equal bilaterally       Labs:     Recent Labs     12/23/20  1405   WBC 24.9*   HGB 12.8*   HCT 37.6*   PLT 64*     Recent Labs     12/23/20  1405   *   K 4.4   CL 92*   CO2 25   BUN 44*   CREATININE 1.0   CALCIUM 9.2     Recent Labs     12/23/20  1405   AST 84*   ALT 39   BILITOT 4.7*   ALKPHOS 150*     No results for input(s): INR in the last 72 hours.   Recent Labs     12/23/20  1405   CKTOTAL 125   TROPONINI 0.05*       Urinalysis:      Lab Results   Component Value Date    NITRU POSITIVE 12/23/2020    WBCUA 21-50 12/23/2020    BACTERIA 3+ 12/23/2020    RBCUA 3-4 12/23/2020    BLOODU SMALL 12/23/2020    SPECGRAV 1.015 12/23/2020    GLUCOSEU Negative 12/23/2020       Radiology: CXR: I have reviewed the CXR with the following interpretation: multifocal airspace disease  EKG:  I have reviewed the EKG with the following interpretation: nonspecific abnormalities    CT CHEST PULMONARY EMBOLISM W CONTRAST   Final Result   Findings most compatible with diffuse septic emboli. Superimposed bilateral   lower lobe airspace opacities. No pulmonary arterial filling defect. Multiple prominent mediastinal lymph nodes, likely reactive. CT ABDOMEN PELVIS W IV CONTRAST Additional Contrast? None   Final Result   No acute abnormality detected within the abdomen and pelvis. Moderate splenomegaly. Absent right kidney. CT LUMBAR SPINE TRAUMA RECONSTRUCTION   Final Result   Unremarkable non-contrast CT of the lumbar spine. CT HEAD WO CONTRAST   Final Result   No acute intracranial abnormality. Mild chronic appearing sphenoethmoid sinus mucosal disease. CT CERVICAL SPINE WO CONTRAST   Final Result   Multilevel degenerative changes with no acute abnormality of the cervical   spine. Irregular cavitary nodules at the bilateral lung apices. The CTA of the   chest has been scheduled. XR CHEST PORTABLE   Final Result   1. Patchy bibasilar airspace disease concerning for multifocal pneumonia   rather than atelectasis. 2. 1.5 cm rounded right upper lobe pulmonary nodule. Recommend CT of the   chest with contrast for further evaluation. 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 Vancomycin started 12/23. Acute cystitis  - potentially bacteremic spread. Ceftriaxone for now to cover gram negatives. F/u urine culture.      Sepsis, present on arrival, due to above  - IVFs, trend lacate - hyperbilirubinemia and thrombocytopenia are thought to be manifestations of the sepsis. Monitor. Back pain  - seems more pleuritic than spinal.  He is at risk for discitis or epidural abscess. If symptoms continue, consider MRI with and without contrast.  He does not have any symptoms of cord compression, and he will probably already be committed to 6 weeks of IV abx. Hypovolemic hyponatremia  - will presumably improve with IVFs. Trend troponin. Demand ischemia. DVT Prophylaxis: enoxaparin  Diet: DIET GENERAL;  Code Status: Full Code    PT/OT Eval Status: not indicated    Dispo - perhaps 12/29 if infection stable for outpatient management and abx regimen is determined. He will presumably need SNF for IV abx in monitored setting. Lon Oates MD    Thank you No primary care provider on file. for the opportunity to be involved in this patient's care. If you have any questions or concerns please feel free to contact me at [phone]. Sepsis Focus Note     Suspected source:  endocarditis  Blood cultures collected before antibiotics:  Yes  Empiric Antibiotics given:  Yes  Fluid resuscitation (30 ml/kg) given:  Yes  Initial Lactate:   Repeat Lactate:      Initial fluid resuscitation has been given. The following was performed to re-assess volume status and tissue perfusion. Focused Sepsis Exam Performed:  /83   Pulse 101   Temp 98.8 °F (37.1 °C) (Axillary)   Resp 20   Ht 5' 10\" (1.778 m)   Wt 145 lb (65.8 kg)   SpO2 96%   BMI 20.81 kg/m²   Cardiovascular exam shows regular rate and rhythm with normal S1/S2 without murmurs, rubs or gallops. Pulmonary exam shows a normal respiratory effort, clear to auscultation, bilaterally without Rales/Wheezes/Rhonchi. Skin exam shows normal color/perfusion. Extremity exam shows brisk capillary refill. Peripheral pulses were 2+ in the lower extremities.       If Central Line present  CVP:    ScvO2: CVP post fluid Challenge:

## 2020-12-25 NOTE — PROGRESS NOTES
Pt has refused clonidine and subutex throughout shift for COWS assessment. Pt was offered several times.

## 2020-12-25 NOTE — CONSULTS
12/25/20  Vanc trough = 12.2  Ok to continue Vancomycin 1000 mg IVPB q12h  Vanc trough ordered for 0930 on 12/26/20  Anupama Alves 12/25/2020 11:36 AM       12/28  SCr: 1.1  Vanc level = 25.2mcg  Currently on 1,250mg q12h   Will hold vanc for now, level at 21:00 rather than  12/29am due to MRSA bacteremia , further dosing strategy based on level.   - pt.has  one kidney .    Kassandra Alston/[facility]. 12/28/20 9:36 AM EST      12/28  Vanc level 15.2@ 2005  Last dose was given 12/27 2030  Give Vancomycin 1 gram q 12hours @ 2200  Predicted trough 18.8   Level ordered for 12/30 @ 0930  Monitor renal - may need to draw level sooner  Michele Mejia [facility] 12/28/2020 9:57 PM

## 2020-12-25 NOTE — PROGRESS NOTES
Occupational Therapy  Failed Attempt: pt lethargic, labored breathing and c/o pain. Pt unable to participate in therapy at this time. RN reports pt going through withdrawal but refusing medication. Will hold OT at this time and follow up another date as pt is appropriate.     Reji Rueda, OTR/L [id number]

## 2020-12-25 NOTE — PROGRESS NOTES
Attempted to call ID consult, no MD on call yet today. Will try again later.  Dr. Deangelo Mar added to tx team.

## 2020-12-25 NOTE — CONSULTS
Infectious Diseases   Consult Note      Reason for Consult:  IVDU, septic pulmonary emboli   Requesting Physician:  Jasmyne Stubbs MD       Date of Admission: 12/23/2020  Subjective:   CHIEF COMPLAINT:  None given       HPI:    Faheem Willard is a 61yoM with history of IVDU, HBV/HCV infection. ED 12/23/20 - cc generalized pain for several days. Patient is lethargic and add little to the history, reporting, \"all my joints hurt\" as well as back pain. Intermittent low grade fever since admission. Initial WBC was 24.9, ESR 51,   CT CAP - septic pulmonary emboli, SM, absent R kidney. Echo with TV vegetation, mild TR. Current abx:  Rocephin 1g q24  vanc 1g q12       Past Surgical History:       Diagnosis Date    Back pain     Hepatitis B surface antigen positive 08/31/2017    Hepatitis C antibody positive in blood 08/31/2017         Procedure Laterality Date    KIDNEY REMOVAL Right     WRIST SURGERY         Social History:    TOBACCO:   reports that he has been smoking cigarettes. He has been smoking about 1.00 pack per day. He has never used smokeless tobacco.  ETOH:   reports no history of alcohol use. Family History:   History reviewed. No pertinent family history. There is no family history of autoimmune diseases or significant infectious diseases.       Current Medications:    Current Facility-Administered Medications: influenza quadrivalent split vaccine (FLUZONE;FLUARIX;FLULAVAL;AFLURIA) injection 0.5 mL, 0.5 mL, Intramuscular, Prior to discharge  perflutren lipid microspheres (DEFINITY) injection 1.65 mg, 1.5 mL, Intravenous, ONCE PRN  magnesium sulfate 1 g in dextrose 5% 100 mL IVPB, 1 g, Intravenous, PRN potassium chloride (KLOR-CON M) extended release tablet 40 mEq, 40 mEq, Oral, PRN **OR** potassium bicarb-citric acid (EFFER-K) effervescent tablet 40 mEq, 40 mEq, Oral, PRN **OR** potassium chloride 10 mEq/100 mL IVPB (Peripheral Line), 10 mEq, Intravenous, PRN  oxyCODONE (ROXICODONE) immediate release tablet 5 mg, 5 mg, Oral, Q4H PRN  0.9 % sodium chloride infusion, , Intravenous, Continuous  sodium chloride flush 0.9 % injection 10 mL, 10 mL, Intravenous, 2 times per day  sodium chloride flush 0.9 % injection 10 mL, 10 mL, Intravenous, PRN  enoxaparin (LOVENOX) injection 40 mg, 40 mg, Subcutaneous, Daily  polyethylene glycol (GLYCOLAX) packet 17 g, 17 g, Oral, Daily PRN  acetaminophen (TYLENOL) tablet 650 mg, 650 mg, Oral, Q6H PRN **OR** acetaminophen (TYLENOL) suppository 650 mg, 650 mg, Rectal, Q6H PRN  prochlorperazine (COMPAZINE) injection 10 mg, 10 mg, Intravenous, Q6H PRN  diphenhydrAMINE (BENADRYL) tablet 50 mg, 50 mg, Oral, Q6H PRN  cefTRIAXone (ROCEPHIN) 1 g IVPB in 50 mL D5W minibag, 1 g, Intravenous, Q24H  vancomycin 1000 mg IVPB in 250 mL D5W addavial, 1,000 mg, Intravenous, Q12H  buprenorphine (SUBUTEX) SL tablet 2 mg, 2 mg, Sublingual, PRN **AND** cloNIDine (CATAPRES) tablet 0.1 mg, 0.1 mg, Oral, PRN  traZODone (DESYREL) tablet 50 mg, 50 mg, Oral, Nightly PRN  nicotine (NICODERM CQ) 21 MG/24HR 1 patch, 1 patch, Transdermal, Daily      Allergies   Allergen Reactions    Codeine         REVIEW OF SYSTEMS:    Patient is unable to contribute to the history     Objective:   PHYSICAL EXAM:      VITALS:  BP (!) 141/75   Pulse 85   Temp 98.3 °F (36.8 °C) (Oral)   Resp 16   Ht 5' 10\" (1.778 m)   Wt 145 lb (65.8 kg)   SpO2 91%   BMI 20.81 kg/m²      24HR INTAKE/OUTPUT:      Intake/Output Summary (Last 24 hours) at 12/25/2020 1331  Last data filed at 12/25/2020 1148  Gross per 24 hour   Intake 300 ml   Output 1425 ml   Net -1125 ml     CONSTITUTIONAL:   This. Lethargic, rouses to voice. HEENT: NCAT, PERRL, EOMI. Sclera white, conjunctive full. OP with moist mucosal membranes, no thrush, tongue protrudes midline  NECK:  Supple, symmetrical, trachea midline, no adenopathy  LUNGS:  no increased work of breathing, tachypneic. Lungs are clear kiarra   CARDIOVASCULAR:  RRR  ABDOMEN:  normal bowel sounds, slightly distended, generally tender without R/G  PSYCHIATRIC: Oriented to person place and time. No obvious depression or anxiety. MUSCULOSKELETAL: No obvious misalignment or effusion of the joints. No clubbing, cyanosis of the digits. Tenderness L spine   SKIN:  normal skin color, texture, turgor and no redness, warmth, or swelling. No palpable nodules or stigmata of embolic phenomenon  Track marks   NEUROLOGIC: nonfocal exam  ACCESS:   IV site ok     DATA:    Old records have been reviewed    CBC:  Recent Labs     12/23/20  1405 12/24/20  1006   WBC 24.9* 22.2*   RBC 4.28 3.90*   HGB 12.8* 11.7*   HCT 37.6* 34.2*   PLT 64* 46*   MCV 87.8 87.7   MCH 29.9 30.1   MCHC 34.0 34.3   RDW 16.1* 16.0*      BMP:  Recent Labs     12/23/20  1405 12/24/20  1006   * 131*   K 4.4 4.2   CL 92* 101   CO2 25 22   BUN 44* 37*   CREATININE 1.0 0.8*   CALCIUM 9.2 8.6   GLUCOSE 105* 84        Cultures:   12/23 COVID NAAT neg    UA 21-50wbc  12/24 UC NGTD       Radiology Review:  All pertinent images / reports were reviewed as a part of this visit. TTE 12/24/20   Summary   Technically difficult examination due to patient immobility. Normal LV systolic function with a visually estimated EF of 55%. Endocardium not entirely well visualized but no obvious segmental wall   motion abnormalities. Normal left ventricular diastolic filling pressure. There is a large mobile vegetation attached to the tricuspid valve,   primarily on the atrial portion but appearing to swing into the right   ventricle. It measures approximately 1.3 by 1.1 cm. Mild tricuspid regurgitation. Systolic pulmonary artery pressure (SPAP) is normal and estimated at 27 mmHg   (right atrial pressure 3 mmHg). CT CAP   Impression   Findings most compatible with diffuse septic emboli.  Superimposed bilateral   lower lobe airspace opacities.       No pulmonary arterial filling defect.       Multiple prominent mediastinal lymph nodes, likely reactive. Impression   No acute abnormality detected within the abdomen and pelvis.       Moderate splenomegaly.       Absent right kidney.         Assessment:     Patient Active Problem List   Diagnosis    Gastroenteritis    Nausea & vomiting    Leukocytosis    Marijuana smoker    Acute septic pulmonary embolus without acute cor pulmonale ([address]       Shasta Garcia is a 61yoM who is evaluated for the following:    IVDU   Last use ~12/22/20     Admitted 12/23/20 with TVE with septic pulmonary emboli, SM  Back pain and lumbar tenderness with elevation of ESR/CRP concerning for osteodiscitis  Microbiologic data forthcoming, RN spoke with lab to ensure [facility] are in process. S aureus is the most likely pathogen but other GP/GN/yeast also possible     HCV ab positive with +HBs ag 8/2017    Allergy codeine    Recs:  Continue broad empiric abx pending cultures. Pharmacy dosing the vanc. He only has unilateral kidney, watch renal function closely   Continue rocephin  BC and UC are pending   Get MRI L spine   Update HIV and viral hepatitis studies     Case d/w RN   We will follow      Raylene Ormond, M.D. Thank you for the opportunity to participate in the care of your patient.     Please do not hesitate to contact me:   [phone] office  [phone] mobile

## 2020-12-25 NOTE — PROGRESS NOTES
Physical Therapy: order received, chart reviewed. RN cleared pt for therapy. Upon approach pt sleeping heavily. Required loud vocalization and sternal rub to awaken briefly, fluttering eyes, but unable to stay awake long enough to give name/participate. Will reattempt if schedule allows; reschedule for tomorrow if not.  Anastasia Kerr PT

## 2020-12-25 NOTE — PROGRESS NOTES
CT ABDOMEN PELVIS W IV CONTRAST Additional Contrast? None   Final Result   No acute abnormality detected within the abdomen and pelvis. Moderate splenomegaly. Absent right kidney. CT LUMBAR SPINE TRAUMA RECONSTRUCTION   Final Result   Unremarkable non-contrast CT of the lumbar spine. CT HEAD WO CONTRAST   Final Result   No acute intracranial abnormality. Mild chronic appearing sphenoethmoid sinus mucosal disease. CT CERVICAL SPINE WO CONTRAST   Final Result   Multilevel degenerative changes with no acute abnormality of the cervical   spine. Irregular cavitary nodules at the bilateral lung apices. The CTA of the   chest has been scheduled. XR CHEST PORTABLE   Final Result   1. Patchy bibasilar airspace disease concerning for multifocal pneumonia   rather than atelectasis. 2. 1.5 cm rounded right upper lobe pulmonary nodule. Recommend CT of the   chest with contrast for further evaluation.                  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  Vancomycin started 12/23.    -echo done 12/24, ef 55%, no obvious wm abn, large mobile veg on tricuspid valve, mild tr  -ID consulted, apprec recs    Acute cystitis  - potentially bacteremic spread.  Ceftriaxone for now to cover gram negatives.  F/u urine culture.      Sepsis, present on arrival, due to above  - IVFs, trended lacate  - hyperbilirubinemia and thrombocytopenia are thought to be manifestations of the sepsis.  Monitored.      Back pain  - seems more pleuritic than spinal.  He is at risk for discitis or epidural abscess.  If symptoms continue, consider MRI with and without contrast.  He does not have any symptoms of cord compression, and he will probably already be committed to 6 weeks of IV abx.     Hypovolemic hyponatremia - will presumably improve with IVFs.    Demand ischemia  -Trended troponins, downtrending.     DVT Prophylaxis: lovenox.   Diet: DIET GENERAL;  Code Status: Full Code    PT/OT Eval Status: ordered and pending    Dispo - id consulted, pending workup    Jay Brannon MD

## 2020-12-26 NOTE — PROGRESS NOTES
Hospitalist Progress Note      PCP: No primary care provider on file. Date of Admission: 12/23/2020    Chief Complaint:  Pain all over, hurts to breathe  [facility] [place] [facility] Course:      HPI on admission: \"The patient is a pleasant 60 Y M with a h/o heroin IVDA, HCV, and HBV. Haile Christine presents with about a week or worsening pain all over his body.  The worse pain is a sharp, severe, stabbing sensation throughout his thorax when he breathes deeply.  He has had subjective chills.  WBC was 24 in the ED and CT showed diffuse septic pulmonary emboli.  Pain is constant, better with opioids. \"        Subjective: resting w/o issues, has been refusing subutex and clonidine(goes to [facility]), going for MRI, has noted jaundice in the past few weeks only       Medications:  Reviewed    Infusion Medications    sodium chloride Stopped (12/26/20 0331)     Scheduled Medications    cefTRIAXone (ROCEPHIN) IV  2 g Intravenous Q24H    influenza virus vaccine  0.5 mL Intramuscular Prior to discharge    sodium chloride flush  10 mL Intravenous 2 times per day    enoxaparin  40 mg Subcutaneous Daily    vancomycin  1,000 mg Intravenous Q12H    nicotine  1 patch Transdermal Daily     PRN Meds: perflutren lipid microspheres, magnesium sulfate, potassium chloride **OR** potassium alternative oral replacement **OR** potassium chloride, oxyCODONE, sodium chloride flush, polyethylene glycol, acetaminophen **OR** acetaminophen, prochlorperazine, diphenhydrAMINE, buprenorphine **AND** cloNIDine, traZODone      Intake/Output Summary (Last 24 hours) at 12/26/2020 0820  Last data filed at 12/26/2020 8350  Gross per 24 hour   Intake 720 ml   Output 1850 ml   Net -1130 ml       Physical Exam Performed:    /83   Pulse 87   Temp 97.8 °F (36.6 °C) (Oral)   Resp 20   Ht 5' 10\" (1.778 m)   Wt 145 lb (65.8 kg)   SpO2 92%   BMI 20.81 kg/m²     General appearance: No apparent distress, appears stated age and cooperative. HEENT: Pupils equal, round, and reactive to light. Conjunctivae/corneas clear. Neck: Supple, with full range of motion. No jugular venous distention. Trachea midline. Respiratory:  Normal respiratory effort. Clear to auscultation, bilaterally without Rales/Wheezes/Rhonchi. Cardiovascular: Regular rate and rhythm with normal S1/S2 without murmurs, rubs or gallops. Abdomen: Soft, non-tender, non-distended with normal bowel sounds. Musculoskeletal: No clubbing, cyanosis or edema bilaterally. Full range of motion without deformity. Skin: Skin color, texture, turgor normal.  No rashes or lesions. jaundiced  Neurologic:  Neurovascularly intact without any focal sensory/motor deficits. Cranial nerves: II-XII intact, grossly non-focal.  Psychiatric: Alert and oriented, thought content appropriate, normal insight  Capillary Refill: Brisk,< 3 seconds   Peripheral Pulses: +2 palpable, equal bilaterally       Labs:   Recent Labs     12/23/20  1405 12/24/20  1006   WBC 24.9* 22.2*   HGB 12.8* 11.7*   HCT 37.6* 34.2*   PLT 64* 46*     Recent Labs     12/23/20  1405 12/24/20  1006   * 131*   K 4.4 4.2   CL 92* 101   CO2 25 22   BUN 44* 37*   CREATININE 1.0 0.8*   CALCIUM 9.2 8.6     Recent Labs     12/23/20  1405 12/24/20  1006   AST 84* 105*   ALT 39 40   BILIDIR  --  4.3*   BILITOT 4.7* 5.4*   ALKPHOS 150* 125     No results for input(s): INR in the last 72 hours. Recent Labs     12/23/20  1405 12/23/20  2158   CKTOTAL 125  --    TROPONINI 0.05* 0.04*       Urinalysis:      Lab Results   Component Value Date    NITRU POSITIVE 12/23/2020    WBCUA 21-50 12/23/2020    BACTERIA 3+ 12/23/2020    RBCUA 3-4 12/23/2020    BLOODU SMALL 12/23/2020    SPECGRAV 1.015 12/23/2020    GLUCOSEU Negative 12/23/2020       Radiology:  CT CHEST PULMONARY EMBOLISM W CONTRAST   Final Result   Findings most compatible with diffuse septic emboli. Superimposed bilateral   lower lobe airspace opacities. No pulmonary arterial filling defect. Multiple prominent mediastinal lymph nodes, likely reactive. CT ABDOMEN PELVIS W IV CONTRAST Additional Contrast? None   Final Result   No acute abnormality detected within the abdomen and pelvis. Moderate splenomegaly. Absent right kidney. CT LUMBAR SPINE TRAUMA RECONSTRUCTION   Final Result   Unremarkable non-contrast CT of the lumbar spine. CT HEAD WO CONTRAST   Final Result   No acute intracranial abnormality. Mild chronic appearing sphenoethmoid sinus mucosal disease. CT CERVICAL SPINE WO CONTRAST   Final Result   Multilevel degenerative changes with no acute abnormality of the cervical   spine. Irregular cavitary nodules at the bilateral lung apices. The CTA of the   chest has been scheduled. XR CHEST PORTABLE   Final Result   1. Patchy bibasilar airspace disease concerning for multifocal pneumonia   rather than atelectasis. 2. 1.5 cm rounded right upper lobe pulmonary nodule. Recommend CT of the   chest with contrast for further evaluation.          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  Vancomycin started 12/23.    -echo done 12/24, ef 55%, no obvious wm abn, large mobile veg on tricuspid valve, mild tr  -ID consulted, apprec recs     Acute cystitis  - potentially bacteremic spread.  Ceftriaxone for now to cover gram negatives.  F/u urine culture.      Sepsis, present on arrival, due to above  - IVFs, trended lacate  - hyperbilirubinemia and thrombocytopenia are thought to be manifestations of the sepsis.  Monitored.      Back pain - seems more pleuritic than spinal.  He is at risk for discitis or epidural abscess.  If symptoms continue, consider MRI with and without contrast.  He does not have any symptoms of cord compression, and he will probably already be committed to 6 weeks of IV abx.     Hypovolemic hyponatremia  - will presumably improve with IVFs.    Demand ischemia  -Trended troponins, downtrending.     Tipzznelqejng-grzqjmdqmzxyzhrxbb-yonntfvsm of late  -Gi consulted, apprec mgmt  Hep b/hep c labs ordered    DVT Prophylaxis: lovenox.   Diet: DIET GENERAL;  Code Status: Full Code    PT/OT Eval Status: rec snf    Dispo - pending workup, MRI L spine, ID recs, GI recs    Leela Adan MD

## 2020-12-26 NOTE — CONSULTS
[address] ,  [address] [facility]  Phone: [phone]   GPD:[phone]  [address] [place] [address] [address],  [address] [facility], [facility] Roshni Dejesus  Phone: [phone]   SMB:[phone]    Gastroenterology H&P/Consult Note    Chief Complaint   Patient presents with    Generalized Body Aches     started two days ago, patient states my whole body hurts. patient denies fever and covid exposure. HPI     Thank you No ref. provider found and No primary care provider on file. for asking me to see Ginny Lorenzo in consultation. He is a 60y.o. year old homeless male with medical history of heroin IV drug use, chronic hepatitis B and C virus infection and history of right nephrectomy post trauma presents with complaints of worsening generalized body pains and chills of 1 week duration. Initial labs noted WBC 24.9, hemoglobin 12.8, hematocrit 37.6 and platelets 64, ESR 51, ,  alkaline phosphatase 150, ALT 39 AST 84, bilirubin 4.7, albumin 2.4, lipase 11. Urine drug screen was positive for opiates. CT angiogram chest on 12/23/2020 noted diffuse septic pulmonary emboli and superimposed bilateral lower lobe airspace opacities. Negative for pulmonary artery defect. Multiple prominent mediastinal lymph nodes likely reactive. TTE on 12/24/2020 noted for LVEF 55%, large mobile tricuspid valve vegetation, mild tricuspid regurgitation. Liver chemistries have since worsened to alkaline phosphatase 159, , ALT 58, bilirubin 6.2, albumin 1.6 on 12/26/2020. Patient has been started on IV antibiotics with Vancomycin and Rocephin. GI has been consulted for worsening jaundice and abnormal liver chemistries. Date of Admission:  12/23/2020  Date of Consultation:  12/26/2020    Last Encounter Reviewed: None  Pertinent PMH, FH, SH is reviewed below.   Last EGD: None  Last Colonoscopy: None    Health Maintenance   Topic Date Due  Pneumococcal 0-64 years Vaccine (1 of 1 - PPSV23) 09/17/1969    DTaP/Tdap/Td vaccine (1 - Tdap) 09/17/1982    Lipid screen  09/17/2003    Shingles Vaccine (1 of 2) 09/17/2013    Colon cancer screen colonoscopy  09/17/2013    Flu vaccine (1) 09/01/2020    Hepatitis C screen  Completed    HIV screen  Completed    Hepatitis A vaccine  Aged Out    Hepatitis B vaccine  Aged Out    Hib vaccine  Aged Out    Meningococcal (ACWY) vaccine  Aged Out     PAST MEDICAL HISTORY     Past Medical History:   Diagnosis Date    Back pain     Hepatitis B surface antigen positive 08/31/2017    Hepatitis C antibody positive in blood 08/31/2017     FAMILY HISTORY   History reviewed. No pertinent family history. SOCIAL HISTORY     Social History     Tobacco Use    Smoking status: Current Every Day Smoker     Packs/day: 1.00     Types: Cigarettes    Smokeless tobacco: Never Used   Substance Use Topics    Alcohol use: No    Drug use: Yes     Types: IV, Cocaine, Opiates , Marijuana     Comment: on subutex now.      SURGICAL HISTORY     Past Surgical History:   Procedure Laterality Date    KIDNEY REMOVAL Right     WRIST SURGERY       ALLERGIES     Allergies   Allergen Reactions    Codeine      CURRENT MEDICATIONS      vancomycin (VANCOCIN) intermittent dosing (placeholder)   Other RX Placeholder    cefTRIAXone (ROCEPHIN) IV  2 g Intravenous Q24H    influenza virus vaccine  0.5 mL Intramuscular Prior to discharge    sodium chloride flush  10 mL Intravenous 2 times per day    enoxaparin  40 mg Subcutaneous Daily    nicotine  1 patch Transdermal Daily      sodium chloride Stopped (12/26/20 0331)     perflutren lipid microspheres, magnesium sulfate, potassium chloride **OR** potassium alternative oral replacement **OR** potassium chloride, oxyCODONE, sodium chloride flush, polyethylene glycol, acetaminophen **OR** acetaminophen, prochlorperazine, diphenhydrAMINE, buprenorphine **AND** cloNIDine, traZODone HOME MEDICATIONS  [unfilled]  IMMUNIZATIONS   There is no immunization history for the selected administration types on file for this patient. REVIEW OF SYSTEMS   See HPI for further details and pertinent postiives. Negative for the following:  Constitutional: Negative for weight change. Negative for appetite change and fatigue. HENT: Negative for nosebleeds, sore throat, mouth sores, trouble swallowing and voice change. Respiratory: Negative for cough, choking and chest tightness. Cardiovascular: Negative for chest pain   Gastrointestinal: See HPI  Musculoskeletal: Negative for arthralgias. Skin: Negative for pallor. Neurological: Negative for weakness and light-headedness. Hematological: Negative for adenopathy. Does not bruise/bleed easily. Psychiatric/Behavioral: Negative for suicidal ideas. PHYSICAL EXAM   VITAL SIGNS: /83   Pulse 87   Temp 97.8 °F (36.6 °C) (Oral)   Resp 20   Ht 5' 10\" (1.778 m)   Wt 145 lb (65.8 kg)   SpO2 92%   BMI 20.81 kg/m²   With regards to weight, he reports lost 15 lbs over 1 year  Review of available records reveals: Wt Readings from Last 50 Encounters:   12/23/20 145 lb (65.8 kg)   06/26/19 160 lb (72.6 kg)   08/31/17 160 lb (72.6 kg)     Constitutional: Disheveled, No acute distress, Non-toxic appearance. HENT: Normocephalic, Atraumatic, Bilateral external ears normal, Oropharynx moist, No oral exudates, Nose normal.   Eyes: Sclera icterus conjunctiva normal, No discharge. Neck: Normal range of motion, No tenderness, Supple, No stridor. Cardiovascular: Normal heart rate, Normal rhythm, diastolic murmur, No rubs, No gallops. Thorax & Lungs: Normal breath sounds, No respiratory distress, No wheezing, No chest tenderness. No gynecomastia. Abdomen: normal bowel sounds, soft, mild tenderness in the epigastrium and right upper quadrant, non distended, midline surgical scar consistent with stated surgery, no hernia    Rectal:  Deferred. Skin: Warm, Dry, No erythema, No rash. No bruising. No spider hemangiomas. Back: No tenderness, No CVA tenderness. Lower Extremities: Intact distal pulses, No edema, No tenderness, No cyanosis, No clubbing. Neurologic: Alert & oriented x 3, Normal motor function, Normal sensory function, No focal deficits noted. No asterixis. RADIOLOGY/PROCEDURES       Results for orders placed during the hospital encounter of 12/23/20   CT ABDOMEN PELVIS W IV CONTRAST Additional Contrast? None    Narrative EXAMINATION:  CT OF THE ABDOMEN AND PELVIS WITH CONTRAST 12/23/2020 1:01 pm    TECHNIQUE:  CT of the abdomen and pelvis was performed with the administration of  intravenous contrast. Multiplanar reformatted images are provided for review. Dose modulation, iterative reconstruction, and/or weight based adjustment of  the mA/kV was utilized to reduce the radiation dose to as low as reasonably  achievable. COMPARISON:  08/31/2017    HISTORY:  ORDERING SYSTEM PROVIDED HISTORY: abdominal pain, sepsis  TECHNOLOGIST PROVIDED HISTORY:  If patient is on cardiac monitor and/or pulse ox, they may be taken off  cardiac monitor and pulse ox, left on O2 if currently on. All monitors  reattached when patient returns to room. Additional Contrast?->None  Reason for exam:->abdominal pain, sepsis  Reason for Exam: patient fell, severe abdominal pain  Acuity: Acute  Type of Exam: Initial  Relevant Medical/Surgical History: right kidney removed    FINDINGS:  Lower Chest: Please see CT PA performed concurrently. 1    Organs: Evaluation is degraded by streak artifact generated by the patient's  arms at his side. That said, the liver is unremarkable in appearance. Portal vein is patent. Gallbladder unremarkable. Moderate splenomegaly is  noted. Normal adrenals. Normal pancreas. The right kidney is absent. Left kidney is unremarkable. GI/Bowel: No large bowel abnormalities are detected. Distal esophagus, stomach, duodenal sweep, and the remainder of the small  bowel are unremarkable. No evidence of bowel obstruction. Pelvis: Prostate and urinary bladder unremarkable. No free pelvic fluid. Peritoneum/Retroperitoneum: Abdominal aorta normal in caliber. Superior  mesenteric artery is enhancing. Mild atherosclerotic plaque identified. No  retroperitoneal lymphadenopathy is seen. Bones/Soft Tissues: No acute or suspicious bony abnormality. Mild  subcutaneous edema is noted throughout the abdomen pelvis. Impression No acute abnormality detected within the abdomen and pelvis. Moderate splenomegaly. Absent right kidney.          COURSE & MEDICAL DECISION MAKING     Lab Results   Component Value Date    WBC 21.9 (H) 12/26/2020    HGB 10.2 (L) 12/26/2020    HCT 30.6 (L) 12/26/2020    PLT 73 (L) 12/26/2020    ALT 58 (H) 12/26/2020     (H) 12/26/2020     (L) 12/26/2020    K 4.2 12/26/2020    CL 99 12/26/2020    CREATININE 0.8 (L) 12/26/2020    BUN 35 (H) 12/26/2020    CO2 20 (L) 12/26/2020    INR 1.05 06/21/2013    LABMICR YES 12/23/2020     Lab Results   Component Value Date    BILIDIR 4.3 (H) 12/24/2020     No results found for: PTH, CAION, PHOS  Lab Results   Component Value Date    LABALBU 1.6 12/26/2020    ALKPHOS 159 12/26/2020    ALT 58 12/26/2020     12/26/2020    BILITOT 6.2 12/26/2020    BILIDIR 4.3 12/24/2020     Lab Results   Component Value Date    LIPASE 11.0 (L) 12/23/2020    LIPASE 16.0 08/31/2017    LIPASE 28 07/25/2010     No results found for: AMYLASE  Lab Results   Component Value Date    INR 1.05 06/21/2013    PROTIME 11.9 06/21/2013     No results found for: SMOOTHMUSCAB, MITOAB  No results found for: ANAHI  Lab Results   Component Value Date    HEPAIGM Non-reactive 08/31/2017    HEPBIGM Non-reactive 08/31/2017     No components found for: HEPATITISCRN, HCVIUARUP, HCVRNAINTERP, EERHCVRNA  Lab Results   Component Value Date HIV1X2 Non-reactive 08/31/2017     Lab Results   Component Value Date    SEDRATE 51 (H) 12/23/2020      Lab Results   Component Value Date    .8 (H) 12/23/2020     . FINAL IMPRESSION/ASSESSMENT/PLAN       1. 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 Abnormal liver chemistries -mixed pattern with cholestasis predominance - likely due to septic emboli with endocarditis. To rule out biliary obstruction  3. History of chronic hepatitis C/B virus infection. Suspect underlying cirrhosis (given biochemical evidence of cirrhosis with hypoalbuminemia, and thrombocytopenia)    Plan:  Continue IV antibiotics per ID recommendations  Monitor LFTs, PT/INR daily  Obtain an MRI/MRCP of abdomen to further evaluate biliary tree  Outpatient evaluation for hepatitis C and B virus treatment. GI to follow with you    1. The patient indicates understanding of these issues and agrees with the plan. 2.  I reviewed the patient's medical information and medical history. 3.  I have reviewed the past medical, family, and social history sections including the medications and allergies listed in the above medical record. Thank you for involving [facility] Gastroenterology in [facility] care. For further questions or concerns, we can best be reached through perfect serv.         Naresh Cyr 12/26/20 11:14 AM EST    [facility]   Phone [phone]   Fax [phone]

## 2020-12-26 NOTE — PROGRESS NOTES
Patient AOx4, VSS and focused assessment completed. Patient refusing COWS protocol medications. Loss of IV access last night- plans to request a PICC as we have not been able to establish new PIV. Patient eyes and skin are jaundice. RN administered PRN Anh per prn orders. RN will continue to monitor.

## 2020-12-26 NOTE — PROGRESS NOTES
Occupational Therapy   Occupational Therapy Initial Assessment  Date: 2020   Patient Name: Mike Ryan  MRN: [id number]     : 1965    Date of Service: 2020    Discharge Recommendations:  [address]  OT Equipment Recommendations  Other: defer to facility    Assessment   Performance deficits / Impairments: Decreased functional mobility ; Decreased endurance;Decreased ADL status; Decreased high-level IADLs;Decreased balance  Assessment: Pt is a 60year old male admitted to [facility] with Dx of acute septic PE. Pt participated in OT eval/tx this date. Pt's participation limited by back pain. Pt with impaired self care and transfer status d/t high levels of pain, decreased strength and decreased endurance. Pt will benefit from OT to maximize safety and independence with daily living tasks. Treatment Diagnosis: Impaired self care and txfr status. Prognosis: Fair;Good  Decision Making: Medium Complexity  OT Education: OT Role;Plan of Care;Precautions; ADL Adaptive Strategies;Transfer Training;Energy Conservation  Patient Education: log roll  REQUIRES OT FOLLOW UP: Yes  Activity Tolerance  Activity Tolerance: Patient limited by pain  Safety Devices  Safety Devices in place: Yes  Type of devices: (Pt leaving floor for MRI)           Patient Diagnosis(es): The encounter diagnosis was Septic embolism ([facility] [address] has a past medical history of Back pain, Hepatitis B surface antigen positive, and Hepatitis C antibody positive in blood. has a past surgical history that includes Wrist surgery and Kidney removal (Right). Treatment Diagnosis: Impaired self care and txfr status.       Restrictions  Restrictions/Precautions  Restrictions/Precautions: Up as Tolerated, Fall Risk  Position Activity Restriction  Other position/activity restrictions: log roll technique    Subjective   General  Chart Reviewed: Yes  Patient assessed for rehabilitation services?: Yes Additional Pertinent Hx: Per H&P: \"patient is a pleasant 60 Y M with a h/o heroin IVDA, HCV, and HBV. He presents with about a week or worsening pain all over his body. The worse pain is a sharp, severe, stabbing sensation throughout his thorax when he breathes deeply. He has had subjective chills. WBC was 24 in the ED and CT showed diffuse septic pulmonary emboli. Pain is constant, better with opioids. \"  Family / Caregiver Present: No  Referring Practitioner: Richard Barnett MD  Diagnosis: Acute Septic PE  Subjective  Subjective: Pt in room in bed. Agreeable to OT eval/tx with encouragement. General Comment  Comments: RN cleared pt for therapy. Patient Currently in Pain: Yes  Pain Assessment  Pain Level: 10  Pain Location: Back  Vital Signs  Patient Currently in Pain: Yes  Social/Functional History  Social/Functional History  Lives With: Alone  Type of Home: Homeless  Home Equipment: (No DME)  ADL Assistance: Independent  Homemaking Assistance: Independent  Ambulation Assistance: Independent  Transfer Assistance: Independent  Active : No  Mode of Transportation: Bus       Objective        Orientation  Overall Orientation Status: Within Functional Limits  Observation/Palpation  Posture: Poor  Balance  Sitting Balance: Supervision(EOB)  Standing Balance: (Dynamic: mod x2. Static: min x1-2)  Standing Balance  Time: ~1 min  Activity: side step to [facility]  ADL  Feeding: Setup  UE Dressing: (min A to thread UE into gown as pt limited by back pain)  LE Dressing: Dependent/Total(for socks)         Bed Mobility  Educated on log roll for supine<>sit. Max x2 to supine to EOB and EOB to supine. Transfers  Sit to stand: 2 Person assistance(min x2 to stand, Mod x2 to side step to [facility])  Transfer Comments: Pt limited by pain and anticipation of pain.     Vision - Basic Assessment  Visual History: No significant visual history     Cognition  Overall Cognitive Status: Exceptions Arousal/Alertness: Appropriate responses to stimuli  Following Commands: Follows one step commands with increased time(limited by pain and anticipation of pain)  Attention Span: Appears intact  Memory: Appears intact  Insights: Fully aware of deficits          Sensation  Overall Sensation Status: (Reports occasional N/T in RUE)        LUE AROM (degrees)  LUE General AROM: Demo active shoulder flexion to ~100o. Elbow/hand WFL. RUE AROM (degrees)  RUE General AROM: Demo active shoulder flexion to ~90o. Elbow/hand WFL. LUE Strength  LUE Strength Comment: Not formally assessed d/t back pain  RUE Strength  RUE Strength Comment: Not formally assessed d/t back pain                   Plan   Plan  Times per week: 3-5x  Current Treatment Recommendations: Strengthening, Patient/Caregiver Education & Training, Balance Training, Functional Mobility Training, Endurance Training, Self-Care / ADL    AM-PAC Score        AM-[facility] Inpatient Daily Activity Raw Score: 14 (12/26/20 1139)  AM-PAC Inpatient ADL T-Scale Score : 33.39 (12/26/20 1139)  ADL Inpatient CMS 0-100% Score: 59.67 (12/26/20 1139)  ADL Inpatient CMS G-Code Modifier : CK (12/26/20 1139)    Goals  Short term goals  Time Frame for Short term goals: 1/2/2021 unless otherwise stated  Short term goal 1: Pt will complete toilet transfers with min A x1 with AD as needed by 12/29/2020. Short term goal 2: Pt will complete 15 reps of BUE AROM for strength and endurance training. Short term goal 3: Pt will complete LB dressing with set-up and AE as needed. Patient Goals   Patient goals : \"to be able to move around\"       Therapy Time   Individual Concurrent Group Co-treatment   Time In 1031         Time Out 1052         Minutes 21         Timed Code Treatment Minutes: 11 Minutes     If patient is discharged prior to next occupational therapy treatment, this note will serve as the patient's discharge summary.       Holley Gunter OT

## 2020-12-26 NOTE — PROGRESS NOTES
PT Education: Goals; General Safety;Gait Training;PT Role;Disease Specific Education;Plan of Care; Functional Mobility Training;Home Exercise Program;Pressure Relief;Precautions; Injury Prevention;Transfer Training  Patient Education: Patient educated on how to safely complete log roll technique to minimize his pain. Educated on importance of out of bed mobility and participating in his exercises. Patient will need education reinforcement. Barriers to Learning: impaired cognition. REQUIRES PT FOLLOW UP: Yes  Activity Tolerance  Activity Tolerance: Patient limited by fatigue;Patient limited by pain; Patient limited by endurance  Activity Tolerance: Vitals 92% 100 BPM       Patient Diagnosis(es): The encounter diagnosis was Septic embolism ([facility] [address] has a past medical history of Back pain, Hepatitis B surface antigen positive, and Hepatitis C antibody positive in blood. has a past surgical history that includes Wrist surgery and Kidney removal (Right). Restrictions  Restrictions/Precautions  Restrictions/Precautions: Up as Tolerated, Fall Risk  Position Activity Restriction  Other position/activity restrictions: log rol technique  Vision/Hearing  Vision: Within Functional Limits  Hearing: Within functional limits     Subjective  General  Chart Reviewed: Yes  Patient assessed for rehabilitation services?: Yes  Response To Previous Treatment: Not applicable  Family / Caregiver Present: No  Referring Practitioner: Tarik Archer  Referral Date : 12/24/20  Diagnosis: septic PE, presumed endocarditis  Follows Commands: Impaired  Other (Comment): increased time to follow commands. General Comment  Comments: Patient in the supine position in the bed upon entry of therapy staff. Subjective  Subjective: Patient agreed to participate with encouragement.   Pain Screening  Patient Currently in Pain: Yes  Pain Assessment  Pain Assessment: 0-10  Pain Level: 9  Pain Location: Back Functional Pain Assessment: Prevents or interferes some active activities and ADLs  Non-Pharmaceutical Pain Intervention(s): Ambulation/Increased Activity; Distraction; Emotional support;Repositioned; Rest  Response to Pain Intervention: Patient Satisfied  Vital Signs  Patient Currently in Pain: Yes  Pre Treatment Pain Screening  Intervention List: Patient able to continue with treatment    Orientation  Orientation  Overall Orientation Status: Within Functional Limits  Social/Functional History  Social/Functional History  Lives With: Alone  Type of Home: Homeless  Home Equipment: (No DME)  ADL Assistance: Independent  Homemaking Assistance: Independent  Ambulation Assistance: Independent  Transfer Assistance: Independent  Active : No(patient said that he is normally able to get on the bus by himself)  Mode of Transportation: Bus  Cognition   Cognition  Overall Cognitive Status: Exceptions  Arousal/Alertness: Delayed responses to stimuli  Following Commands: Follows multistep commands with repitition; Follows multistep commands with increased time  Attention Span: Attends with cues to redirect  Memory: Decreased recall of precautions  Problem Solving: Decreased awareness of errors;Assistance required to generate solutions;Assistance required to correct errors made;Assistance required to identify errors made;Assistance required to implement solutions  Insights: Decreased awareness of deficits  Initiation: Requires cues for some  Sequencing: Requires cues for some  Cognition Comment: frequent cueing for safety and to focus on therapy task. Objective     Observation/Palpation  Posture: Poor    PROM RLE (degrees)  RLE General PROM: patient refused PROM assessment  AROM RLE (degrees)  RLE General AROM: decreased bilateral hip and knee AROM due to pain/fatigue.   PROM LLE (degrees)  LLE General PROM: patient refused PROM assessment  AROM LLE (degrees)

## 2020-12-27 NOTE — PROGRESS NOTES
Hospitalist Progress Note      PCP: No primary care provider on file. Date of Admission: 2020    Chief Complaint:  Pain all over, hurts to breathe  [facility] [place] [facility] Course:      HPI on admission:  \"The patient is a pleasant 60 Y M with a h/o heroin IVDA, HCV, and HBV. Staci Olvera presents with about a week or worsening pain all over his body.  The worse pain is a sharp, severe, stabbing sensation throughout his thorax when he breathes deeply.  He has had subjective chills.  WBC was 24 in the ED and CT showed diffuse septic pulmonary emboli.  Pain is constant, better with opioids. \"        Subjective: resting w/o issues, has been refusing subutex and clonidine(goes to [facility]),    Medications:  Reviewed    Infusion Medications    sodium chloride 100 mL/hr at 20 0431     Scheduled Medications    vancomycin (VANCOCIN) intermittent dosing (placeholder)   Other RX Placeholder    sodium chloride flush  10 mL Intravenous 2 times per day    vancomycin  1,250 mg Intravenous Q12H    cefTRIAXone (ROCEPHIN) IV  2 g Intravenous Q24H    influenza virus vaccine  0.5 mL Intramuscular Prior to discharge    sodium chloride flush  10 mL Intravenous 2 times per day    enoxaparin  40 mg Subcutaneous Daily    nicotine  1 patch Transdermal Daily     PRN Meds: sodium chloride flush, perflutren lipid microspheres, magnesium sulfate, potassium chloride **OR** potassium alternative oral replacement **OR** potassium chloride, oxyCODONE, sodium chloride flush, polyethylene glycol, acetaminophen **OR** acetaminophen, prochlorperazine, diphenhydrAMINE, [] buprenorphine **AND** cloNIDine, traZODone      Intake/Output Summary (Last 24 hours) at 2020 0916  Last data filed at 2020 0854  Gross per 24 hour   Intake 1900 ml   Output 1800 ml   Net 100 ml       Physical Exam Performed: /79   Pulse 108   Temp 98 °F (36.7 °C) (Oral)   Resp 30   Ht 5' 10\" (1.778 m)   Wt 145 lb (65.8 kg)   SpO2 93%   BMI 20.81 kg/m²     General appearance: No apparent distress, appears stated age and cooperative. HEENT: Pupils equal, round, and reactive to light. Conjunctivae/corneas clear. Neck: Supple, with full range of motion. No jugular venous distention. Trachea midline. Respiratory:  Normal respiratory effort. Clear to auscultation, bilaterally without Rales/Wheezes/Rhonchi. Cardiovascular: Regular rate and rhythm with normal S1/S2 without murmurs, rubs or gallops. Abdomen: Soft, non-tender, non-distended with normal bowel sounds. Musculoskeletal: No clubbing, cyanosis or edema bilaterally. Full range of motion without deformity. Skin: Skin color, texture, turgor normal.  No rashes or lesions. jaundiced  Neurologic:  Neurovascularly intact without any focal sensory/motor deficits. Cranial nerves: II-XII intact, grossly non-focal.  Psychiatric: Alert and oriented, thought content appropriate, normal insight  Capillary Refill: Brisk,< 3 seconds   Peripheral Pulses: +2 palpable, equal bilaterally          Labs:   Recent Labs     12/24/20  1006 12/26/20  0837 12/27/20  0440   WBC 22.2* 21.9* 21.2*   HGB 11.7* 10.2* 9.1*   HCT 34.2* 30.6* 27.2*   PLT 46* 73* 92*     Recent Labs     12/24/20  1006 12/26/20  0837 12/27/20  0440   * 127* 129*   K 4.2 4.2 4.3    99 102   CO2 22 20* 19*   BUN 37* 35* 41*   CREATININE 0.8* 0.8* 1.0   CALCIUM 8.6 7.8* 7.6*   PHOS  --   --  3.5     Recent Labs     12/24/20  1006 12/26/20  0837 12/27/20  0440   * 153* 111*   ALT 40 58* 50*   BILIDIR 4.3*  --  3.7*   BILITOT 5.4* 6.2* 4.7*   ALKPHOS 125 159* 162*     Recent Labs     12/27/20  0440   INR 2.02*     No results for input(s): CKTOTAL, TROPONINI in the last 72 hours.     Urinalysis:      Lab Results   Component Value Date    NITRU POSITIVE 12/23/2020    WBCUA 21-50 12/23/2020 Multilevel degenerative changes with no acute abnormality of the cervical   spine. Irregular cavitary nodules at the bilateral lung apices. The CTA of the   chest has been scheduled. XR CHEST PORTABLE   Final Result   1. Patchy bibasilar airspace disease concerning for multifocal pneumonia   rather than atelectasis. 2. 1.5 cm rounded right upper lobe pulmonary nodule. Recommend CT of the   chest with contrast for further evaluation. Assessment/Plan:    Active Hospital Problems    Diagnosis    Septic embolism ([address] Ramon Wilson    Elevated liver enzymes [R74.8]    Chronic hepatitis C without hepatic coma (HCC) [B18.2]    Acute septic pulmonary embolus without acute cor pulmonale (HCC) [I26.90]            Septic pulmonary emboli due to  R-sided endocarditis   - suspect staph.  Vancomycin started 12/23.    -echo done 12/24, ef 55%, no obvious wm abn, large mobile veg on tricuspid valve, mild tr  -ID consulted, apprec recs   -picc placed 12/26    Acute cystitis  - potentially bacteremic spread.  Ceftriaxone for now to cover gram negatives.  F/u urine culture.      Sepsis, present on arrival, due to above  - IVFs, trended lacate  - hyperbilirubinemia and thrombocytopenia are thought to be manifestations of the sepsis.  Monitored.      Back pain  - seems more pleuritic than spinal.  He is at risk for discitis or epidural abscess.  If symptoms continue, consider MRI with and without contrast.  He does not have any symptoms of cord compression, and he will probably already be committed to 6 weeks of IV abx.     Hypovolemic hyponatremia  - will presumably improve with IVFs.      Demand ischemia  -Trended troponins, downtrending.     Jihaxogzaytpf-wmbldgwnsrnihughqh-wtaktmgwv of late, MELD NA score of 26  -Gi consulted, apprec mgmt, per note pt has chronic hep c and b  -Hep b/hep c labs ordered   -MRI abd w/o biliary obstruction  -consider transfer to tertiary care center if not improving

## 2020-12-27 NOTE — PROGRESS NOTES
Conce[address] ,  [address] [facility]  Phone: [phone]   MLU:[phone]  [address] [place] [address],  [address] [facility], [address] [place] [address]  Phone: [phone]   Fax:[phone]    HPI: Jacky Longoria is a(n)55 y.o. male homeless, with medical history of heroin IV drug use, chronic hepatitis B and C virus infection and history of right nephrectomy post trauma admitted for work-up and treatment for   Acute septic pulmonary embolus without acute cor pulmonale ([facility] [address] [I26.90]. We are following for jaundice and abnormal liver chemistries. Patient was seen and examined about 10 a.m. he reports generalized body pains including chest pain. Current Hospital Schedued Meds   vancomycin (VANCOCIN) intermittent dosing (placeholder)   Other RX Placeholder    sodium chloride flush  10 mL Intravenous 2 times per day    vancomycin  1,250 mg Intravenous Q12H    cefTRIAXone (ROCEPHIN) IV  2 g Intravenous Q24H    influenza virus vaccine  0.5 mL Intramuscular Prior to discharge    sodium chloride flush  10 mL Intravenous 2 times per day    enoxaparin  40 mg Subcutaneous Daily    nicotine  1 patch Transdermal Daily     Current Hospital IV Meds   sodium chloride 100 mL/hr at 20 0431     Current Hospital Prn Meds  sodium chloride flush, perflutren lipid microspheres, magnesium sulfate, potassium chloride **OR** potassium alternative oral replacement **OR** potassium chloride, oxyCODONE, sodium chloride flush, polyethylene glycol, acetaminophen **OR** acetaminophen, prochlorperazine, diphenhydrAMINE, [] buprenorphine **AND** cloNIDine, traZODone    I/O last 3 completed shifts:   In: 1900 [P.O.:860; I.V.:1040]  Out: 1500 [Urine:1500]  BP (!) 142/78   Pulse 99   Temp 98 °F (36.7 °C) (Oral)   Resp 16   Ht 5' 10\" (1.778 m)   Wt 145 lb (65.8 kg)   SpO2 93%   BMI 20.81 kg/m²     Wt Readings from Last 3 Encounters:   20 145 lb (65.8 kg) 06/26/19 160 lb (72.6 kg)   08/31/17 160 lb (72.6 kg)       Physical Exam:  VITAL SIGNS: BP (!) 142/78   Pulse 99   Temp 98 °F (36.7 °C) (Oral)   Resp 16   Ht 5' 10\" (1.778 m)   Wt 145 lb (65.8 kg)   SpO2 93%   BMI 20.81 kg/m²   Wt Readings from Last 3 Encounters:   12/23/20 145 lb (65.8 kg)   06/26/19 160 lb (72.6 kg)   08/31/17 160 lb (72.6 kg)     Constitutional: Jaundiced, disheveled, No acute distress, Non-toxic appearance. HENT: Normocephalic, Atraumatic, Bilateral external ears normal, Oropharynx moist, No oral exudates, Nose normal.   Eyes: Scleral icterus, conjunctiva normal, No discharge. Neck: Normal range of motion, No tenderness, Supple, No stridor. Lymphatic: No cervical, subclavian, or axillary lymphadenopathy. Cardiovascular: Normal heart rate, Normal rhythm, No murmurs, No rubs, No gallops. Thorax & Lungs: Normal breath sounds, No respiratory distress, No wheezing, No chest tenderness. Abdomen: normal bowel sounds, soft, non tender, non distended, midline surgical scar scars consistent with stated surgert, no hernias. Skin: Warm, Dry, No erythema, No rash. No bruising. No spider hemangiomas. Back: No tenderness, No CVA tenderness. Lower Extremities: Intact distal pulses, No edema, No tenderness, No cyanosis, No clubbing. Neurologic: Alert & oriented x 3, Normal motor function, Normal sensory function, No asterixis.       Lab Results   Component Value Date    ALT 50 (H) 12/27/2020     (H) 12/27/2020    ALKPHOS 162 (H) 12/27/2020    BILIDIR 3.7 (H) 12/27/2020    PROT 6.9 12/27/2020    LABALBU 1.6 (L) 12/27/2020    INR 2.02 (H) 12/27/2020    LIPASE 11.0 (L) 12/23/2020     Lab Results   Component Value Date    WBC 21.2 (H) 12/27/2020    HGB 9.1 (L) 12/27/2020    HCT 27.2 (L) 12/27/2020    MCV 88.0 12/27/2020    PLT 92 (L) 12/27/2020     Lab Results   Component Value Date     12/27/2020    K 4.3 12/27/2020    K 4.2 12/24/2020     12/27/2020 CO2 19 12/27/2020    BUN 41 12/27/2020     Lab Results   Component Value Date    CREATININE 1.0 12/27/2020       Results for orders placed during the hospital encounter of 12/23/20   CT ABDOMEN PELVIS W IV CONTRAST Additional Contrast? None    Narrative EXAMINATION:  CT OF THE ABDOMEN AND PELVIS WITH CONTRAST 12/23/2020 1:01 pm    TECHNIQUE:  CT of the abdomen and pelvis was performed with the administration of  intravenous contrast. Multiplanar reformatted images are provided for review. Dose modulation, iterative reconstruction, and/or weight based adjustment of  the mA/kV was utilized to reduce the radiation dose to as low as reasonably  achievable. COMPARISON:  08/31/2017    HISTORY:  ORDERING SYSTEM PROVIDED HISTORY: abdominal pain, sepsis  TECHNOLOGIST PROVIDED HISTORY:  If patient is on cardiac monitor and/or pulse ox, they may be taken off  cardiac monitor and pulse ox, left on O2 if currently on. All monitors  reattached when patient returns to room. Additional Contrast?->None  Reason for exam:->abdominal pain, sepsis  Reason for Exam: patient fell, severe abdominal pain  Acuity: Acute  Type of Exam: Initial  Relevant Medical/Surgical History: right kidney removed    FINDINGS:  Lower Chest: Please see CT PA performed concurrently. 1    Organs: Evaluation is degraded by streak artifact generated by the patient's  arms at his side. That said, the liver is unremarkable in appearance. Portal vein is patent. Gallbladder unremarkable. Moderate splenomegaly is  noted. Normal adrenals. Normal pancreas. The right kidney is absent. Left kidney is unremarkable. GI/Bowel: No large bowel abnormalities are detected. Distal esophagus, stomach, duodenal sweep, and the remainder of the small  bowel are unremarkable. No evidence of bowel obstruction. Pelvis: Prostate and urinary bladder unremarkable. No free pelvic fluid. Peritoneum/Retroperitoneum: Abdominal aorta normal in caliber.   Superior mesenteric artery is enhancing. Mild atherosclerotic plaque identified. No  retroperitoneal lymphadenopathy is seen. Bones/Soft Tissues: No acute or suspicious bony abnormality. Mild  subcutaneous edema is noted throughout the abdomen pelvis. Impression No acute abnormality detected within the abdomen and pelvis. Moderate splenomegaly. Absent right kidney. MRI abdomen/MRCP 12/26/2020:   Suboptimal post-contrast imaging of the abdomen, as gadolinium contrast   material had already been administered for lumbar spine MRI.       1. No biliary obstruction. 2. Faintly nodular contour of the liver.  Recommend clinical correlation for   diffuse liver disease. 3. New small volume ascites. 4. Mild splenomegaly of uncertain etiology. A/P  1. Acute infective endocarditis with septic pulmonary emboli    Continue IV antibiotics per ID recs for endocarditis. 2.  Decompensated Cirrhosis hepatitis C/B virus infection with coagulopathy and small ascites likely due to infective endocarditis with septic emboli. Ruled out biliary obstruction on MRCP. -- Improving LFTs today    MELD Na = 26 ; CPT class = C;   Will check for acute hepatitis A and B virus infections. Diet 100 g protein, low sodium < 2 gm/day  Folic acid, thiamine, multivitamin  Fluid restriction to 1 to 1.5 mL/day with hyponatremia sNa < 130  Hold Lasix 40 mg p.o. daily and Spironolactone 100 mg p.o. daily for now  Monitor CBC, BMP, LFTs, PT/INR daily  Continue IV antibiotics per ID recs for endocarditis. To consider transfering to tertiary level of care if non improving. Elective EGD to screen for varices    3.  Hepatic encephalopathy (Stage 1) in setting of acute infective endocarditis   Start lactulose 3 times daily titrate to 2-3 soft bowel movements daily  Monitor electrolytes and supplement as needed    GI to continue to follow        Active Problems:    Acute septic pulmonary embolus without acute cor pulmonale (HCC)

## 2020-12-28 NOTE — CARE COORDINATION
Per call from daughter, Brea Garcia, primary contact phone number for her changed to [phone]. Updated in emergency contacts per this CM.

## 2020-12-28 NOTE — PROGRESS NOTES
Elio Acosta     [facility],  [address] [facility]  Phone: [phone] [facility],  [address] [facility], [address] [place] [address]  Phone: [phone]   Fax:[phone]    Ivette Pizarro is a(n)55 y.o. male homeless, with medical history of heroin IV drug use, chronic hepatitis B and C virus infection and history of right nephrectomy post trauma admitted for work-up and treatment for Acute septic pulmonary embolus without acute cor pulmonale ([facility] [address] [I26.90].    We are following for jaundice and abnormal liver chemistries.     Patient was seen and examined about 9 a.m. He reports persisting chest pain. Denies abdominal pain, nausea, vomiting, fever, chills.      Current Hospital Schedued Meds   therapeutic multivitamin-minerals  1 tablet Oral Daily    lactulose  20 g Oral TID    vancomycin (VANCOCIN) intermittent dosing (placeholder)   Other RX Placeholder    sodium chloride flush  10 mL Intravenous 2 times per day    cefTRIAXone (ROCEPHIN) IV  2 g Intravenous Q24H    influenza virus vaccine  0.5 mL Intramuscular Prior to discharge    sodium chloride flush  10 mL Intravenous 2 times per day    enoxaparin  40 mg Subcutaneous Daily    nicotine  1 patch Transdermal Daily     Current Hospital IV Meds   sodium chloride 100 mL/hr at 20 0503     Current Hospital Prn Meds  sodium chloride flush, perflutren lipid microspheres, oxyCODONE, sodium chloride flush, acetaminophen **OR** acetaminophen, prochlorperazine, diphenhydrAMINE, [] buprenorphine **AND** cloNIDine, traZODone    I/O last 3 completed shifts:  In: -   Out: 1400 [Urine:1400]  /82   Pulse 94   Temp 99 °F (37.2 °C) (Oral)   Resp 28   Ht 5' 10\" (1.778 m)   Wt 145 lb (65.8 kg)   SpO2 95%   BMI 20.81 kg/m²   BMs: 3 BMs yesterday and 1 BM this morning  Wt Readings from Last 3 Encounters:   20 145 lb (65.8 kg)   19 160 lb (72.6 kg)   17 160 lb (72.6 kg)  Continue IV antibiotics per ID recs for endocarditis.      2.  Decompensated Cirrhosis hepatitis C/B virus infection with coagulopathy and small ascites likely due to infective endocarditis with septic emboli. Ruled out biliary obstruction on MRCP. -- Improving LFTs     MELD Na = 25 ; CPT class = C;   Negative for acute hepatitis A and B virus infections. Diet 100 g protein, low sodium < 2 gm/day  Folic acid, thiamine, multivitamin  Fluid restriction to 1 to 1.5 mL/day with hyponatremia sNa < 130  Hold Lasix 40 mg p.o. daily and Spironolactone 100 mg p.o. daily for now  Monitor CBC, BMP, LFTs, PT/INR daily  Continue IV antibiotics per ID recs for endocarditis. Elective EGD to screen for varices     3. Hepatic encephalopathy (Stage 1) in setting of acute infective endocarditis   lactulose 3 times daily titrate to 2-3 soft bowel movements daily  Monitor electrolytes and supplement as needed     GI to continue to follow        Principal Problem:    Septic embolism (HCC)  Active Problems:    Acute septic pulmonary embolus without acute cor pulmonale (HCC)    Elevated liver enzymes    Chronic hepatitis C without hepatic coma (HCC)    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) ([facility] [address]    Hepatic encephalopathy (HCC)  Resolved Problems:    * No resolved hospital problems.  *    Patient Active Problem List   Diagnosis Code    Gastroenteritis K52.9    Nausea & vomiting R11.2    Leukocytosis D72.829    Marijuana smoker F12.90    Acute septic pulmonary embolus without acute cor pulmonale (HCC) I26.90    Septic embolism (HCC) I76    Elevated liver enzymes R74.8    Chronic hepatitis C without hepatic coma (HCC) B18.2    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) (HCC) B19.20, K74.69    Hepatic encephalopathy ([facility] [address] K72.90 Thank you for involving [facility]) Gastroenterology in the hospital care of Melani Wilcox. For further questions or concerns, we can best be reached through perfect serv.         Talha Jensen 12/28/20 10:25 AM EST

## 2020-12-28 NOTE — PROGRESS NOTES
Additional Pertinent Hx: Per H&P: \"patient is a pleasant 60 Y M with a h/o heroin IVDA, HCV, and HBV. He presents with about a week or worsening pain all over his body. The worse pain is a sharp, severe, stabbing sensation throughout his thorax when he breathes deeply. He has had subjective chills. WBC was 24 in the ED and CT showed diffuse septic pulmonary emboli. Pain is constant, better with opioids. \"  Family / Caregiver Present: No  Referring Practitioner: Jose Enrique Ni MD  Diagnosis: Acute Septic PE  Subjective  Subjective: Pt minimally agreeable to OT with encouragement  General Comment  Comments: RN cleared pt for therapy. Pain Assessment  Pain Assessment: Faces  Pain Level: 8  [place]roquin Pain Rating: Hurts whole lot  Pain Location: Back;Generalized  Non-Pharmaceutical Pain Intervention(s): Relaxation techniques; Environmental changes; Emotional support; Therapeutic presence;Elevation; Ambulation/Increased Activity  Response to Pain Intervention: Patient Satisfied  Vital Signs  Patient Currently in Pain: Yes   Orientation  Orientation  Overall Orientation Status: Within Functional Limits  Objective    ADL  Additional Comments: pt declined all ADLs     Balance  Sitting Balance: (pt refused all EOB and OOB activity)  Standing Balance: (pt refused all EOB and OOB activity)  Functional Mobility  Functional Mobility Comments: pt refused all EOB and OOB activity  Bed mobility  Supine to Sit: Unable to assess  Sit to Supine: Unable to assess  Comment: pt adamantly refusing mobility d/t pain      Cognition  Overall Cognitive Status: Exceptions  Arousal/Alertness: Appropriate responses to stimuli  Following Commands:  Follows one step commands with increased time  Attention Span: Appears intact  Memory: Appears intact Problem Solving: Decreased awareness of errors;Assistance required to generate solutions;Assistance required to correct errors made;Assistance required to identify errors made;Assistance required to implement solutions  Insights: Fully aware of deficits  Initiation: Requires cues for some  Sequencing: Requires cues for some      Type of ROM/Therapeutic Exercise  Type of ROM/Therapeutic Exercise: AROM  Comment: BUE AROM ther ex supine in bed  Exercises  Shoulder Flexion: x2 (significant pain and stiffness with movement)  Elbow Flexion: 10x  Elbow Extension: 10x  Supination: 10x  Pronation: 10x  Wrist Flexion: 10x  Wrist Extension: 10x  Finger Flexion: 10x  Finger Extension: 10x      Plan   Plan  Times per week: 3-5x  Current Treatment Recommendations: Strengthening, Patient/Caregiver Education & Training, Balance Training, Functional Mobility Training, Endurance Training, Self-Care / ADL    AM-PAC Score     AM-[facility] Inpatient Daily Activity Raw Score: 14 (12/28/20 1506)  AM-PAC Inpatient ADL T-Scale Score : 33.39 (12/28/20 1506)  ADL Inpatient CMS 0-100% Score: 59.67 (12/28/20 1506)  ADL Inpatient CMS G-Code Modifier : CK (12/28/20 1506)    Goals  Short term goals  Time Frame for Short term goals: 1/2/2021 unless otherwise stated  Short term goal 1: Pt will complete toilet transfers with min A x1 with AD as needed by 12/29/2020. Short term goal 2: Pt will complete 15 reps of BUE AROM for strength and endurance training. Short term goal 3: Pt will complete LB dressing with set-up and AE as needed.   Patient Goals   Patient goals : \"to be able to move around\"     Therapy Time   Individual Concurrent Group Co-treatment   Time In 1332         Time Out 1356         Minutes 24         Timed Code Treatment Minutes: 24 Minutes       Viraj Fatima OTR/L

## 2020-12-28 NOTE — PROGRESS NOTES
Hospitalist Progress Note      PCP: No primary care provider on file. Date of Admission: 2020    Chief Complaint: Pain all over, hurts to breathe    Subjective: no new c/o. Medications:  Reviewed    Infusion Medications    sodium chloride 100 mL/hr at 20 0503     Scheduled Medications    therapeutic multivitamin-minerals  1 tablet Oral Daily    lactulose  20 g Oral TID    vancomycin (VANCOCIN) intermittent dosing (placeholder)   Other RX Placeholder    sodium chloride flush  10 mL Intravenous 2 times per day    cefTRIAXone (ROCEPHIN) IV  2 g Intravenous Q24H    influenza virus vaccine  0.5 mL Intramuscular Prior to discharge    sodium chloride flush  10 mL Intravenous 2 times per day    enoxaparin  40 mg Subcutaneous Daily    nicotine  1 patch Transdermal Daily     PRN Meds: sodium chloride flush, perflutren lipid microspheres, oxyCODONE, sodium chloride flush, acetaminophen **OR** acetaminophen, prochlorperazine, diphenhydrAMINE, [] buprenorphine **AND** cloNIDine, traZODone      Intake/Output Summary (Last 24 hours) at 2020 1025  Last data filed at 2020 0945  Gross per 24 hour   Intake 490 ml   Output 1100 ml   Net -610 ml       Physical Exam Performed:    /82   Pulse 94   Temp 99 °F (37.2 °C) (Oral)   Resp 28   Ht 5' 10\" (1.778 m)   Wt 145 lb (65.8 kg)   SpO2 95%   BMI 20.81 kg/m²     General appearance: No apparent distress, appears stated age and cooperative. HEENT: Pupils equal, round, and reactive to light. Conjunctivae/corneas clear. Neck: Supple, with full range of motion. No jugular venous distention. Trachea midline. Respiratory:  Normal respiratory effort. Clear to auscultation, bilaterally without Rales/Wheezes/Rhonchi. Cardiovascular: Regular rate and rhythm with normal S1/S2 without murmurs, rubs or gallops. Abdomen: Soft, non-tender, non-distended with normal bowel sounds. Musculoskeletal: No clubbing, cyanosis or edema bilaterally. Full range of motion without deformity. Skin: Skin color, texture, turgor normal.  No rashes or lesions. Neurologic:  Neurovascularly intact without any focal sensory/motor deficits. Cranial nerves: II-XII intact, grossly non-focal.  Psychiatric: Alert and oriented, thought content appropriate, normal insight  Capillary Refill: Brisk,< 3 seconds   Peripheral Pulses: +2 palpable, equal bilaterally       Labs:   Recent Labs     12/26/20  0837 12/27/20  0440 12/28/20  0645   WBC 21.9* 21.2* 21.1*   HGB 10.2* 9.1* 8.9*   HCT 30.6* 27.2* 26.0*   PLT 73* 92* 138     Recent Labs     12/26/20  0837 12/27/20  0440 12/28/20  0645   * 129* 128*   K 4.2 4.3 4.1   CL 99 102 103   CO2 20* 19* 17*   BUN 35* 41* 39*   CREATININE 0.8* 1.0 1.1   CALCIUM 7.8* 7.6* 7.7*   PHOS  --  3.5 3.5     Recent Labs     12/26/20  0837 12/27/20  0440 12/28/20  0645   * 111* 82*   ALT 58* 50* 43*   BILIDIR  --  3.7* 2.8*   BILITOT 6.2* 4.7* 3.9*   ALKPHOS 159* 162* 145*     Recent Labs     12/27/20  0440 12/28/20  0645   INR 2.02* 1.88*     No results for input(s): CKTOTAL, TROPONINI in the last 72 hours.     Urinalysis:      Lab Results   Component Value Date    NITRU POSITIVE 12/23/2020    WBCUA 21-50 12/23/2020    BACTERIA 3+ 12/23/2020    RBCUA 3-4 12/23/2020    BLOODU SMALL 12/23/2020    SPECGRAV 1.015 12/23/2020    GLUCOSEU Negative 12/23/2020       Consults:    IP CONSULT TO HOSPITALIST  IP CONSULT TO PHARMACY  IP CONSULT TO INFECTIOUS DISEASES  IP CONSULT TO GI      Assessment/Plan:    Active Hospital Problems    Diagnosis    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) ([facility] [B19.20, K74.69]    Hepatic encephalopathy ([facility] [K72.90]    Septic embolism ([facility] Zarina Holcomb    Elevated liver enzymes [R74.8]    Chronic hepatitis C without hepatic coma (HCC) [B18.2]    Acute septic pulmonary embolus without acute cor pulmonale (HCC) [I26.90]            Septic pulmonary emboli due to  R-sided endocarditis   - suspect staph.  Vancomycin started 12/23.    -echo done 12/24, ef 55%, no obvious wm abn, large mobile veg on tricuspid valve, mild tr  -ID consulted, apprec recs   -picc placed 12/26     Acute cystitis  - potentially bacteremic spread.  Ceftriaxone for now to cover gram negatives.  F/u urine culture.      Sepsis, present on arrival, due to above  - IVFs, trended lacate  - hyperbilirubinemia and thrombocytopenia are thought to be manifestations of the sepsis.  Monitored.      Back pain  - seems more pleuritic than spinal.  He is at risk for discitis or epidural abscess.  If symptoms continue, consider MRI with and without contrast.  He does not have any symptoms of cord compression, and he will probably already be committed to 6 weeks of IV abx.     Hypovolemic hyponatremia  - will presumably improve with IVFs.    Demand ischemia  -Trended troponins, downtrending.     Gaxmdbssljcdd-fsftuodjyteuizptmc-wyzlmmuqz of late, MELD NA score of 26  -Gi consulted, apprec mgmt, per note pt has chronic hep c and b  -Hep b/hep c labs ordered   -MRI abd w/o biliary obstruction  -consider transfer to tertiary care center if not improving  -Hep A pending     Hepatic encephalopathy - per GI note, (Stage 1) in setting of acute infective endocarditis   -Started lactulose TID(titrate to 2-3 soft bowel movements daily)        DVT Prophylaxis: LMWH  Diet: DIET LOW SODIUM 2 GM; 1500 ml  Code Status: Full Code      PT/OT Eval Status: seen w/ recs for SNF     Dispo - Here for the foreseeable future.      Le Stahl MD

## 2020-12-28 NOTE — CARE COORDINATION
CM received call back from [facility] admissions staff, [facility]. States she \"never received referral\" - despite this CM speaking with her on 12/24. CM verified correct fax number for centralized admissions. Will refax documents for their review.

## 2020-12-28 NOTE — PROGRESS NOTES
Physical Therapy  Facility/Department: [facility] B3 - MED SURG  Daily Treatment Note  NAME: Keller Barthel  : 1965  MRN: [id number]    Date of Service: 2020    Discharge Recommendations:  Subacute/Skilled Nursing Facility   PT Equipment Recommendations  Equipment Needed: No  Other: defer to patient's facility. Assessment    Body structures, Functions, Activity limitations: Decreased functional mobility ; Decreased ADL status; Decreased balance;Decreased strength;Decreased endurance;Decreased high-level IADLs;Decreased posture; Increased pain;Decreased sensation;Decreased safe awareness;Decreased cognition  Assessment: Patient seen for LE strengthening. Patient cleared by RN for therapy participation this date. Patient agreeable to therapy. Patient completed exercises in supine with encouragement and adamantly declined mobility d/t generalized pain. Pt demos increased SOB with VSS throughout. P.T .will continue to follow throughout LOS. Recommending DC to SNF. Treatment Diagnosis: decreased independence with functional mobility. Specific instructions for Next Treatment: progress mobility as tolerated  Prognosis: Fair  Decision Making: Medium Complexity  PT Education: Goals; General Safety;Gait Training;PT Role;Disease Specific Education;Plan of Care; Functional Mobility Training;Home Exercise Program;Pressure Relief;Precautions; Injury Prevention;Transfer Training  Patient Education: pt educated on benefits of sitting EOB, OOB mobility, and completing LE exercises  Barriers to Learning: impaired cognition. REQUIRES PT FOLLOW UP: Yes  Activity Tolerance  Activity Tolerance: Patient limited by fatigue;Patient limited by pain; Patient limited by endurance  Activity Tolerance: SPO2 94% on RA, HR 90's. Pt demos increased SOB with exercises, cued for PLB     Patient Diagnosis(es): The encounter diagnosis was Septic embolism ([facility] has a past medical history of Back pain, Hepatitis B surface antigen positive, Hepatitis C antibody positive in blood, and MRSA (methicillin resistant staph aureus) culture positive. has a past surgical history that includes Wrist surgery and Kidney removal (Right). Restrictions  Restrictions/Precautions  Restrictions/Precautions: Up as Tolerated, Fall Risk  Position Activity Restriction  Other position/activity restrictions: log rol technique  Subjective   General  Chart Reviewed: Yes  Response To Previous Treatment: Patient with no complaints from previous session.   Family / Caregiver Present: No  Referring Practitioner: Alyson Back  Subjective  Subjective: pt in bed, refusing mobility but agreeable to LE exercises in supine with encouragement  Pain Screening  Patient Currently in Pain: Yes  Pain Assessment  Pain Assessment: Faces  Holland-Baker Pain Rating: Hurts whole lot  Pain Type: Acute pain  Pain Location: Back;Generalized  Pain Descriptors: Discomfort  Non-Pharmaceutical Pain Intervention(s): Elevation;Repositioned  Vital Signs  Patient Currently in Pain: Yes       Orientation  Orientation  Overall Orientation Status: Within Functional Limits     Objective   Bed mobility  Supine to Sit: Unable to assess  Sit to Supine: Unable to assess  Comment: pt adamantly refusing mobility d/t pain  Transfers  Sit to Stand: Unable to assess  Stand to sit: Unable to assess  Bed to Chair: Unable to assess  Comment: adamantly refusing mobility d/t pain  Ambulation  Ambulation?: No  Stairs/Curb  Stairs?: No        Exercises  Quad Sets: 1x 15 BLE  Heelslides: 1x 15 BLE  Hip Abduction: 1x 10 BLE  Ankle Pumps: 1x 20 BLE                     AM-PAC Score     AM-PAC Inpatient Mobility without Stair Climbing Raw Score : 9 (12/28/20 1403)  AM-PAC Inpatient without Stair Climbing T-Scale Score : 32.44 (12/28/20 1403)  Mobility Inpatient CMS 0-100% Score: 76.07 (12/28/20 1403) Mobility Inpatient without Stair CMS G-Code Modifier : CL (12/28/20 1403)       Goals  Short term goals  Time Frame for Short term goals: 1 week 1/2/21  Short term goal 1: Rolling left/right with minimum assistance. Short term goal 2: Supine <> sit with minimum assistance. Short term goal 3: Sit <> stand with min assist.  Short term goal 4: Bed <> chair with LRAD and min assist.  Short term goal 5: Patient to ambulate 50 feet with LRAD and min assist.  Patient Goals   Patient goals : To feel better. Plan    Plan  Times per week: 3-5/week  Plan weeks: 1 week 1/2/21  Specific instructions for Next Treatment: progress mobility as tolerated  Current Treatment Recommendations: Strengthening, Home Exercise Program, ROM, Safety Education & Training, Balance Training, Endurance Training, Patient/Caregiver Education & Training, Functional Mobility Training, Equipment Evaluation, Education, & procurement, Transfer Training, Gait Training, ADL/Self-care Training, Positioning, Pain Management  Safety Devices  Type of devices: All fall risk precautions in place, Nurse notified, Patient at risk for falls, Left in bed, Bed alarm in place, Call light within reach  Restraints  Initially in place: No     Therapy Time   Individual Concurrent Group Co-treatment   Time In 1332         Time Out 1356         Minutes 24         Timed Code Treatment Minutes: 24 Minutes     If pt is unable to be seen after this session, please let this note serve as discharge summary. Please see case management note for discharge disposition. Thank you.     Judith Salgado, PT

## 2020-12-28 NOTE — CARE COORDINATION
Chart reviewed by . Triggered by LOS. Day #5    Pt on B3. Being followed by internal medicine, GI and infectious disease. Referrals to [facility] and [facility] on 12/24 for placement for long term IV antibiotics. Per Monserrat CRANDALL, note on 12/24, [facility] accepted, IF subutex discontinued - pt has been refusing this medication. No answer Jacklyn Carlson.  called admissions staff, Raudel Galvan. No answer.  left for call back. Per internal medicine note from yesterday, disposition is still \"pending workup - guarded prognosis\". CM team will continue to follow.     Cordell Antonio RN

## 2020-12-29 NOTE — PROGRESS NOTES
Physical Therapy  Facility/Department: [facility] B3 - MED SURG  Daily Treatment Note  NAME: Domenic Rico  : 1965  MRN: [id number]    Date of Service: 2020    Discharge Recommendations:  Subacute/Skilled Nursing Facility   PT Equipment Recommendations  Equipment Needed: No  Other: defer to patient's facility. Assessment    Body structures, Functions, Activity limitations: Decreased functional mobility ; Decreased ADL status; Decreased balance;Decreased strength;Decreased endurance;Decreased high-level IADLs;Decreased posture; Increased pain;Decreased sensation;Decreased safe awareness;Decreased cognition  Assessment: Patient seen for bed mobility, sitting balance, and LE strengthening. Patient cleared by RN for therapy participation this date. Patient agreeable to therapy. Patient completed bed mobility with max A x2, sat EOB with min A-CGA, and returned to bed d/t dizziness and lethargy. VSS. RN notified. P.T .will continue to follow throughout LOS. Recommending DC to SNF. Treatment Diagnosis: decreased independence with functional mobility. Specific instructions for Next Treatment: progress mobility as tolerated  Prognosis: Fair  Decision Making: Medium Complexity  PT Education: Goals; General Safety;Gait Training;PT Role;Disease Specific Education;Plan of Care; Functional Mobility Training;Home Exercise Program;Pressure Relief;Precautions; Injury Prevention;Transfer Training  Patient Education: pt educated on benefits of sitting EOB, OOB mobility, and completing LE exercises  Barriers to Learning: impaired cognition. REQUIRES PT FOLLOW UP: Yes  Activity Tolerance  Activity Tolerance: Patient limited by fatigue;Patient limited by pain; Patient limited by endurance  Activity Tolerance: SPO2 92% on RA with  bpm and /75 in supine. SPO2 94%,  bpm, and /87 sitting at EOB. Pt demos SOB and lethargy, RN notified. Patient Diagnosis(es): The encounter diagnosis was Septic embolism ([facility] has a past medical history of Back pain, Hepatitis B surface antigen positive, Hepatitis C antibody positive in blood, and MRSA (methicillin resistant staph aureus) culture positive. has a past surgical history that includes Wrist surgery and Kidney removal (Right). Restrictions  Restrictions/Precautions  Restrictions/Precautions: Up as Tolerated, Fall Risk  Position Activity Restriction  Other position/activity restrictions: log roll technique  Subjective   General  Chart Reviewed: Yes  Response To Previous Treatment: Patient with no complaints from previous session.   Family / Caregiver Present: No  Referring Practitioner: Nehemias Archer  Subjective  Subjective: pt in bed, lethargic but cooperative  Pain Screening  Patient Currently in Pain: Denies  Vital Signs  Patient Currently in Pain: Denies       Orientation  Orientation  Overall Orientation Status: Within Functional Limits  Objective   Bed mobility  Supine to Sit: Maximum assistance;2 Person assistance(max Ax2)  Sit to Supine: 2 Person assistance;Minimal assistance(min Ax2)  Scooting: Maximal assistance;2 Person assistance(max A X2 to EOB)  Transfers  Sit to Stand: Unable to assess  Stand to sit: Unable to assess  Bed to Chair: Unable to assess  Comment: Pt sat EOB 5 mins with CGA-min A for sitting balance d/t fatigue and weakness  Ambulation  Ambulation?: No        Exercises  Heelslides: 1x 5 BLE  Ankle Pumps: 1x 20 BLE                     AM-PAC Score     AM-PAC Inpatient Mobility without Stair Climbing Raw Score : 9 (12/29/20 1600)  AM-PAC Inpatient without Stair Climbing T-Scale Score : 32.44 (12/29/20 1600)  Mobility Inpatient CMS 0-100% Score: 76.07 (12/29/20 1600)  Mobility Inpatient without Stair CMS G-Code Modifier : CL (12/29/20 1600)       Goals  Short term goals  Time Frame for Short term goals: 1 week 1/2/21 Short term goal 1: Rolling left/right with minimum assistance. Short term goal 2: Supine <> sit with minimum assistance. Short term goal 3: Sit <> stand with min assist.  Short term goal 4: Bed <> chair with LRAD and min assist.  Short term goal 5: Patient to ambulate 50 feet with LRAD and min assist.  Patient Goals   Patient goals : To feel better. Plan    Plan  Times per week: 3-5/week  Plan weeks: 1 week 1/2/21  Specific instructions for Next Treatment: progress mobility as tolerated  Current Treatment Recommendations: Strengthening, Home Exercise Program, ROM, Safety Education & Training, Balance Training, Endurance Training, Patient/Caregiver Education & Training, Functional Mobility Training, Equipment Evaluation, Education, & procurement, Transfer Training, Gait Training, ADL/Self-care Training, Positioning, Pain Management  Safety Devices  Type of devices: All fall risk precautions in place, Nurse notified, Patient at risk for falls, Left in bed, Bed alarm in place, Call light within reach  Restraints  Initially in place: No     Therapy Time   Individual Concurrent Group Co-treatment   Time In 1252         Time Out 1319         Minutes 27         Timed Code Treatment Minutes: 27 Minutes     If pt is unable to be seen after this session, please let this note serve as discharge summary. Please see case management note for discharge disposition. Thank you.     Reina Carney, PT

## 2020-12-29 NOTE — PROGRESS NOTES
ID Follow-up NOTE    CC:   MRSA bacteremia / endocarditis   Antibiotics: Vancomycin, Ceftriaxone     Admit Date: 12/23/2020  Hospital Day: 7    Subjective:     Patient c/o back and chest pain, no change   No further opiate w/d sx      Objective:     Afebrile     Patient Vitals for the past 8 hrs:   BP Temp Temp src Pulse Resp SpO2   12/29/20 1107 (!) 149/84 97.7 °F (36.5 °C) Oral 84 22 95 %   12/29/20 0910 (!) 156/83 99 °F (37.2 °C) Oral 92  93 %     I/O last 3 completed shifts: In: 2100 [P.O.:840; I.V.:1010; IV Piggyback:250]  Out: 1525 [JZIPK:2766]  I/O this shift: In: 835 [P.O.:835]  Out: 675 [Urine:675]    EXAM:  GENERAL: No apparent distress.   RA  HEENT: Membranes moist, no oral lesion  NECK:  Supple, no lymphadenopathy  LUNGS: Clear b/l, no rales, no dullness  CARDIAC: RRR, no murmur appreciated  ABD:  + BS, soft / NT  EXT:  No rash, no edema, no lesions  NEURO: No focal neurologic findings  PSYCH: Orientation, sensorium, mood normal  LINES:  R PICC in place (placed 12/27)       Data Review:  Lab Results   Component Value Date    WBC 24.1 (H) 12/29/2020    HGB 8.3 (L) 12/29/2020    HCT 24.6 (L) 12/29/2020    MCV 89.4 12/29/2020     12/29/2020     Lab Results   Component Value Date    CREATININE 1.0 12/29/2020    BUN 41 (H) 12/29/2020     (L) 12/29/2020    K 4.4 12/29/2020     12/29/2020    CO2 17 (L) 12/29/2020       Hepatic Function Panel:   Lab Results   Component Value Date    ALKPHOS 153 12/29/2020    ALT 37 12/29/2020    AST 64 12/29/2020    PROT 7.1 12/29/2020    PROT 7.3 02/07/2013    BILITOT 2.3 12/29/2020    BILIDIR 1.5 12/29/2020    IBILI 0.8 12/29/2020    LABALBU 1.7 12/29/2020       MICRO:  12/25 BC MRSA  Staph aureus mrsa   Antibiotic Interpretation MACKENZIE   clindamycin Sensitive <=0.25 mcg/mL   erythromycin Resistant >=8 mcg/mL   oxacillin Resistant >=4 mcg/mL   tetracycline Sensitive <=1 mcg/mL   trimethoprim-sulfamethoxazole Sensitive <=10 mcg/mL vancomycin Sensitive 1 mcg/mL       IMAGIN/26 Abd MRI:  Suboptimal post-contrast imaging of the abdomen, as gadolinium contrast   material had already been administered for lumbar spine MRI. 1. No biliary obstruction. 2. Faintly nodular contour of the liver.  Recommend clinical correlation for   diffuse liver disease. 3. New small volume ascites. 4. Mild splenomegaly of uncertain etiology. +     Lumbar MRI w/wo contrast:  1. Minimal edema is seen between the L4-L5 and L5-S1 spinous processes with   edema in the posterior paraspinal musculature spanning the L4 through S1   levels.  Unclear whether this is related to a prior injury or perhaps related   to early Baastrup's disease. 2. Degenerative changes contribute to neural foraminal narrowing at L4-L5 and   L5-S1.   3. No significant spinal canal stenosis.  Chest CT / CTA  Findings most compatible with diffuse septic emboli.  Superimposed bilateral   lower lobe airspace opacities. No pulmonary arterial filling defect. Multiple prominent mediastinal lymph nodes, likely reactive. +    ECHO:   TTE: Summary   Technically difficult examination due to patient immobility. Normal LV systolic function with a visually estimated EF of 55%. Endocardium not entirely well visualized but no obvious segmental wall   motion abnormalities. Normal left ventricular diastolic filling pressure. There is a large mobile vegetation attached to the tricuspid valve,   primarily on the atrial portion but appearing to swing into the right   ventricle. It measures approximately 1.3 by 1.1 cm. Mild tricuspid regurgitation. Systolic pulmonary artery pressure (SPAP) is normal and estimated at 27 mmHg   (right atrial pressure 3 mmHg).     Scheduled Meds:   vancomycin  1,000 mg Intravenous Q12H    therapeutic multivitamin-minerals  1 tablet Oral Daily    lactulose  20 g Oral TID  vancomycin (VANCOCIN) intermittent dosing (placeholder)   Other RX Placeholder    sodium chloride flush  10 mL Intravenous 2 times per day    cefTRIAXone (ROCEPHIN) IV  2 g Intravenous Q24H    influenza virus vaccine  0.5 mL Intramuscular Prior to discharge    sodium chloride flush  10 mL Intravenous 2 times per day    enoxaparin  40 mg Subcutaneous Daily    nicotine  1 patch Transdermal Daily       Continuous Infusions:   sodium chloride 100 mL/hr at 20 0208       PRN Meds:  sodium chloride flush, perflutren lipid microspheres, oxyCODONE, sodium chloride flush, acetaminophen **OR** acetaminophen, prochlorperazine, diphenhydrAMINE, [] buprenorphine **AND** cloNIDine, traZODone      Assessment:     IVDU, HCV with liver d  TV endocarditis   MRSA bacteremia   Pulmonary septic emboli  Leukocytosis   Abnormal LFT - labs improving    Plan:     Cont vancomycin  D/c ceftriaxone  Will order f/u [facility]    See ERLIN  Will need treatment with iv antibiotics in a monitored setting    Discussed with pt, Attending - Dr Chandra Bread:  Vancomycin 1 gm iv q 12 through 21  - First dose given in hospital  - PICC   - Disposition / date discharge  - Check CBC w diff, CMP, ESR, CRP, vancomycin trough every Mon or Tue - FAX result to [phone]  - Call with antibiotic / infusion issues, [phone]  - Call with any change in status, transfer out of facility or to hospital - [phone]  - No f/u in outpatient ID office necessary

## 2020-12-29 NOTE — PROGRESS NOTES
Hospitalist Progress Note      PCP: No primary care provider on file. Date of Admission: 2020    Chief Complaint: Pain all over, hurts to breathe    Subjective: no new c/o. Medications:  Reviewed    Infusion Medications    sodium chloride 100 mL/hr at 20 0208     Scheduled Medications    vancomycin  1,000 mg Intravenous Q12H    therapeutic multivitamin-minerals  1 tablet Oral Daily    lactulose  20 g Oral TID    vancomycin (VANCOCIN) intermittent dosing (placeholder)   Other RX Placeholder    sodium chloride flush  10 mL Intravenous 2 times per day    cefTRIAXone (ROCEPHIN) IV  2 g Intravenous Q24H    influenza virus vaccine  0.5 mL Intramuscular Prior to discharge    sodium chloride flush  10 mL Intravenous 2 times per day    enoxaparin  40 mg Subcutaneous Daily    nicotine  1 patch Transdermal Daily     PRN Meds: sodium chloride flush, perflutren lipid microspheres, oxyCODONE, sodium chloride flush, acetaminophen **OR** acetaminophen, prochlorperazine, diphenhydrAMINE, [] buprenorphine **AND** cloNIDine, traZODone      Intake/Output Summary (Last 24 hours) at 2020 0832  Last data filed at 2020 9119  Gross per 24 hour   Intake 2100 ml   Output 1525 ml   Net 575 ml       Physical Exam Performed:    /83   Pulse 98   Temp 97.8 °F (36.6 °C) (Oral)   Resp 18   Ht 5' 10\" (1.778 m)   Wt 145 lb (65.8 kg)   SpO2 91%   BMI 20.81 kg/m²     General appearance: No apparent distress, appears stated age and cooperative. HEENT: Pupils equal, round, and reactive to light. Conjunctivae/corneas clear. Neck: Supple, with full range of motion. No jugular venous distention. Trachea midline. Respiratory:  Normal respiratory effort. Clear to auscultation, bilaterally without Rales/Wheezes/Rhonchi. Cardiovascular: Regular rate and rhythm with normal S1/S2 without murmurs, rubs or gallops. Abdomen: Soft, non-tender, non-distended with normal bowel sounds. Musculoskeletal: No clubbing, cyanosis or edema bilaterally. Full range of motion without deformity. Skin: Skin color, texture, turgor normal.  No rashes or lesions. Neurologic:  Neurovascularly intact without any focal sensory/motor deficits. Cranial nerves: II-XII intact, grossly non-focal.  Psychiatric: Alert and oriented, thought content appropriate, normal insight  Capillary Refill: Brisk,< 3 seconds   Peripheral Pulses: +2 palpable, equal bilaterally       Labs:   Recent Labs     12/27/20  0440 12/28/20  0645 12/29/20  0600   WBC 21.2* 21.1* 24.1*   HGB 9.1* 8.9* 8.3*   HCT 27.2* 26.0* 24.6*   PLT 92* 138 175     Recent Labs     12/27/20 0440 12/28/20  0645 12/29/20  0600   * 128* 131*   K 4.3 4.1 4.4    103 110   CO2 19* 17* 17*   BUN 41* 39* 41*   CREATININE 1.0 1.1 1.0   CALCIUM 7.6* 7.7* 7.7*   PHOS 3.5 3.5 2.9     Recent Labs     12/27/20  0440 12/28/20  0645 12/29/20  0600   * 82* 64*   ALT 50* 43* 37   BILIDIR 3.7* 2.8* 1.5*   BILITOT 4.7* 3.9* 2.3*   ALKPHOS 162* 145* 153*     Recent Labs     12/27/20 0440 12/28/20  0645 12/29/20  0600   INR 2.02* 1.88* 1.70*     No results for input(s): CKTOTAL, TROPONINI in the last 72 hours.     Urinalysis:      Lab Results   Component Value Date    NITRU POSITIVE 12/23/2020    WBCUA 21-50 12/23/2020    BACTERIA 3+ 12/23/2020    RBCUA 3-4 12/23/2020    BLOODU SMALL 12/23/2020    SPECGRAV 1.015 12/23/2020    GLUCOSEU Negative 12/23/2020       Consults:    IP CONSULT TO HOSPITALIST  IP CONSULT TO PHARMACY  IP CONSULT TO INFECTIOUS DISEASES  IP CONSULT TO GI      Assessment/Plan:    Active Hospital Problems    Diagnosis    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) ([facility] [address] [B19.20, K74.69]    Hepatic encephalopathy ([facility] [address] [K72.90]    [facility] ([facility] Yoandy Clay    Elevated liver enzymes [R74.8]    Chronic hepatitis C without hepatic coma ([facility] [B18.2]  Acute septic pulmonary embolus without acute cor pulmonale (HCC) [I26.90]        Septic pulmonary emboli due to  R-sided endocarditis   - suspect staph.  Vancomycin started 12/23.   - Echo done 12/24, ef 55%, no obvious wm abn, large mobile veg on tricuspid valve, mild tr  - Infectious Disease consulted and appreciated. - PICC placed 26 Dec     Acute cystitis - potentially bacteremic spread.  Ceftriaxone for now to cover gram negatives.  F/u urine culture.      Sepsis - present on arrival, due to above  - IVFs, trended lacate  - hyperbilirubinemia and thrombocytopenia are thought to be manifestations of the sepsis.  Monitored.      Back pain - seems more pleuritic than spinal.  He is at risk for discitis or epidural abscess.  If symptoms continue, consider MRI with and without contrast.  He does not have any symptoms of cord compression, and he will probably already be committed to 6 weeks of IV abx.     HypoNatremia - etiology clinically unable to determine but likely hypovolemic. Follow serial labs on IVF. Reviewed and documented as above. Troponin elevation - of unclear clinical significance w/ etiology clinically unable to determine, w/out signs/sxs of active ischemia. Followed serial troponins - downtrended. Reviewed and documented as above.     Transaminitis - hyperbilirubinemia-worsening of late, MELD NA score of 26  - GI consulted and appreciated w/ hx of chronic HBV/HCV   - MRI abdomen w/o biliary obstruction     Hepatic encephalopathy - per GI note, (Stage 1) in setting of acute infective endocarditis   -Started lactulose TID(titrate to 2-3 soft bowel movements daily)     Anemia - etiology clinically unable to determine, w/out evidence of active bleeding/hemolysis. Asymptomatic w/out indication for transfusion. Follow serial labs.   Reviewed and documented as above.       DVT Prophylaxis: LMWH  Diet: DIET LOW SODIUM 2 GM; 1500 ml  Code Status: Full Code PT/OT Eval Status: seen w/ recs for SNF     Dispo - Here for the foreseeable future.      Ines Calderon MD

## 2020-12-29 NOTE — PROGRESS NOTES
Occupational Therapy  Facility/Department: [facility] B3 - MED SURG  Daily Treatment Note  NAME: Alonzo Fleming  : 1965  MRN: [id number]    Date of Service: 2020    Discharge Recommendations:  Subacute/Skilled Nursing Facility  OT Equipment Recommendations  Other: defer to facility    Assessment   Performance deficits / Impairments: Decreased functional mobility ; Decreased endurance;Decreased ADL status; Decreased high-level IADLs;Decreased balance  Assessment: Patient very lethargic during therapy and nursing approved therapy and stated he had pain meds prior to therapy. Patient needing max cues and increased assist to sit at the EOB with Max Ax2. Patient sat at EOB 5 min with min/CGA. Pt needed assist to feed self initially, then setup to drink. He performed AAROM B UEs in supine. Continue with POC  Treatment Diagnosis: Impaired self care and txfr status. Prognosis: Fair  OT Education: OT Role;Plan of Care;Precautions; Energy Conservation;Home Exercise Program  Patient Education: Disease specific edu: Role of OT,  Barriers to Learning: lethargy  REQUIRES OT FOLLOW UP: Yes  Activity Tolerance  Activity Tolerance: Patient limited by fatigue  Safety Devices  Type of devices: Left in bed;Bed alarm in place;Nurse notified;Call light within reach         Patient Diagnosis(es): The encounter diagnosis was Septic embolism ([facility] [address] has a past medical history of Back pain, Hepatitis B surface antigen positive, Hepatitis C antibody positive in blood, and MRSA (methicillin resistant staph aureus) culture positive. has a past surgical history that includes Wrist surgery and Kidney removal (Right).     Restrictions  Restrictions/Precautions  Restrictions/Precautions: Up as Tolerated, Fall Risk  Position Activity Restriction  Other position/activity restrictions: log roll technique  Subjective   General  Chart Reviewed: Yes  Patient assessed for rehabilitation services?: Yes Additional Pertinent Hx: Per H&P: \"patient is a pleasant 60 Y M with a h/o heroin IVDA, HCV, and HBV. He presents with about a week or worsening pain all over his body. The worse pain is a sharp, severe, stabbing sensation throughout his thorax when he breathes deeply. He has had subjective chills. WBC was 24 in the ED and CT showed diffuse septic pulmonary emboli. Pain is constant, better with opioids. \"  Response to previous treatment: Patient with no complaints from previous session  Family / Caregiver Present: No  Referring Practitioner: Karen Domínguez MD  Diagnosis: Acute Septic PE  Subjective  Subjective: Pt minimally agreeable to OT with encouragement  General Comment  Comments: RN cleared pt for therapy.   Vital Signs  Patient Currently in Pain: Denies   Orientation  Orientation  Overall Orientation Status: Within Functional Limits  Objective    ADL  Feeding: Minimal assistance(lethargic, needing extra help initially, then able to drink after setup)  LE Dressing: Dependent/Total        Standing Balance  Time: unable to tolerate due to fatigue  Functional Mobility  Functional Mobility Comments: unable due to fatigue  Bed mobility  Supine to Sit: Maximum assistance;2 Person assistance  Sit to Supine: Minimal assistance;2 Person assistance  Scooting: Maximal assistance;2 Person assistance  Comment: agreeable to EOB  Transfers  Transfer Comments: unable to tolerate due to fatigue            Cognition  Overall Cognitive Status: Exceptions  Arousal/Alertness: Delayed responses to stimuli  Following Commands: Inconsistently follows commands  Initiation: Requires cues for some  Sequencing: Requires cues for some         Type of ROM/Therapeutic Exercise  Type of ROM/Therapeutic Exercise: AAROM  Comment: B RATNA AAROM x5 reps due to fatigue            Plan   Plan  Times per week: 3-5x Current Treatment Recommendations: Strengthening, Patient/Caregiver Education & Training, Balance Training, Functional Mobility Training, Endurance Training, Self-Care / ADL    AM-PAC Score        AM-PAC Inpatient Daily Activity Raw Score: 14 (12/29/20 1502)  AM-PAC Inpatient ADL T-Scale Score : 33.39 (12/29/20 1502)  ADL Inpatient CMS 0-100% Score: 59.67 (12/29/20 1502)  ADL Inpatient CMS G-Code Modifier : CK (12/29/20 1502)    Goals  Short term goals  Time Frame for Short term goals: 1/2/2021 unless otherwise stated  Short term goal 1: Pt will complete toilet transfers with min A x1 with AD as needed by 12/29/2020. Short term goal 2: Pt will complete 15 reps of BUE AROM for strength and endurance training., ongoing 12/29  Short term goal 3: Pt will complete LB dressing with set-up and AE as needed. ,  Patient Goals   Patient goals : \"to be able to move around\"       Therapy Time   Individual Concurrent Group Co-treatment   Time In 1252         Time Out 1319         Minutes 27         Timed Code Treatment Minutes: 27 Minutes     If pt discharges prior to next session, this note will serve as discharge summary. See case management note for discharge disposition.      Ivet Aleman OT

## 2020-12-30 NOTE — PROGRESS NOTES
Comprehensive Nutrition Assessment    Type and Reason for Visit:  Initial, RD Nutrition Re-Screen/LOS    Nutrition Recommendations/Plan:   1. Continue 2 gm sodium diet   2. Add Ensure and Magic Cups BID   3. Encourage PO intakes   4. Monitor nutrition adequacy, pertinent labs, bowel habits, wt changes, and clinical progress    Nutrition Assessment:  Pt admitted with septic pulmonary embolism 2/2 endocarditits. Hx of IVDA, Hep B and C. Pt nutritionally compromised AEB poor appetite. Pt c/o pain this date likely decreasing appetite. Pt reports fluids are easier to consume, favorable to trial ONS for optimal nutrition. Encouraged PO intakes as able. Will continue to monitor. Malnutrition Assessment:  Malnutrition Status: At risk for malnutrition (Comment)      Estimated Daily Nutrient Needs:  Energy (kcal):  3456-6104 kcal; Weight Used for Energy Requirements:  Current(66 kg)     Protein (g):  66-80 g; Weight Used for Protein Requirements:  Current(1.0-1.2 g/kg)        Fluid (ml/day):   ; Method Used for Fluid Requirements:  1 ml/kcal      Nutrition Related Findings:  Jaundice, BM 12/29, +2 pitting BLE edema      Wounds:  None       Current Nutrition Therapies:    DIET LOW SODIUM 2 GM; Anthropometric Measures:  · Height: 5' 10\" (177.8 cm)  · Current Body Weight: 145 lb (65.8 kg)     · Ideal Body Weight: 166 lbs; % Ideal Body Weight 87.3 %   · BMI: 20.8  · BMI Categories: Normal Weight (BMI 18.5-24. 9)       Nutrition Diagnosis:   · Inadequate oral intake related to pain, inadequate protein-energy intake as evidenced by intake 0-25%      Nutrition Interventions:   Food and/or Nutrient Delivery:  Continue Current Diet, Start Oral Nutrition Supplement  Nutrition Education/Counseling:  No recommendation at this time   Coordination of Nutrition Care:  Continue to monitor while inpatient    Goals:  Pt will consume greater than 50% of meals and ONS this admission       Nutrition Monitoring and Evaluation: Food/Nutrient Intake Outcomes:  Food and Nutrient Intake, Supplement Intake  Physical Signs/Symptoms Outcomes:  Biochemical Data, Weight     Discharge Planning:    Continue current diet, Continue Oral Nutrition Supplement     Electronically signed by Gisselle Lynch MS, RD, LD on 12/30/20 at 12:30 PM EST    Contact: [phone]

## 2020-12-30 NOTE — PROGRESS NOTES
Hospitalist Progress Note      PCP: No primary care provider on file. Date of Admission: 2020    Chief Complaint: Pain all over, hurts to breathe    Subjective: no new c/o. Medications:  Reviewed    Infusion Medications     Scheduled Medications    vancomycin  1,000 mg Intravenous Q12H    therapeutic multivitamin-minerals  1 tablet Oral Daily    lactulose  20 g Oral TID    vancomycin (VANCOCIN) intermittent dosing (placeholder)   Other RX Placeholder    sodium chloride flush  10 mL Intravenous 2 times per day    cefTRIAXone (ROCEPHIN) IV  2 g Intravenous Q24H    influenza virus vaccine  0.5 mL Intramuscular Prior to discharge    sodium chloride flush  10 mL Intravenous 2 times per day    enoxaparin  40 mg Subcutaneous Daily    nicotine  1 patch Transdermal Daily     PRN Meds: sodium chloride flush, perflutren lipid microspheres, oxyCODONE, sodium chloride flush, acetaminophen **OR** acetaminophen, prochlorperazine, diphenhydrAMINE, [] buprenorphine **AND** cloNIDine, traZODone      Intake/Output Summary (Last 24 hours) at 2020 0915  Last data filed at 2020 0823  Gross per 24 hour   Intake 835 ml   Output 1301 ml   Net -466 ml       Physical Exam Performed:    BP (!) 147/82   Pulse 87   Temp 99.6 °F (37.6 °C)   Resp 20   Ht 5' 10\" (1.778 m)   Wt 145 lb (65.8 kg)   SpO2 93%   BMI 20.81 kg/m²     General appearance: No apparent distress, appears stated age and cooperative. HEENT: Pupils equal, round, and reactive to light. Conjunctivae/corneas clear. Neck: Supple, with full range of motion. No jugular venous distention. Trachea midline. Respiratory:  Normal respiratory effort. Clear to auscultation, bilaterally without Rales/Wheezes/Rhonchi. Cardiovascular: Regular rate and rhythm with normal S1/S2 without murmurs, rubs or gallops. Abdomen: Soft, non-tender, non-distended with normal bowel sounds. Musculoskeletal: No clubbing, cyanosis or edema bilaterally. Full range of motion without deformity. Skin: Skin color, texture, turgor normal.  No rashes or lesions. Neurologic:  Neurovascularly intact without any focal sensory/motor deficits. Cranial nerves: II-XII intact, grossly non-focal.  Psychiatric: Alert and oriented, thought content appropriate, normal insight  Capillary Refill: Brisk,< 3 seconds   Peripheral Pulses: +2 palpable, equal bilaterally       Labs:   Recent Labs     12/28/20  0645 12/29/20  0600 12/30/20  0500   WBC 21.1* 24.1* 20.8*   HGB 8.9* 8.3* 7.5*   HCT 26.0* 24.6* 22.2*    175 160     Recent Labs     12/28/20  0645 12/29/20  0600 12/30/20  0500   * 131* 132*   K 4.1 4.4 4.6    110 110   CO2 17* 17* 16*   BUN 39* 41* 41*   CREATININE 1.1 1.0 1.1   CALCIUM 7.7* 7.7* 7.9*   PHOS 3.5 2.9 3.7     Recent Labs     12/28/20  0645 12/29/20  0600 12/30/20  0500   AST 82* 64* 75*   ALT 43* 37 44*   BILIDIR 2.8* 1.5* 1.2*   BILITOT 3.9* 2.3* 2.2*   ALKPHOS 145* 153* 148*     Recent Labs     12/28/20  0645 12/29/20  0600 12/30/20  0500   INR 1.88* 1.70* 1.61*     No results for input(s): [place] Ripper in the last 72 hours.     Urinalysis:      Lab Results   Component Value Date    NITRU POSITIVE 12/23/2020    WBCUA 21-50 12/23/2020    BACTERIA 3+ 12/23/2020    RBCUA 3-4 12/23/2020    BLOODU SMALL 12/23/2020    SPECGRAV 1.015 12/23/2020    GLUCOSEU Negative 12/23/2020       Consults:    IP CONSULT TO HOSPITALIST  IP CONSULT TO PHARMACY  IP CONSULT TO INFECTIOUS DISEASES  IP CONSULT TO GI      Assessment/Plan:    Active Hospital Problems    Diagnosis    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) ([facility] [B19.20, K74.69]    Hepatic encephalopathy ([address] [K72.90]    Septic embolism ([facility] [address] Kelsi Chalet    Elevated liver enzymes [R74.8]    Chronic hepatitis C without hepatic coma (HCC) [B18.2]    Acute septic pulmonary embolus without acute cor pulmonale (HCC) [I26.90]    Endocarditis - acute bacterial w/ Staph likely 2nd to hx IVDU. W/ septic pulmonary emboli due to  R-sided endocarditis. Vancomycin started 23 Dec. Echo 24 Dec w/ preserved EF 55% and large mobile vegetation on tricuspid valve. Infectious Disease consulted and appreciated s/p PICC placed 26 Dec     Acute cystitis - potentially bacteremic spread.  High dose Ceftriaxone initially started 26 Dec to cover gram negatives.  F/u urine culture NGTD.      Sepsis - present on arrival, due to above. IVFs, trended lacate. Hyperbilirubinemia and thrombocytopenia are thought to be manifestations of the sepsis.  Monitored.      Back pain - seems more pleuritic than spinal.  He is at risk for discitis or epidural abscess.  but MRI 26 Dec w/out evidence of same.  He does not have any symptoms of cord compression, and he is already committed to extended course of IV ABX 2nd to above.     HypoNatremia - etiology clinically unable to determine but likely hypovolemic. Follow serial labs on IVF. Reviewed and documented as above. Troponin elevation - of unclear clinical significance w/ etiology clinically unable to determine, w/out signs/sxs of active ischemia. Followed serial troponins - downtrended. Reviewed and documented as above.     Transaminitis - hyperbilirubinemia-worsening of late, MELD NA score of 26  - GI consulted and appreciated w/ hx of chronic HBV/HCV   - MRI abdomen 26 Dec w/o biliary obstruction     Hepatic encephalopathy - per GI note, (Stage 1) in setting of acute infective endocarditis   -Started lactulose TID(titrate to 2-3 soft bowel movements daily)     Anemia - etiology clinically unable to determine, w/out evidence of active bleeding/hemolysis. Stable and asymptomatic w/out indication for transfusion. Follow serial labs.   Reviewed and documented as above.       DVT Prophylaxis: LMWH  Diet: DIET LOW SODIUM 2 GM;  Code Status: Full Code      PT/OT Eval Status: seen w/ recs for SNF Dispo - Medically stable for discharge 30 Dec pending placement decision and subspecialty recs.      Pete Vasquez MD

## 2020-12-30 NOTE — PROGRESS NOTES
[facility]     [facility] ,  [address] [facility]  Phone: [phone]   [facility]:[phone]  [address] [place] [address] 337, [address] [facility]  Phone: [phone]    HPI: Domenic Rico is a(n) 60 y. o. male homeless, with medical history of heroin IV drug use, chronic hepatitis B and C virus infection and history of right nephrectomy post trauma admitted for work-up and treatment for Acute septic pulmonary embolus without acute cor pulmonale ([facility] [I26.90].    We are following for jaundice and abnormal liver chemistries. Now with down trending hemoglobin.      Patient was seen and examined about 7:15 a.m. He reports thirst and feeling dry. Denies abdominal pain, nausea, vomiting, fever, chills, melenic stools or hematochezia. Current Hospital Schedued Meds   vancomycin  1,000 mg Intravenous Q12H    therapeutic multivitamin-minerals  1 tablet Oral Daily    lactulose  20 g Oral TID    vancomycin (VANCOCIN) intermittent dosing (placeholder)   Other RX Placeholder    sodium chloride flush  10 mL Intravenous 2 times per day    cefTRIAXone (ROCEPHIN) IV  2 g Intravenous Q24H    influenza virus vaccine  0.5 mL Intramuscular Prior to discharge    sodium chloride flush  10 mL Intravenous 2 times per day    enoxaparin  40 mg Subcutaneous Daily    nicotine  1 patch Transdermal Daily     Current Hospital IV Meds    Current Hospital Prn Meds  sodium chloride flush, perflutren lipid microspheres, oxyCODONE, sodium chloride flush, acetaminophen **OR** acetaminophen, prochlorperazine, diphenhydrAMINE, [] buprenorphine **AND** cloNIDine, traZODone    I/O last 3 completed shifts: In: 2445 [P.O.:1195;  I.V.:1000; IV Piggyback:250]  Out: 1626 [Urine:1625; Stool:1]  BP (!) 156/76   Pulse 85   Temp 98.6 °F (37 °C) (Oral)   Resp 26   Ht 5' 10\" (1.778 m)   Wt 145 lb (65.8 kg)   SpO2 92%   BMI 20.81 kg/m²   BMs: 4 yellow colored stools yesterday Wt Readings from Last 3 Encounters:   12/23/20 145 lb (65.8 kg)   06/26/19 160 lb (72.6 kg)   08/31/17 160 lb (72.6 kg)       Physical Exam:  VITAL SIGNS: BP (!) 156/76   Pulse 85   Temp 98.6 °F (37 °C) (Oral)   Resp 26   Ht 5' 10\" (1.778 m)   Wt 145 lb (65.8 kg)   SpO2 92%   BMI 20.81 kg/m²   Wt Readings from Last 3 Encounters:   12/23/20 145 lb (65.8 kg)   06/26/19 160 lb (72.6 kg)   08/31/17 160 lb (72.6 kg)     Constitutional: Well developed, Well nourished, No acute distress, Non-toxic appearance. HENT: Normocephalic, Atraumatic, Bilateral external ears normal, Oropharynx moist, No oral exudates, Nose normal.   Eyes: Conjunctiva normal, No discharge. Neck: Normal range of motion, No tenderness, Supple, No stridor. Cardiovascular: Normal heart rate, Normal rhythm, No murmurs, No rubs, No gallops. Thorax & Lungs: Normal breath sounds, No respiratory distress, No wheezing, No chest tenderness. Abdomen: normal bowel sounds, soft, non tender, non distended, scars consistent with stated surgeries, no hernias  Rectal: Normal sphincter tone. Yellow colored stool on glove. No evidence of melena or rectal bleeding. No hemorrhoids. Skin: Warm, Dry, No erythema, No rash. No bruising. No spider hemangiomas. Back: No tenderness, No CVA tenderness. Lower Extremities: Intact distal pulses, No edema, No tenderness, No cyanosis, No clubbing. Neurologic: Alert & oriented x 3, Normal motor function, Normal sensory function, No focal deficits noted. No asterixis.       Lab Results   Component Value Date    ALT 44 (H) 12/30/2020    AST 75 (H) 12/30/2020    ALKPHOS 148 (H) 12/30/2020    BILIDIR 1.2 (H) 12/30/2020    PROT 7.7 12/30/2020    LABALBU 1.3 (L) 12/30/2020    INR 1.61 (H) 12/30/2020    LIPASE 11.0 (L) 12/23/2020     Lab Results   Component Value Date    WBC 20.8 (H) 12/30/2020    HGB 7.5 (L) 12/30/2020    HCT 22.2 (L) 12/30/2020    MCV 91.3 12/30/2020     12/30/2020     Lab Results Component Value Date     12/30/2020    K 4.6 12/30/2020    K 4.2 12/24/2020     12/30/2020    CO2 16 12/30/2020    BUN 41 12/30/2020     Lab Results   Component Value Date    CREATININE 1.1 12/30/2020       A/P  1.  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      2.  Decompensated Cirrhosis hepatitis C/B virus infection with coagulopathy and small ascites due to infective endocarditis with septic emboli.  Ruled out biliary obstruction on MRCP and acute viral hepatitis. -- Improving.      MELD Na = 18 ; CPT class = B;   Negative for acute hepatitis A and B virus infections.   Diet 100 g protein, low sodium < 2 gm/day  Folic acid, thiamine, multivitamin  Discontinued fluid restriction to 1 to 1.5 mL/day with sNa > 130  Hold Lasix 40 mg p.o. daily and Spironolactone 100 mg p.o. daily for now  Monitor CBC, BMP, LFTs, PT/INR daily  Continue IV antibiotics per ID recs for endocarditis. 3. Hepatic encephalopathy (Stage 1) in setting of acute infective endocarditis - resolved. Continue Lactulose 3 times daily titrate to 2-3 soft bowel movements daily  Monitor electrolytes and supplement as needed    4. Acute anemia likely due to intravascular hemolysis in setting of endocarditis. Yellow colored stool on COURTNEY. No evidence of acute GI bleeding  Hb today 7.5 g/dL from 12.8 g/dL on admission 12/23/2020  Monitor Hb/Hct and transfuse to maintain Hb > 7 g/dL  Peripheral smear consistent with hemolysis.   Recommend hematology eval and cardiothoracic evaluation of tricuspid valve vegatation     GI to continue to follow     Principal Problem:    Septic embolism ([address]  Active Problems:    Acute septic pulmonary embolus without acute cor pulmonale (HCC)    Elevated liver enzymes    Chronic hepatitis C without hepatic coma (HCC)    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) ([address]    Hepatic encephalopathy ([address] Resolved Problems:    * No resolved hospital problems. *    Patient Active Problem List   Diagnosis Code    Gastroenteritis K52.9    Nausea & vomiting R11.2    Leukocytosis D72.829    Marijuana smoker F12.90    Acute septic pulmonary embolus without acute cor pulmonale (HCC) I26.90    Septic embolism ([address] I76    Elevated liver enzymes R74.8    Chronic hepatitis C without hepatic coma (HCC) B18.2    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) (HCC) B19.20, K74.69    Hepatic encephalopathy ([facility] [address] K72.90       Thank you for involving [facility]) Gastroenterology in the hospital care of Domenic Rico. For further questions or concerns, we can best be reached through perfect serv.         Kaela Valles 12/30/20 6:50 AM EST

## 2020-12-30 NOTE — PROGRESS NOTES
Physical Therapy  Attempted PT tx session x 2 this date. First attempt 1240 pt c/o feeling very fatigued, speaking in a whisper, politely requesting that he be allowed to rest and try back later. Second attempt , pt continues to c/o extreme fatigue, noted increased respiratory rate and SOB at rest. Will re-attempt at another date as able and as pt tolerates.     Rocio Sainz, PT, DPT

## 2020-12-31 NOTE — CARE COORDINATION
CM spoke at bedside with patient about discharge plan and facility acceptance. Pt is in agreement for [facility] at [facility]. CM asked if patient wanted family called/updated. Pt states \"I will do that\". Primary nurse, [place], updated on plans. CM spoke with Berniece Gowers at [facility] admissions and confirmed facility DID DENY PATIENT. CM team will continue to follow.

## 2020-12-31 NOTE — CARE COORDINATION
[facility] [address] ([phone]). States she is covering for Cherie Pottier today and thru the weekend. CM provides updates and voice uncertainty with disposition time frame. Marlene Crow mentions pt will need precert for acceptance at facility. CM requested that precert be initiated today, since tomorrow is holiday, then the weekend. Marlene Crow states she would. CM will speak with patient today regarding plan.     González Johnston RN

## 2020-12-31 NOTE — PROGRESS NOTES
Occupational Therapy/Physical Therapy Note    Attempted to see patient, chart reviewed and spoke with RN. Per RN pt very fatigued with SOB this date but cleared for therapy to attempt. Attempted to see patient in room, sleeping with heavy breathing. Pt pleasantly declining OOB activities and getting more SOB with prolonged discussion/talking with therapy. Will HOLD this AM and attempt PM. Thank you!     Dana Carey OTR/L Margot Runner, DPT

## 2020-12-31 NOTE — CARE COORDINATION
Payor authorization received by Isabell Gray at [facility]. \"patient can admit under covid waiver\". CM attempted to arrange transport for this evening. [facility] are both without availability for today. CM team will f/u with [facility] team tomorrow. Bedside RN to call Care Core at [address] for report. Pt will go to [facility]. In an effort to arrange a timely discharge tomorrow, CM contacted [facility] and arranged a 1300  for tomorrow.  left on weekend CM number to make team aware.      Almita Carney, RN

## 2020-12-31 NOTE — PROGRESS NOTES
[address] ,  [address] [facility]  Phone: [phone]   ROJ:[phone]  [address] [place] [address],  [address] [facility], [address] [place] [address]  Phone: [phone]   Fax:[phone]    HPI: Michelle Greenfield is a(n)55 y.o. male homeless, with medical history of heroin IV drug use, chronic hepatitis B and C virus infection and history of right nephrectomy post trauma admitted for work-up and treatment for Acute septic pulmonary embolus without acute cor pulmonale ([facility] [address] [I26.90].    We are following for jaundice and abnormal liver chemistries and acute anemia     Patient was seen and examined about 7:45 a.m. Denies abdominal pain, nausea, vomiting, fever, chills, melenic stools or hematochezia. Current Hospital Schedued Meds   vancomycin  750 mg Intravenous Q12H    therapeutic multivitamin-minerals  1 tablet Oral Daily    lactulose  20 g Oral TID    vancomycin (VANCOCIN) intermittent dosing (placeholder)   Other RX Placeholder    sodium chloride flush  10 mL Intravenous 2 times per day    cefTRIAXone (ROCEPHIN) IV  2 g Intravenous Q24H    influenza virus vaccine  0.5 mL Intramuscular Prior to discharge    sodium chloride flush  10 mL Intravenous 2 times per day    enoxaparin  40 mg Subcutaneous Daily    nicotine  1 patch Transdermal Daily     Current Hospital IV Meds    Current Hospital Prn Meds  sodium chloride flush, perflutren lipid microspheres, oxyCODONE, sodium chloride flush, acetaminophen **OR** acetaminophen, prochlorperazine, diphenhydrAMINE, [] buprenorphine **AND** cloNIDine, traZODone    I/O last 3 completed shifts:   In: 360 [P.O.:360]  Out: 826 [Urine:825; Stool:1]  BP (!) 145/87   Pulse 79   Temp 98.5 °F (36.9 °C) (Oral)   Resp 22   Ht 5' 10\" (1.778 m)   Wt 145 lb (65.8 kg)   SpO2 95%   BMI 20.81 kg/m²   BMs: 2  Wt Readings from Last 3 Encounters:   20 145 lb (65.8 kg)   19 160 lb (72.6 kg)   17 160 lb (72.6 kg) Physical Exam:  VITAL SIGNS: BP (!) 145/87   Pulse 79   Temp 98.5 °F (36.9 °C) (Oral)   Resp 22   Ht 5' 10\" (1.778 m)   Wt 145 lb (65.8 kg)   SpO2 95%   BMI 20.81 kg/m²   Wt Readings from Last 3 Encounters:   12/23/20 145 lb (65.8 kg)   06/26/19 160 lb (72.6 kg)   08/31/17 160 lb (72.6 kg)     Constitutional: Ill-appearing, no acute distress   HENT: Normocephalic, Atraumatic, Bilateral external ears normal, Oropharynx moist, No oral exudates, Nose normal.   Eyes: Conjunctiva normal, No discharge. Neck: Normal range of motion, No tenderness, Supple, No stridor. Cardiovascular: Normal heart rate, Normal rhythm, No murmurs, No rubs, No gallops. Thorax & Lungs: Normal breath sounds, No respiratory distress, No wheezing, No chest tenderness. Abdomen: normal bowel sounds, soft, non tender, non distended, scars consistent with stated surgeries, no hernias  Skin: Warm, Dry, No erythema, No rash. No bruising. Back: No tenderness, No CVA tenderness. Lower Extremities: Intact distal pulses, No edema, No tenderness, No cyanosis, No clubbing. Neurologic: Alert & oriented x 3, Normal motor function, Normal sensory function, No focal deficits noted. No asterixis.       Lab Results   Component Value Date    ALT 47 (H) 12/31/2020    AST 79 (H) 12/31/2020    ALKPHOS 143 (H) 12/31/2020    BILIDIR 1.1 (H) 12/31/2020    PROT 7.8 12/31/2020    LABALBU 1.4 (L) 12/31/2020    INR 1.57 (H) 12/31/2020    LIPASE 11.0 (L) 12/23/2020     Lab Results   Component Value Date    WBC 21.1 (H) 12/31/2020    HGB 7.2 (L) 12/31/2020    HCT 21.6 (L) 12/31/2020    MCV 92.6 12/31/2020     12/31/2020     Lab Results   Component Value Date     12/31/2020    K 4.6 12/31/2020    K 4.2 12/24/2020     12/31/2020    CO2 16 12/31/2020    BUN 41 12/31/2020     Lab Results   Component Value Date    CREATININE 1.1 12/31/2020       A/P 1.  Decompensated Cirrhosis hepatitis C/B virus infection with coagulopathy and small ascites due to infective MRSA endocarditis with septic emboli.  Ruled out biliary obstruction on MRCP and acute viral hepatitis. -- Improving.      MELD Na = 19 ; CPT class = B;   Negative for acute hepatitis A and B virus infections.   Diet 100 g protein, low sodium < 2 gm/day  Folic acid, thiamine, multivitamin  Discontinued fluid restriction to 1 to 1.5 mL/day with sNa > 130  Hold Lasix 40 mg p.o. daily and Spironolactone 100 mg p.o. daily for now  Monitor CBC, BMP, LFTs, PT/INR daily  Continue IV antibiotics per ID recs for endocarditis.      2. Hepatic encephalopathy (Stage 1) in setting of acute infective endocarditis - resolved. Continue Lactulose 3 times daily titrate to 2-3 soft bowel movements daily  Monitor electrolytes and supplement as needed     3. Acute anemia likely due to intravascular hemolysis in setting of endocarditis with tricuspid valve vegetation. No evidence of acute GI bleeding. Yellow colored stool on COURTNEY. Hb today 7.2 g/dL from 12.8 g/dL on admission 12/23/2020  Monitor Hb/Hct and transfuse to maintain Hb > 7 g/dL  Hematology eval   No role for endoscopy at present    GI to follow with you     Principal Problem:    Septic embolism (HCC)  Active Problems:    Acute septic pulmonary embolus without acute cor pulmonale (HCC)    Elevated liver enzymes    Chronic hepatitis C without hepatic coma (HCC)    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) ([facility] [address]    Hepatic encephalopathy (HCC)  Resolved Problems:    * No resolved hospital problems.  *    Patient Active Problem List   Diagnosis Code    Gastroenteritis K52.9    Nausea & vomiting R11.2    Leukocytosis D72.829    Marijuana smoker F12.90    Acute septic pulmonary embolus without acute cor pulmonale (HCC) I26.90    Septic embolism (HCC) I76    Elevated liver enzymes R74.8    Chronic hepatitis C without hepatic coma (HCC) B18.2  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) (HCC) B19.20, K74.69    Hepatic encephalopathy ([facility] K72.90       Thank you for involving [facility]) Gastroenterology in the hospital care of [facility]. For further questions or concerns, we can best be reached through perfect serv.         Niraj Hardwick 12/31/20 7:39 AM EST

## 2020-12-31 NOTE — PROGRESS NOTES
Hospitalist Progress Note      PCP: No primary care provider on file. Date of Admission: 2020    Chief Complaint: Pain all over, hurts to breathe    Subjective: no new c/o. Medications:  Reviewed    Infusion Medications     Scheduled Medications    vancomycin  750 mg Intravenous Q12H    therapeutic multivitamin-minerals  1 tablet Oral Daily    lactulose  20 g Oral TID    vancomycin (VANCOCIN) intermittent dosing (placeholder)   Other RX Placeholder    sodium chloride flush  10 mL Intravenous 2 times per day    cefTRIAXone (ROCEPHIN) IV  2 g Intravenous Q24H    influenza virus vaccine  0.5 mL Intramuscular Prior to discharge    sodium chloride flush  10 mL Intravenous 2 times per day    enoxaparin  40 mg Subcutaneous Daily    nicotine  1 patch Transdermal Daily     PRN Meds: sodium chloride flush, perflutren lipid microspheres, oxyCODONE, sodium chloride flush, acetaminophen **OR** acetaminophen, prochlorperazine, diphenhydrAMINE, [] buprenorphine **AND** cloNIDine, traZODone      Intake/Output Summary (Last 24 hours) at 2020 0909  Last data filed at 2020 0904  Gross per 24 hour   Intake 360 ml   Output 1401 ml   Net -1041 ml       Physical Exam Performed:    BP (!) 145/87   Pulse 79   Temp 98.5 °F (36.9 °C) (Oral)   Resp 22   Ht 5' 10\" (1.778 m)   Wt 145 lb (65.8 kg)   SpO2 95%   BMI 20.81 kg/m²     General appearance: No apparent distress, appears stated age and cooperative. HEENT: Pupils equal, round, and reactive to light. Conjunctivae/corneas clear. Neck: Supple, with full range of motion. No jugular venous distention. Trachea midline. Respiratory:  Normal respiratory effort. Clear to auscultation, bilaterally without Rales/Wheezes/Rhonchi. Cardiovascular: Regular rate and rhythm with normal S1/S2 without murmurs, rubs or gallops. Abdomen: Soft, non-tender, non-distended with normal bowel sounds. Musculoskeletal: No clubbing, cyanosis or edema bilaterally. Full range of motion without deformity. Scrotal edema w/out evidence of infection. Skin: Skin color, texture, turgor normal.  No rashes or lesions. Neurologic:  Neurovascularly intact without any focal sensory/motor deficits. Cranial nerves: II-XII intact, grossly non-focal.  Psychiatric: Alert and oriented, thought content appropriate, normal insight  Capillary Refill: Brisk,< 3 seconds   Peripheral Pulses: +2 palpable, equal bilaterally       Labs:   Recent Labs     12/29/20  0600 12/30/20  0500 12/31/20  0515   WBC 24.1* 20.8* 21.1*   HGB 8.3* 7.5* 7.2*   HCT 24.6* 22.2* 21.6*    160 173     Recent Labs     12/29/20  0600 12/30/20  0500 12/31/20  0515   * 132* 134*   K 4.4 4.6 4.6    110 111*   CO2 17* 16* 16*   BUN 41* 41* 41*   CREATININE 1.0 1.1 1.1   CALCIUM 7.7* 7.9* 8.1*   PHOS 2.9 3.7 4.1     Recent Labs     12/29/20  0600 12/30/20  0500 12/31/20  0515   AST 64* 75* 79*   ALT 37 44* 47*   BILIDIR 1.5* 1.2* 1.1*   BILITOT 2.3* 2.2* 2.0*   ALKPHOS 153* 148* 143*     Recent Labs     12/29/20  0600 12/30/20  0500 12/31/20  0515   INR 1.70* 1.61* 1.57*     No results for input(s): CKTOTAL, TROPONINI in the last 72 hours.     Urinalysis:      Lab Results   Component Value Date    NITRU POSITIVE 12/23/2020    WBCUA 21-50 12/23/2020    BACTERIA 3+ 12/23/2020    RBCUA 3-4 12/23/2020    BLOODU SMALL 12/23/2020    SPECGRAV 1.015 12/23/2020    GLUCOSEU Negative 12/23/2020       Consults:    IP CONSULT TO HOSPITALIST  IP CONSULT TO PHARMACY  IP CONSULT TO INFECTIOUS DISEASES  IP CONSULT TO GI      Assessment/Plan:    Active Hospital Problems    Diagnosis    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) ([facility] [B19.20, K74.69]    Hepatic encephalopathy ([facility] [K72.90]    Septic embolism ([facility] Angel Lyman    Elevated liver enzymes [R74.8]    Chronic hepatitis C without hepatic coma ([facility] [B18.2]  Acute septic pulmonary embolus without acute cor pulmonale (HCC) [I26.90]        Endocarditis - acute bacterial w/ Staph likely 2nd to hx IVDU. W/ septic pulmonary emboli due to  R-sided endocarditis. Vancomycin started 23 Dec. Echo 24 Dec w/ preserved EF 55% and large mobile vegetation on tricuspid valve. Infectious Disease consulted and appreciated s/p PICC placed 26 Dec     Acute cystitis - potentially bacteremic spread.  High dose Ceftriaxone initially started 26 Dec to cover gram negatives.  F/u urine culture NGTD.      Sepsis - present on arrival, due to above. IVFs, trended lacate. Hyperbilirubinemia and thrombocytopenia are thought to be manifestations of the sepsis.  Monitored.      Back pain - seems more pleuritic than spinal.  He is at risk for discitis or epidural abscess.  but MRI 26 Dec w/out evidence of same.  He does not have any symptoms of cord compression, and he is already committed to extended course of IV ABX 2nd to above.     HypoNatremia - etiology clinically unable to determine but likely hypovolemic. Follow serial labs on IVF. Reviewed and documented as above. Troponin elevation - of unclear clinical significance w/ etiology clinically unable to determine, w/out signs/sxs of active ischemia. Followed serial troponins - downtrended. Reviewed and documented as above.     Transaminitis - w/ hyperbilirubinemia. GI consulted and appreciated w/ hx of chronic HBV/HCV. MRI abdomen 26 Dec w/o biliary obstruction. Will continue to follow serial labs. Reviewed and documented as above.     Hepatic encephalopathy - per GI note, (Stage 1) in setting of acute infective endocarditis. Started Lactulose and resolved - continue.      Anemia - etiology clinically unable to determine, w/out evidence of active bleeding/hemolysis. Stable and asymptomatic w/out indication for transfusion. Follow serial labs.   Reviewed and documented as above.       DVT Prophylaxis: LMWH

## 2020-12-31 NOTE — CARE COORDINATION
KARLEY spoke to [place] with centralized admissions at [facility]. She is enroute to work now. She states she is pretty sure the facility denied patient. But, will verify when she arrived at office around 0900. CM will f/u with her at that time.  left for Susana Baeza with Demond Mitchell at [facility] for call back, to provide updates and possible disposition time frame. CM messaged provider for time frame on discharge - unclear from provider notes.     Maryan Castaneda RN

## 2021-01-01 ENCOUNTER — APPOINTMENT (OUTPATIENT)
Dept: MRI IMAGING | Age: 56
DRG: 720 | End: 2021-01-01
Attending: INTERNAL MEDICINE
Payer: MEDICARE

## 2021-01-01 ENCOUNTER — TELEPHONE (OUTPATIENT)
Dept: CASE MANAGEMENT | Age: 56
End: 2021-01-01

## 2021-01-01 ENCOUNTER — HOSPITAL ENCOUNTER (INPATIENT)
Age: 56
LOS: 2 days | Discharge: HOSPICE | DRG: 720 | End: 2021-01-17
Attending: INTERNAL MEDICINE | Admitting: INTERNAL MEDICINE
Payer: MEDICARE

## 2021-01-01 ENCOUNTER — TELEPHONE (OUTPATIENT)
Dept: INFECTIOUS DISEASES | Age: 56
End: 2021-01-01

## 2021-01-01 ENCOUNTER — HOSPITAL ENCOUNTER (INPATIENT)
Age: 56
LOS: 1 days | DRG: 720 | End: 2021-01-18
Attending: EMERGENCY MEDICINE | Admitting: INTERNAL MEDICINE
Payer: MEDICARE

## 2021-01-01 ENCOUNTER — APPOINTMENT (OUTPATIENT)
Dept: GENERAL RADIOLOGY | Age: 56
DRG: 720 | End: 2021-01-01
Attending: INTERNAL MEDICINE
Payer: MEDICARE

## 2021-01-01 VITALS
BODY MASS INDEX: 20.76 KG/M2 | DIASTOLIC BLOOD PRESSURE: 87 MMHG | WEIGHT: 145 LBS | HEART RATE: 88 BPM | OXYGEN SATURATION: 97 % | HEIGHT: 70 IN | TEMPERATURE: 97.6 F | RESPIRATION RATE: 20 BRPM | SYSTOLIC BLOOD PRESSURE: 145 MMHG

## 2021-01-01 VITALS
RESPIRATION RATE: 20 BRPM | TEMPERATURE: 97.8 F | SYSTOLIC BLOOD PRESSURE: 100 MMHG | DIASTOLIC BLOOD PRESSURE: 63 MMHG | HEART RATE: 108 BPM | OXYGEN SATURATION: 93 %

## 2021-01-01 VITALS
HEART RATE: 90 BPM | BODY MASS INDEX: 24.37 KG/M2 | WEIGHT: 170.19 LBS | SYSTOLIC BLOOD PRESSURE: 128 MMHG | TEMPERATURE: 97.8 F | HEIGHT: 70 IN | OXYGEN SATURATION: 89 % | DIASTOLIC BLOOD PRESSURE: 84 MMHG | RESPIRATION RATE: 24 BRPM

## 2021-01-01 DIAGNOSIS — I33.0 INFECTIVE ENDOCARDITIS, DUE TO UNSPECIFIED ORGANISM, UNSPECIFIED CHRONICITY: Primary | ICD-10-CM

## 2021-01-01 LAB
A/G RATIO: 0.8 (ref 1.1–2.2)
A/G RATIO: 1 (ref 1.1–2.2)
ALBUMIN SERPL-MCNC: 1.5 G/DL
ALBUMIN SERPL-MCNC: 1.5 G/DL (ref 3.4–5)
ALBUMIN SERPL-MCNC: 2.7 G/DL (ref 3.4–5)
ALBUMIN SERPL-MCNC: 3.1 G/DL (ref 3.4–5)
ALP BLD-CCNC: 111 U/L
ALP BLD-CCNC: 140 U/L (ref 40–129)
ALP BLD-CCNC: 59 U/L (ref 40–129)
ALP BLD-CCNC: 73 U/L (ref 40–129)
ALT SERPL-CCNC: 20 U/L (ref 10–40)
ALT SERPL-CCNC: 23 U/L (ref 10–40)
ALT SERPL-CCNC: 39 U/L (ref 10–40)
ALT SERPL-CCNC: 75 U/L
AMMONIA: 44 UMOL/L (ref 16–60)
ANION GAP SERPL CALCULATED.3IONS-SCNC: 6 MMOL/L (ref 3–16)
ANION GAP SERPL CALCULATED.3IONS-SCNC: 7 MMOL/L (ref 3–16)
ANION GAP SERPL CALCULATED.3IONS-SCNC: 8 MMOL/L (ref 3–16)
ANION GAP SERPL CALCULATED.3IONS-SCNC: ABNORMAL MMOL/L
ANISOCYTOSIS: ABNORMAL
APTT: 43.4 SEC (ref 24.2–36.2)
AST SERPL-CCNC: 134 U/L
AST SERPL-CCNC: 37 U/L (ref 15–37)
AST SERPL-CCNC: 41 U/L (ref 15–37)
AST SERPL-CCNC: 64 U/L (ref 15–37)
BASE EXCESS ARTERIAL: -2 MMOL/L (ref -3–3)
BASE EXCESS ARTERIAL: -4 (ref -3–3)
BASE EXCESS ARTERIAL: -6 (ref -3–3)
BASOPHILS ABSOLUTE: 0 K/UL (ref 0–0.2)
BASOPHILS ABSOLUTE: 0.1 K/UL (ref 0–0.2)
BASOPHILS ABSOLUTE: 0.1 K/UL (ref 0–0.2)
BASOPHILS ABSOLUTE: 0.2 /ΜL
BASOPHILS RELATIVE PERCENT: 0.3 %
BASOPHILS RELATIVE PERCENT: 0.4 %
BASOPHILS RELATIVE PERCENT: 1 %
BASOPHILS RELATIVE PERCENT: 1 %
BILIRUB SERPL-MCNC: 0.7 MG/DL (ref 0–1)
BILIRUB SERPL-MCNC: 0.7 MG/DL (ref 0–1)
BILIRUB SERPL-MCNC: 1.6 MG/DL (ref 0–1)
BILIRUB SERPL-MCNC: 1.7 MG/DL (ref 0.1–1.4)
BILIRUBIN DIRECT: 0.8 MG/DL (ref 0–0.3)
BILIRUBIN, INDIRECT: 0.8 MG/DL (ref 0–1)
BUN BLDV-MCNC: 47 MG/DL
BUN BLDV-MCNC: 49 MG/DL (ref 7–20)
BUN BLDV-MCNC: 81 MG/DL (ref 7–20)
BUN BLDV-MCNC: 84 MG/DL (ref 7–20)
C-REACTIVE PROTEIN: 89.5
CALCIUM IONIZED: 1.24 MMOL/L (ref 1.12–1.32)
CALCIUM IONIZED: 1.35 MMOL/L (ref 1.12–1.32)
CALCIUM SERPL-MCNC: 7.9 MG/DL
CALCIUM SERPL-MCNC: 8.2 MG/DL (ref 8.3–10.6)
CALCIUM SERPL-MCNC: 8.6 MG/DL (ref 8.3–10.6)
CALCIUM SERPL-MCNC: 8.6 MG/DL (ref 8.3–10.6)
CARBOXYHEMOGLOBIN ARTERIAL: 0.6 % (ref 0–1.5)
CHLORIDE BLD-SCNC: 111 MMOL/L (ref 99–110)
CHLORIDE BLD-SCNC: 114 MMOL/L (ref 99–110)
CHLORIDE BLD-SCNC: 114 MMOL/L (ref 99–110)
CHLORIDE BLD-SCNC: 118 MMOL/L
CO2: 14 MMOL/L
CO2: 15 MMOL/L (ref 21–32)
CO2: 21 MMOL/L (ref 21–32)
CO2: 22 MMOL/L (ref 21–32)
CREAT SERPL-MCNC: 1.1 MG/DL (ref 0.9–1.3)
CREAT SERPL-MCNC: 1.3 MG/DL
CREAT SERPL-MCNC: 2.5 MG/DL (ref 0.9–1.3)
CREAT SERPL-MCNC: 3.1 MG/DL (ref 0.9–1.3)
EKG ATRIAL RATE: 82 BPM
EKG DIAGNOSIS: NORMAL
EKG P AXIS: 65 DEGREES
EKG P-R INTERVAL: 162 MS
EKG Q-T INTERVAL: 364 MS
EKG QRS DURATION: 92 MS
EKG QTC CALCULATION (BAZETT): 425 MS
EKG R AXIS: 51 DEGREES
EKG T AXIS: 44 DEGREES
EKG VENTRICULAR RATE: 82 BPM
EOSINOPHILS ABSOLUTE: 0.1 K/UL (ref 0–0.6)
EOSINOPHILS ABSOLUTE: 0.1 K/UL (ref 0–0.6)
EOSINOPHILS ABSOLUTE: 0.2 /ΜL
EOSINOPHILS ABSOLUTE: 0.2 K/UL (ref 0–0.6)
EOSINOPHILS RELATIVE PERCENT: 0.6 %
EOSINOPHILS RELATIVE PERCENT: 1 %
EOSINOPHILS RELATIVE PERCENT: 1.2 %
EOSINOPHILS RELATIVE PERCENT: 1.8 %
ESTIMATED AVERAGE GLUCOSE: 71 MG/DL
GFR AFRICAN AMERICAN: 25
GFR AFRICAN AMERICAN: 32
GFR AFRICAN AMERICAN: >60
GFR CALCULATED: ABNORMAL
GFR NON-AFRICAN AMERICAN: 21
GFR NON-AFRICAN AMERICAN: 27
GFR NON-AFRICAN AMERICAN: >60
GLOBULIN: 3.1 G/DL
GLOBULIN: 3.4 G/DL
GLUCOSE BLD-MCNC: 100 MG/DL (ref 70–99)
GLUCOSE BLD-MCNC: 103 MG/DL (ref 70–99)
GLUCOSE BLD-MCNC: 106 MG/DL (ref 70–99)
GLUCOSE BLD-MCNC: 109 MG/DL (ref 70–99)
GLUCOSE BLD-MCNC: 110 MG/DL (ref 70–99)
GLUCOSE BLD-MCNC: 156 MG/DL (ref 70–99)
GLUCOSE BLD-MCNC: 92 MG/DL (ref 70–99)
GLUCOSE BLD-MCNC: 96 MG/DL
GLUCOSE BLD-MCNC: 97 MG/DL (ref 70–99)
HAPTOGLOBIN: 53 MG/DL (ref 30–200)
HBA1C MFR BLD: 4.1 %
HCO3 ARTERIAL: 21.9 MMOL/L (ref 21–29)
HCO3 ARTERIAL: 22.5 MMOL/L (ref 21–29)
HCO3 ARTERIAL: 22.8 MMOL/L (ref 21–29)
HCT VFR BLD CALC: 21 % (ref 40.5–52.5)
HCT VFR BLD CALC: 21.2 % (ref 41–53)
HCT VFR BLD CALC: 22.7 % (ref 40.5–52.5)
HCT VFR BLD CALC: 23.4 % (ref 40.5–52.5)
HEMOGLOBIN, ART, EXTENDED: 7.7 G/DL (ref 13.5–17.5)
HEMOGLOBIN: 6.7 G/DL (ref 13.5–17.5)
HEMOGLOBIN: 6.9 G/DL (ref 13.5–17.5)
HEMOGLOBIN: 7.1 G/DL (ref 13.5–17.5)
HEMOGLOBIN: 7.5 G/DL (ref 13.5–17.5)
HYPOCHROMIA: ABNORMAL
IMMATURE RETIC FRACT: 0.57 (ref 0.21–0.37)
INR BLD: 1.54 (ref 0.86–1.14)
INR BLD: 2.25 (ref 0.86–1.14)
LACTATE DEHYDROGENASE: 131 U/L (ref 100–190)
LACTATE: 0.63 MMOL/L (ref 0.4–2)
LACTATE: 0.91 MMOL/L (ref 0.4–2)
LACTIC ACID: 0.7 MMOL/L (ref 0.4–2)
LYMPHOCYTES ABSOLUTE: 1.1 K/UL (ref 1–5.1)
LYMPHOCYTES ABSOLUTE: 1.6 K/UL (ref 1–5.1)
LYMPHOCYTES ABSOLUTE: 2.1 K/UL (ref 1–5.1)
LYMPHOCYTES ABSOLUTE: 2.2 /ΜL
LYMPHOCYTES RELATIVE PERCENT: 12.7 %
LYMPHOCYTES RELATIVE PERCENT: 14.1 %
LYMPHOCYTES RELATIVE PERCENT: 16.9 %
LYMPHOCYTES RELATIVE PERCENT: 9.7 %
MACROCYTES: ABNORMAL
MAGNESIUM: 2 MG/DL (ref 1.8–2.4)
MAGNESIUM: 2 MG/DL (ref 1.8–2.4)
MAGNESIUM: 2.5 MG/DL (ref 1.8–2.4)
MCH RBC QN AUTO: 30.6 PG (ref 26–34)
MCH RBC QN AUTO: 31.2 PG (ref 26–34)
MCH RBC QN AUTO: 31.9 PG (ref 26–34)
MCH RBC QN AUTO: 32 PG
MCHC RBC AUTO-ENTMCNC: 31.3 G/DL (ref 31–36)
MCHC RBC AUTO-ENTMCNC: 31.9 G/DL (ref 31–36)
MCHC RBC AUTO-ENTMCNC: 32.2 G/DL (ref 31–36)
MCHC RBC AUTO-ENTMCNC: 32.8 G/DL
MCV RBC AUTO: 101.7 FL (ref 80–100)
MCV RBC AUTO: 95.3 FL (ref 80–100)
MCV RBC AUTO: 97.6 FL
MCV RBC AUTO: 97.7 FL (ref 80–100)
METHEMOGLOBIN ARTERIAL: 0.6 %
MICROCYTES: ABNORMAL
MONOCYTES ABSOLUTE: 0.5 K/UL (ref 0–1.3)
MONOCYTES ABSOLUTE: 0.8 K/UL (ref 0–1.3)
MONOCYTES ABSOLUTE: 0.9 /ΜL
MONOCYTES ABSOLUTE: 1 K/UL (ref 0–1.3)
MONOCYTES RELATIVE PERCENT: 4.5 %
MONOCYTES RELATIVE PERCENT: 5.3 %
MONOCYTES RELATIVE PERCENT: 6.6 %
MONOCYTES RELATIVE PERCENT: 8.7 %
NEUTROPHILS ABSOLUTE: 13.6 /ΜL
NEUTROPHILS ABSOLUTE: 18.1 K/UL (ref 1.7–7.7)
NEUTROPHILS ABSOLUTE: 6.3 K/UL (ref 1.7–7.7)
NEUTROPHILS ABSOLUTE: 6.8 K/UL (ref 1.7–7.7)
NEUTROPHILS RELATIVE PERCENT: 71.6 %
NEUTROPHILS RELATIVE PERCENT: 77.8 %
NEUTROPHILS RELATIVE PERCENT: 80 %
NEUTROPHILS RELATIVE PERCENT: 84.8 %
O2 CONTENT ARTERIAL: 11 ML/DL
O2 SAT, ARTERIAL: 97 % (ref 93–100)
O2 SAT, ARTERIAL: 98 % (ref 93–100)
O2 SAT, ARTERIAL: 98.1 %
O2 THERAPY: ABNORMAL
OSMOLALITY: 333 MOSM/KG (ref 275–295)
PCO2 ARTERIAL: 33.1 MMHG (ref 35–45)
PCO2 ARTERIAL: 49.5 MM HG (ref 35–45)
PCO2 ARTERIAL: 57.7 MM HG (ref 35–45)
PDW BLD-RTO: 18.6 % (ref 12.4–15.4)
PDW BLD-RTO: 22.3 % (ref 12.4–15.4)
PDW BLD-RTO: 22.9 % (ref 12.4–15.4)
PDW BLD-RTO: 23.9 %
PERFORMED ON: ABNORMAL
PERFORMED ON: NORMAL
PH ARTERIAL: 7.2 (ref 7.35–7.45)
PH ARTERIAL: 7.27 (ref 7.35–7.45)
PH ARTERIAL: 7.44 (ref 7.35–7.45)
PH VENOUS: 7.16 (ref 7.35–7.45)
PHOSPHORUS: 4 MG/DL (ref 2.5–4.9)
PHOSPHORUS: 5 MG/DL (ref 2.5–4.9)
PHOSPHORUS: 5.2 MG/DL (ref 2.5–4.9)
PLATELET # BLD: 124 K/ΜL
PLATELET # BLD: 164 K/UL (ref 135–450)
PLATELET # BLD: 73 K/UL (ref 135–450)
PLATELET # BLD: ABNORMAL K/UL (ref 135–450)
PLATELET SLIDE REVIEW: ABNORMAL
PLATELET SLIDE REVIEW: ABNORMAL
PMV BLD AUTO: 8.4 FL
PMV BLD AUTO: 8.5 FL (ref 5–10.5)
PMV BLD AUTO: 9.6 FL (ref 5–10.5)
PMV BLD AUTO: ABNORMAL FL (ref 5–10.5)
PO2 ARTERIAL: 103.2 MM HG (ref 75–108)
PO2 ARTERIAL: 120.1 MMHG (ref 75–108)
PO2 ARTERIAL: 131.7 MM HG (ref 75–108)
POC POTASSIUM: 5.3 MMOL/L (ref 3.5–5.1)
POC POTASSIUM: 5.9 MMOL/L (ref 3.5–5.1)
POC SAMPLE TYPE: ABNORMAL
POC SAMPLE TYPE: ABNORMAL
POTASSIUM REFLEX MAGNESIUM: 4.8 MMOL/L (ref 3.5–5.1)
POTASSIUM REFLEX MAGNESIUM: 5.9 MMOL/L (ref 3.5–5.1)
POTASSIUM SERPL-SCNC: 4.3 MMOL/L (ref 3.5–5.1)
POTASSIUM SERPL-SCNC: 4.8 MMOL/L (ref 3.5–5.1)
POTASSIUM SERPL-SCNC: 5.2 MMOL/L
PROTHROMBIN TIME: 17.9 SEC (ref 10–13.2)
PROTHROMBIN TIME: 26.3 SEC (ref 10–13.2)
RBC # BLD: 2.15 M/UL (ref 4.2–5.9)
RBC # BLD: 2.17 10^6/ΜL
RBC # BLD: 2.23 M/UL (ref 4.2–5.9)
RBC # BLD: 2.46 M/UL (ref 4.2–5.9)
RETICULOCYTE ABSOLUTE COUNT: 0.03 M/UL
RETICULOCYTE COUNT PCT: 1.44 % (ref 0.5–2.18)
SARS-COV-2, NAAT: NOT DETECTED
SARS-COV-2, NAAT: NOT DETECTED
SEDIMENTATION RATE, ERYTHROCYTE: 107
SLIDE REVIEW: ABNORMAL
SLIDE REVIEW: ABNORMAL
SODIUM BLD-SCNC: 137 MMOL/L (ref 136–145)
SODIUM BLD-SCNC: 139 MMOL/L (ref 136–145)
SODIUM BLD-SCNC: 142 MMOL/L (ref 136–145)
SODIUM BLD-SCNC: 5.2 MMOL/L
SPECIMEN STATUS: NORMAL
T4 FREE: 1 NG/DL (ref 0.9–1.8)
TCO2 ARTERIAL: 22.9 MMOL/L
TCO2 ARTERIAL: 24 MMOL/L
TCO2 ARTERIAL: 24 MMOL/L
TOTAL CK: 36 U/L (ref 39–308)
TOTAL PROTEIN: 6.1 G/DL (ref 6.4–8.2)
TOTAL PROTEIN: 6.2 G/DL (ref 6.4–8.2)
TOTAL PROTEIN: 7.2
TOTAL PROTEIN: 7.7 G/DL (ref 6.4–8.2)
TOXIC GRANULATION: PRESENT
TROPONIN: 0.02 NG/ML
TROPONIN: 0.03 NG/ML
TROPONIN: <0.01 NG/ML
TSH REFLEX: 10.81 UIU/ML (ref 0.27–4.2)
VANCOMYCIN RANDOM: 20.4 UG/ML
VANCOMYCIN RANDOM: 22.5 UG/ML
VANCOMYCIN TROUGH: 25.1 UG/ML (ref 10–20)
VANCOMYCIN TROUGH: 32
WBC # BLD: 17.2 10^3/ML
WBC # BLD: 21.4 K/UL (ref 4–11)
WBC # BLD: 8.1 K/UL (ref 4–11)
WBC # BLD: 9.5 K/UL (ref 4–11)

## 2021-01-01 PROCEDURE — 2580000003 HC RX 258: Performed by: INTERNAL MEDICINE

## 2021-01-01 PROCEDURE — 94761 N-INVAS EAR/PLS OXIMETRY MLT: CPT

## 2021-01-01 PROCEDURE — 82330 ASSAY OF CALCIUM: CPT

## 2021-01-01 PROCEDURE — 99291 CRITICAL CARE FIRST HOUR: CPT | Performed by: INTERNAL MEDICINE

## 2021-01-01 PROCEDURE — 82140 ASSAY OF AMMONIA: CPT

## 2021-01-01 PROCEDURE — 6370000000 HC RX 637 (ALT 250 FOR IP)

## 2021-01-01 PROCEDURE — 5A1945Z RESPIRATORY VENTILATION, 24-96 CONSECUTIVE HOURS: ICD-10-PCS | Performed by: INTERNAL MEDICINE

## 2021-01-01 PROCEDURE — 85730 THROMBOPLASTIN TIME PARTIAL: CPT

## 2021-01-01 PROCEDURE — 80053 COMPREHEN METABOLIC PANEL: CPT

## 2021-01-01 PROCEDURE — 82550 ASSAY OF CK (CPK): CPT

## 2021-01-01 PROCEDURE — 82803 BLOOD GASES ANY COMBINATION: CPT

## 2021-01-01 PROCEDURE — 6370000000 HC RX 637 (ALT 250 FOR IP): Performed by: INTERNAL MEDICINE

## 2021-01-01 PROCEDURE — 84484 ASSAY OF TROPONIN QUANT: CPT

## 2021-01-01 PROCEDURE — 85045 AUTOMATED RETICULOCYTE COUNT: CPT

## 2021-01-01 PROCEDURE — 1200000000 HC SEMI PRIVATE

## 2021-01-01 PROCEDURE — 6360000002 HC RX W HCPCS: Performed by: INTERNAL MEDICINE

## 2021-01-01 PROCEDURE — 80202 ASSAY OF VANCOMYCIN: CPT

## 2021-01-01 PROCEDURE — 85025 COMPLETE CBC W/AUTO DIFF WBC: CPT

## 2021-01-01 PROCEDURE — 36415 COLL VENOUS BLD VENIPUNCTURE: CPT

## 2021-01-01 PROCEDURE — 99223 1ST HOSP IP/OBS HIGH 75: CPT | Performed by: PSYCHIATRY & NEUROLOGY

## 2021-01-01 PROCEDURE — 99284 EMERGENCY DEPT VISIT MOD MDM: CPT

## 2021-01-01 PROCEDURE — 87040 BLOOD CULTURE FOR BACTERIA: CPT

## 2021-01-01 PROCEDURE — 96374 THER/PROPH/DIAG INJ IV PUSH: CPT

## 2021-01-01 PROCEDURE — 83036 HEMOGLOBIN GLYCOSYLATED A1C: CPT

## 2021-01-01 PROCEDURE — 84443 ASSAY THYROID STIM HORMONE: CPT

## 2021-01-01 PROCEDURE — 99255 IP/OBS CONSLTJ NEW/EST HI 80: CPT | Performed by: THORACIC SURGERY (CARDIOTHORACIC VASCULAR SURGERY)

## 2021-01-01 PROCEDURE — 94002 VENT MGMT INPAT INIT DAY: CPT

## 2021-01-01 PROCEDURE — 2500000003 HC RX 250 WO HCPCS: Performed by: INTERNAL MEDICINE

## 2021-01-01 PROCEDURE — 83735 ASSAY OF MAGNESIUM: CPT

## 2021-01-01 PROCEDURE — 83605 ASSAY OF LACTIC ACID: CPT

## 2021-01-01 PROCEDURE — 93010 ELECTROCARDIOGRAM REPORT: CPT | Performed by: INTERNAL MEDICINE

## 2021-01-01 PROCEDURE — 2700000000 HC OXYGEN THERAPY PER DAY

## 2021-01-01 PROCEDURE — 80076 HEPATIC FUNCTION PANEL: CPT

## 2021-01-01 PROCEDURE — 71045 X-RAY EXAM CHEST 1 VIEW: CPT

## 2021-01-01 PROCEDURE — 85610 PROTHROMBIN TIME: CPT

## 2021-01-01 PROCEDURE — 84439 ASSAY OF FREE THYROXINE: CPT

## 2021-01-01 PROCEDURE — 80069 RENAL FUNCTION PANEL: CPT

## 2021-01-01 PROCEDURE — 82947 ASSAY GLUCOSE BLOOD QUANT: CPT

## 2021-01-01 PROCEDURE — 83930 ASSAY OF BLOOD OSMOLALITY: CPT

## 2021-01-01 PROCEDURE — 6370000000 HC RX 637 (ALT 250 FOR IP): Performed by: NURSE PRACTITIONER

## 2021-01-01 PROCEDURE — 6360000002 HC RX W HCPCS: Performed by: EMERGENCY MEDICINE

## 2021-01-01 PROCEDURE — U0002 COVID-19 LAB TEST NON-CDC: HCPCS

## 2021-01-01 PROCEDURE — 84100 ASSAY OF PHOSPHORUS: CPT

## 2021-01-01 PROCEDURE — 83615 LACTATE (LD) (LDH) ENZYME: CPT

## 2021-01-01 PROCEDURE — 94003 VENT MGMT INPAT SUBQ DAY: CPT

## 2021-01-01 PROCEDURE — 83010 ASSAY OF HAPTOGLOBIN QUANT: CPT

## 2021-01-01 PROCEDURE — 99255 IP/OBS CONSLTJ NEW/EST HI 80: CPT | Performed by: INTERNAL MEDICINE

## 2021-01-01 PROCEDURE — 70551 MRI BRAIN STEM W/O DYE: CPT

## 2021-01-01 PROCEDURE — 84132 ASSAY OF SERUM POTASSIUM: CPT

## 2021-01-01 PROCEDURE — 93005 ELECTROCARDIOGRAM TRACING: CPT | Performed by: INTERNAL MEDICINE

## 2021-01-01 PROCEDURE — 36620 INSERTION CATHETER ARTERY: CPT

## 2021-01-01 PROCEDURE — 2000000000 HC ICU R&B

## 2021-01-01 RX ORDER — NICOTINE POLACRILEX 4 MG
15 LOZENGE BUCCAL PRN
Status: DISCONTINUED | OUTPATIENT
Start: 2021-01-01 | End: 2021-01-01 | Stop reason: SDUPTHER

## 2021-01-01 RX ORDER — SODIUM CHLORIDE 9 MG/ML
INJECTION, SOLUTION INTRAVENOUS CONTINUOUS
Status: DISCONTINUED | OUTPATIENT
Start: 2021-01-01 | End: 2021-01-19 | Stop reason: HOSPADM

## 2021-01-01 RX ORDER — NICOTINE 21 MG/24HR
1 PATCH, TRANSDERMAL 24 HOURS TRANSDERMAL EVERY 24 HOURS
Qty: 14 PATCH | Refills: 0 | Status: ON HOLD | OUTPATIENT
Start: 2021-01-01 | End: 2021-01-01 | Stop reason: HOSPADM

## 2021-01-01 RX ORDER — MAGNESIUM SULFATE 1 G/100ML
1 INJECTION INTRAVENOUS PRN
Status: DISCONTINUED | OUTPATIENT
Start: 2021-01-01 | End: 2021-01-01

## 2021-01-01 RX ORDER — MORPHINE SULFATE 4 MG/ML
4 INJECTION, SOLUTION INTRAMUSCULAR; INTRAVENOUS
Status: DISCONTINUED | OUTPATIENT
Start: 2021-01-01 | End: 2021-01-01 | Stop reason: HOSPADM

## 2021-01-01 RX ORDER — ACETAMINOPHEN 325 MG/1
650 TABLET ORAL EVERY 6 HOURS PRN
Status: DISCONTINUED | OUTPATIENT
Start: 2021-01-01 | End: 2021-01-01 | Stop reason: HOSPADM

## 2021-01-01 RX ORDER — SODIUM CHLORIDE 9 MG/ML
INJECTION, SOLUTION INTRAVENOUS CONTINUOUS
Status: DISCONTINUED | OUTPATIENT
Start: 2021-01-01 | End: 2021-01-01

## 2021-01-01 RX ORDER — TRAZODONE HYDROCHLORIDE 50 MG/1
50 TABLET ORAL NIGHTLY PRN
Qty: 7 TABLET | Refills: 0 | Status: ON HOLD | OUTPATIENT
Start: 2021-01-01 | End: 2021-01-01 | Stop reason: HOSPADM

## 2021-01-01 RX ORDER — PROMETHAZINE HYDROCHLORIDE 25 MG/1
12.5 TABLET ORAL EVERY 6 HOURS PRN
Status: DISCONTINUED | OUTPATIENT
Start: 2021-01-01 | End: 2021-01-01 | Stop reason: HOSPADM

## 2021-01-01 RX ORDER — SODIUM CHLORIDE 0.9 % (FLUSH) 0.9 %
10 SYRINGE (ML) INJECTION EVERY 12 HOURS SCHEDULED
Status: DISCONTINUED | OUTPATIENT
Start: 2021-01-01 | End: 2021-01-19 | Stop reason: HOSPADM

## 2021-01-01 RX ORDER — NICOTINE POLACRILEX 4 MG
15 LOZENGE BUCCAL PRN
Status: DISCONTINUED | OUTPATIENT
Start: 2021-01-01 | End: 2021-01-01

## 2021-01-01 RX ORDER — 0.9 % SODIUM CHLORIDE 0.9 %
1000 INTRAVENOUS SOLUTION INTRAVENOUS ONCE
Status: DISCONTINUED | OUTPATIENT
Start: 2021-01-01 | End: 2021-01-01

## 2021-01-01 RX ORDER — PROMETHAZINE HYDROCHLORIDE 25 MG/1
12.5 TABLET ORAL EVERY 6 HOURS PRN
Status: DISCONTINUED | OUTPATIENT
Start: 2021-01-01 | End: 2021-01-19 | Stop reason: HOSPADM

## 2021-01-01 RX ORDER — DEXTROSE MONOHYDRATE 25 G/50ML
25 INJECTION, SOLUTION INTRAVENOUS ONCE
Status: COMPLETED | OUTPATIENT
Start: 2021-01-01 | End: 2021-01-01

## 2021-01-01 RX ORDER — SODIUM CHLORIDE 0.9 % (FLUSH) 0.9 %
10 SYRINGE (ML) INJECTION PRN
Status: DISCONTINUED | OUTPATIENT
Start: 2021-01-01 | End: 2021-01-19 | Stop reason: HOSPADM

## 2021-01-01 RX ORDER — CALCIUM GLUCONATE 20 MG/ML
1 INJECTION, SOLUTION INTRAVENOUS PRN
Status: DISCONTINUED | OUTPATIENT
Start: 2021-01-01 | End: 2021-01-01

## 2021-01-01 RX ORDER — OXYCODONE HYDROCHLORIDE 5 MG/1
5 TABLET ORAL EVERY 6 HOURS PRN
Qty: 28 TABLET | Refills: 0 | Status: SHIPPED | OUTPATIENT
Start: 2021-01-01 | End: 2021-01-01

## 2021-01-01 RX ORDER — DEXTROSE MONOHYDRATE 25 G/50ML
12.5 INJECTION, SOLUTION INTRAVENOUS PRN
Status: DISCONTINUED | OUTPATIENT
Start: 2021-01-01 | End: 2021-01-01

## 2021-01-01 RX ORDER — ONDANSETRON 2 MG/ML
4 INJECTION INTRAMUSCULAR; INTRAVENOUS EVERY 6 HOURS PRN
Status: DISCONTINUED | OUTPATIENT
Start: 2021-01-01 | End: 2021-01-19 | Stop reason: HOSPADM

## 2021-01-01 RX ORDER — SODIUM POLYSTYRENE SULFONATE 15 G/60ML
15 SUSPENSION ORAL; RECTAL ONCE
Status: COMPLETED | OUTPATIENT
Start: 2021-01-01 | End: 2021-01-01

## 2021-01-01 RX ORDER — DEXTROSE MONOHYDRATE 50 MG/ML
100 INJECTION, SOLUTION INTRAVENOUS PRN
Status: DISCONTINUED | OUTPATIENT
Start: 2021-01-01 | End: 2021-01-01 | Stop reason: SDUPTHER

## 2021-01-01 RX ORDER — ASPIRIN 81 MG/1
81 TABLET, CHEWABLE ORAL DAILY
Status: DISCONTINUED | OUTPATIENT
Start: 2021-01-01 | End: 2021-01-19 | Stop reason: HOSPADM

## 2021-01-01 RX ORDER — POTASSIUM CHLORIDE 29.8 MG/ML
20 INJECTION INTRAVENOUS PRN
Status: DISCONTINUED | OUTPATIENT
Start: 2021-01-01 | End: 2021-01-01

## 2021-01-01 RX ORDER — LACTULOSE 10 G/15ML
20 SOLUTION ORAL 3 TIMES DAILY
Status: DISCONTINUED | OUTPATIENT
Start: 2021-01-01 | End: 2021-01-01

## 2021-01-01 RX ORDER — POLYETHYLENE GLYCOL 3350 17 G/17G
17 POWDER, FOR SOLUTION ORAL DAILY PRN
Status: DISCONTINUED | OUTPATIENT
Start: 2021-01-01 | End: 2021-01-01

## 2021-01-01 RX ORDER — HEPARIN SODIUM 5000 [USP'U]/ML
5000 INJECTION, SOLUTION INTRAVENOUS; SUBCUTANEOUS EVERY 8 HOURS SCHEDULED
Status: DISCONTINUED | OUTPATIENT
Start: 2021-01-01 | End: 2021-01-19 | Stop reason: HOSPADM

## 2021-01-01 RX ORDER — POLYETHYLENE GLYCOL 3350 17 G/17G
17 POWDER, FOR SOLUTION ORAL DAILY PRN
Status: DISCONTINUED | OUTPATIENT
Start: 2021-01-01 | End: 2021-01-19 | Stop reason: HOSPADM

## 2021-01-01 RX ORDER — HEPARIN SODIUM 5000 [USP'U]/ML
5000 INJECTION, SOLUTION INTRAVENOUS; SUBCUTANEOUS EVERY 8 HOURS SCHEDULED
Status: DISCONTINUED | OUTPATIENT
Start: 2021-01-01 | End: 2021-01-01

## 2021-01-01 RX ORDER — LORAZEPAM 2 MG/ML
2 INJECTION INTRAMUSCULAR
Status: DISCONTINUED | OUTPATIENT
Start: 2021-01-01 | End: 2021-01-01 | Stop reason: HOSPADM

## 2021-01-01 RX ORDER — ACETAMINOPHEN 650 MG/1
650 SUPPOSITORY RECTAL EVERY 6 HOURS PRN
Status: DISCONTINUED | OUTPATIENT
Start: 2021-01-01 | End: 2021-01-01

## 2021-01-01 RX ORDER — PROMETHAZINE HYDROCHLORIDE 25 MG/1
25 SUPPOSITORY RECTAL EVERY 4 HOURS PRN
Status: DISCONTINUED | OUTPATIENT
Start: 2021-01-01 | End: 2021-01-19 | Stop reason: HOSPADM

## 2021-01-01 RX ORDER — MORPHINE SULFATE 2 MG/ML
2 INJECTION, SOLUTION INTRAMUSCULAR; INTRAVENOUS
Status: DISCONTINUED | OUTPATIENT
Start: 2021-01-01 | End: 2021-01-01 | Stop reason: HOSPADM

## 2021-01-01 RX ORDER — SODIUM CHLORIDE 0.9 % (FLUSH) 0.9 %
10 SYRINGE (ML) INJECTION EVERY 12 HOURS SCHEDULED
Status: DISCONTINUED | OUTPATIENT
Start: 2021-01-01 | End: 2021-01-01 | Stop reason: HOSPADM

## 2021-01-01 RX ORDER — LORAZEPAM 2 MG/ML
1 INJECTION INTRAMUSCULAR
Status: DISCONTINUED | OUTPATIENT
Start: 2021-01-01 | End: 2021-01-01 | Stop reason: HOSPADM

## 2021-01-01 RX ORDER — LACTULOSE 10 G/15ML
20 SOLUTION ORAL 3 TIMES DAILY
Qty: 2700 ML | Refills: 0 | Status: ON HOLD | OUTPATIENT
Start: 2021-01-01 | End: 2021-01-01

## 2021-01-01 RX ORDER — ACETAMINOPHEN 650 MG/1
650 SUPPOSITORY RECTAL EVERY 6 HOURS PRN
Status: DISCONTINUED | OUTPATIENT
Start: 2021-01-01 | End: 2021-01-19 | Stop reason: HOSPADM

## 2021-01-01 RX ORDER — SODIUM CHLORIDE 0.9 % (FLUSH) 0.9 %
10 SYRINGE (ML) INJECTION PRN
Status: DISCONTINUED | OUTPATIENT
Start: 2021-01-01 | End: 2021-01-01 | Stop reason: HOSPADM

## 2021-01-01 RX ORDER — LORAZEPAM 2 MG/ML
1 INJECTION INTRAMUSCULAR ONCE
Status: COMPLETED | OUTPATIENT
Start: 2021-01-01 | End: 2021-01-01

## 2021-01-01 RX ORDER — DEXTROSE MONOHYDRATE 50 MG/ML
100 INJECTION, SOLUTION INTRAVENOUS PRN
Status: DISCONTINUED | OUTPATIENT
Start: 2021-01-01 | End: 2021-01-01

## 2021-01-01 RX ORDER — LORAZEPAM 2 MG/ML
1 INJECTION INTRAMUSCULAR EVERY 6 HOURS PRN
Status: DISCONTINUED | OUTPATIENT
Start: 2021-01-01 | End: 2021-01-19 | Stop reason: HOSPADM

## 2021-01-01 RX ORDER — 0.9 % SODIUM CHLORIDE 0.9 %
1000 INTRAVENOUS SOLUTION INTRAVENOUS
Status: ACTIVE | OUTPATIENT
Start: 2021-01-01 | End: 2021-01-01

## 2021-01-01 RX ORDER — ACETAMINOPHEN 325 MG/1
650 TABLET ORAL EVERY 6 HOURS PRN
Status: DISCONTINUED | OUTPATIENT
Start: 2021-01-01 | End: 2021-01-19 | Stop reason: HOSPADM

## 2021-01-01 RX ORDER — LEVOTHYROXINE SODIUM 0.05 MG/1
50 TABLET ORAL DAILY
Status: DISCONTINUED | OUTPATIENT
Start: 2021-01-01 | End: 2021-01-01

## 2021-01-01 RX ORDER — DEXTROSE MONOHYDRATE 25 G/50ML
12.5 INJECTION, SOLUTION INTRAVENOUS PRN
Status: DISCONTINUED | OUTPATIENT
Start: 2021-01-01 | End: 2021-01-01 | Stop reason: SDUPTHER

## 2021-01-01 RX ORDER — DEXMEDETOMIDINE HYDROCHLORIDE 4 UG/ML
0.2 INJECTION, SOLUTION INTRAVENOUS CONTINUOUS
Status: DISCONTINUED | OUTPATIENT
Start: 2021-01-01 | End: 2021-01-01

## 2021-01-01 RX ORDER — HEPARIN SODIUM 1000 [USP'U]/ML
1000 INJECTION, SOLUTION INTRAVENOUS; SUBCUTANEOUS
Status: ACTIVE | OUTPATIENT
Start: 2021-01-01 | End: 2021-01-01

## 2021-01-01 RX ORDER — ONDANSETRON 2 MG/ML
4 INJECTION INTRAMUSCULAR; INTRAVENOUS EVERY 6 HOURS PRN
Status: DISCONTINUED | OUTPATIENT
Start: 2021-01-01 | End: 2021-01-01 | Stop reason: HOSPADM

## 2021-01-01 RX ADMIN — SODIUM BICARBONATE: 84 INJECTION, SOLUTION INTRAVENOUS at 06:50

## 2021-01-01 RX ADMIN — CEFEPIME HYDROCHLORIDE 2 G: 2 INJECTION, POWDER, FOR SOLUTION INTRAVENOUS at 04:23

## 2021-01-01 RX ADMIN — MORPHINE SULFATE 4 MG: 4 INJECTION, SOLUTION INTRAMUSCULAR; INTRAVENOUS at 07:43

## 2021-01-01 RX ADMIN — SODIUM POLYSTYRENE SULFONATE 15 G: 15 SUSPENSION ORAL; RECTAL at 08:58

## 2021-01-01 RX ADMIN — METRONIDAZOLE 500 MG: 500 INJECTION, SOLUTION INTRAVENOUS at 05:44

## 2021-01-01 RX ADMIN — DOXYCYCLINE 100 MG: 100 INJECTION, POWDER, LYOPHILIZED, FOR SOLUTION INTRAVENOUS at 08:20

## 2021-01-01 RX ADMIN — FENTANYL CITRATE 75 MCG/HR: 50 INJECTION, SOLUTION INTRAMUSCULAR; INTRAVENOUS at 06:45

## 2021-01-01 RX ADMIN — CEFEPIME HYDROCHLORIDE 2 G: 2 INJECTION, POWDER, FOR SOLUTION INTRAVENOUS at 15:04

## 2021-01-01 RX ADMIN — LACTULOSE 20 G: 20 SOLUTION ORAL at 15:35

## 2021-01-01 RX ADMIN — FAMOTIDINE 20 MG: 10 INJECTION, SOLUTION INTRAVENOUS at 15:35

## 2021-01-01 RX ADMIN — LACTULOSE 20 G: 20 SOLUTION ORAL at 09:07

## 2021-01-01 RX ADMIN — LORAZEPAM 2 MG: 2 INJECTION, SOLUTION INTRAMUSCULAR; INTRAVENOUS at 15:51

## 2021-01-01 RX ADMIN — ENOXAPARIN SODIUM 40 MG: 40 INJECTION SUBCUTANEOUS at 09:06

## 2021-01-01 RX ADMIN — LORAZEPAM 1 MG: 2 INJECTION, SOLUTION INTRAMUSCULAR; INTRAVENOUS at 15:44

## 2021-01-01 RX ADMIN — Medication 10 ML: at 09:07

## 2021-01-01 RX ADMIN — LORAZEPAM 2 MG: 2 INJECTION, SOLUTION INTRAMUSCULAR; INTRAVENOUS at 11:56

## 2021-01-01 RX ADMIN — OXYCODONE HYDROCHLORIDE 5 MG: 5 TABLET ORAL at 06:39

## 2021-01-01 RX ADMIN — SODIUM CHLORIDE, PRESERVATIVE FREE 10 ML: 5 INJECTION INTRAVENOUS at 22:51

## 2021-01-01 RX ADMIN — LORAZEPAM 2 MG: 2 INJECTION, SOLUTION INTRAMUSCULAR; INTRAVENOUS at 18:50

## 2021-01-01 RX ADMIN — SODIUM CHLORIDE, PRESERVATIVE FREE 10 ML: 5 INJECTION INTRAVENOUS at 07:43

## 2021-01-01 RX ADMIN — DEXTROSE MONOHYDRATE 25 G: 25 INJECTION, SOLUTION INTRAVENOUS at 09:10

## 2021-01-01 RX ADMIN — METRONIDAZOLE 500 MG: 500 INJECTION, SOLUTION INTRAVENOUS at 15:47

## 2021-01-01 RX ADMIN — LACTULOSE 20 G: 20 SOLUTION ORAL at 22:50

## 2021-01-01 RX ADMIN — SODIUM CHLORIDE, PRESERVATIVE FREE 10 ML: 5 INJECTION INTRAVENOUS at 21:45

## 2021-01-01 RX ADMIN — DOXYCYCLINE 100 MG: 100 INJECTION, POWDER, LYOPHILIZED, FOR SOLUTION INTRAVENOUS at 22:50

## 2021-01-01 RX ADMIN — SODIUM CHLORIDE: 9 INJECTION, SOLUTION INTRAVENOUS at 15:08

## 2021-01-01 RX ADMIN — Medication 1 TABLET: at 09:06

## 2021-01-01 RX ADMIN — FENTANYL CITRATE 50 MCG/HR: 50 INJECTION, SOLUTION INTRAMUSCULAR; INTRAVENOUS at 03:24

## 2021-01-01 RX ADMIN — MORPHINE SULFATE 2 MG: 2 INJECTION, SOLUTION INTRAMUSCULAR; INTRAVENOUS at 15:51

## 2021-01-01 RX ADMIN — MORPHINE SULFATE 4 MG: 4 INJECTION, SOLUTION INTRAMUSCULAR; INTRAVENOUS at 11:56

## 2021-01-01 RX ADMIN — DOXYCYCLINE 100 MG: 100 INJECTION, POWDER, LYOPHILIZED, FOR SOLUTION INTRAVENOUS at 06:27

## 2021-01-01 RX ADMIN — METRONIDAZOLE 500 MG: 500 INJECTION, SOLUTION INTRAVENOUS at 07:13

## 2021-01-01 RX ADMIN — SODIUM CHLORIDE, PRESERVATIVE FREE 10 ML: 5 INJECTION INTRAVENOUS at 21:51

## 2021-01-01 RX ADMIN — INSULIN HUMAN 10 UNITS: 100 INJECTION, SOLUTION PARENTERAL at 08:58

## 2021-01-01 RX ADMIN — SODIUM BICARBONATE 50 MEQ: 84 INJECTION, SOLUTION INTRAVENOUS at 08:58

## 2021-01-01 RX ADMIN — VANCOMYCIN HYDROCHLORIDE 750 MG: 750 INJECTION, POWDER, LYOPHILIZED, FOR SOLUTION INTRAVENOUS at 05:49

## 2021-01-01 RX ADMIN — DEXTROSE MONOHYDRATE 4 MCG/MIN: 50 INJECTION, SOLUTION INTRAVENOUS at 06:50

## 2021-01-01 RX ADMIN — SODIUM CHLORIDE: 9 INJECTION, SOLUTION INTRAVENOUS at 21:50

## 2021-01-01 RX ADMIN — LORAZEPAM 1 MG: 2 INJECTION, SOLUTION INTRAMUSCULAR; INTRAVENOUS at 04:09

## 2021-01-01 RX ADMIN — MORPHINE SULFATE 2 MG: 2 INJECTION, SOLUTION INTRAMUSCULAR; INTRAVENOUS at 10:29

## 2021-01-01 RX ADMIN — Medication: at 18:22

## 2021-01-01 RX ADMIN — SODIUM BICARBONATE: 84 INJECTION, SOLUTION INTRAVENOUS at 06:20

## 2021-01-01 RX ADMIN — FAMOTIDINE 20 MG: 10 INJECTION, SOLUTION INTRAVENOUS at 09:37

## 2021-01-01 RX ADMIN — METRONIDAZOLE 500 MG: 500 INJECTION, SOLUTION INTRAVENOUS at 22:51

## 2021-01-01 RX ADMIN — CEFEPIME HYDROCHLORIDE 1 G: 1 INJECTION, POWDER, FOR SOLUTION INTRAMUSCULAR; INTRAVENOUS at 21:50

## 2021-01-01 RX ADMIN — LORAZEPAM 2 MG: 2 INJECTION, SOLUTION INTRAMUSCULAR; INTRAVENOUS at 10:29

## 2021-01-01 ASSESSMENT — PAIN SCALES - GENERAL
PAINLEVEL_OUTOF10: 5
PAINLEVEL_OUTOF10: 0
PAINLEVEL_OUTOF10: 0

## 2021-01-01 ASSESSMENT — PULMONARY FUNCTION TESTS
PIF_VALUE: 23
PIF_VALUE: 22
PIF_VALUE: 24
PIF_VALUE: 17
PIF_VALUE: 22
PIF_VALUE: 21

## 2021-01-01 ASSESSMENT — PAIN DESCRIPTION - PROGRESSION: CLINICAL_PROGRESSION: NOT CHANGED

## 2021-01-01 NOTE — CARE COORDINATION
CASE MANAGEMENT DISCHARGE SUMMARY      Discharge to: [facility]     covid neg    Precertification completed:   Hospital Exemption Notification (HENS) completed: yes    New Durable Medical Equipment ordered/agency: none    Transportation:    Family/car:   Medical Transport explained to [employer]. Pt/family voice no agency preference. [facility]   time:   Ambulance form completed: Yes    Confirmed discharge plan with:     Patient: yes     Facility/Agency, name:  [facility] faxed [phone]   Phone number for report to facility: [phone]     RN, name: Antonia Whitt    Note: Discharging nurse to complete ERLIN, reconcile [facility], and place final copy with patient's discharge packet. RN to ensure that written prescriptions for  Level II medications are sent with patient to the facility as per protocol.       Krista Figueroa RN

## 2021-01-01 NOTE — CARE COORDINATION
Chart reviewed. Spoke with Bam Reid at [employer]. Stated needs rapid covid resulted prior to d/c. Transportation arranged per [facility] with [facility] . Minnie Varghese RN updated. Need d/c order and completed ERLIN prior to transport.  Anisha Wilks RN

## 2021-01-01 NOTE — PROGRESS NOTES
Endocarditis - acute bacterial w/ Staph likely 2nd to hx IVDU. W/ septic pulmonary emboli due to  R-sided endocarditis. Vancomycin started 23 Dec. Echo 24 Dec w/ preserved EF 55% and large mobile vegetation on tricuspid valve. Infectious Disease consulted and appreciated s/p PICC placed 26 Dec     Acute cystitis - potentially bacteremic spread.  High dose Ceftriaxone initially started 26 Dec to cover gram negatives.  F/u urine culture NGTD.      Sepsis - present on arrival, due to above. IVFs, trended lacate. Hyperbilirubinemia and thrombocytopenia are thought to be manifestations of the sepsis.  Monitored.      Back pain - seems more pleuritic than spinal.  He is at risk for discitis or epidural abscess.  but MRI 26 Dec w/out evidence of same.  He does not have any symptoms of cord compression, and he is already committed to extended course of IV ABX 2nd to above.     HypoNatremia - etiology clinically unable to determine but likely hypovolemic. Follow serial labs on IVF. Reviewed and documented as above. Troponin elevation - of unclear clinical significance w/ etiology clinically unable to determine, w/out signs/sxs of active ischemia. Followed serial troponins - downtrended. Reviewed and documented as above.     Transaminitis - w/ hyperbilirubinemia. GI consulted and appreciated w/ hx of chronic HBV/HCV. MRI abdomen 26 Dec w/o biliary obstruction. Will continue to follow serial labs. Reviewed and documented as above.     Hepatic encephalopathy - per GI note, (Stage 1) in setting of acute infective endocarditis. Started Lactulose and resolved - continue.      Anemia - etiology clinically unable to determine, w/out evidence of active bleeding/hemolysis. Stable and asymptomatic w/out indication for transfusion. Follow serial labs.   Reviewed and documented as above.       DVT Prophylaxis: LMWH  Diet: DIET LOW SODIUM 2 GM;

## 2021-01-02 NOTE — DISCHARGE SUMMARY
Hospital Medicine Discharge Summary    Patient ID: eBlen Koehler      Patient's PCP: No primary care provider on file. Admit Date: 12/23/2020     Discharge Date: 1/1/2021      Admitting Physician: Alyse Reyes MD     Discharge Physician: Atiya Lacey MD     Discharge Diagnoses: Active Hospital Problems    Diagnosis    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) ([facility] [B19.20, K74.69]    Hepatic encephalopathy ([facility] [address] [facility] [K72.90]    Septic embolism ([facility] [address] Stefanie Conti    Elevated liver enzymes [R74.8]    Chronic hepatitis C without hepatic coma (HCC) [B18.2]    Acute septic pulmonary embolus without acute cor pulmonale (HCC) [I26.90]       The patient was seen and examined on day of discharge and this discharge summary is in conjunction with any daily progress note from day of discharge. Hospital Course:       Endocarditis - acute bacterial w/ Staph likely 2nd to hx IVDU. W/ septic pulmonary emboli due to  R-sided endocarditis. Vancomycin started 23 Dec. Echo 24 Dec w/ preserved EF 55% and large mobile vegetation on tricuspid valve. Infectious Disease consulted and appreciated s/p PICC placed 26 Dec     Acute cystitis - potentially bacteremic spread.  High dose Ceftriaxone initially started 26 Dec to cover gram negatives.  F/u urine culture NGTD.      Sepsis - present on arrival, due to above. IVFs, trended lacate. Hyperbilirubinemia and thrombocytopenia are thought to be manifestations of the sepsis.  Monitored.      Back pain - seems more pleuritic than spinal.  He is at risk for discitis or epidural abscess.  but MRI 26 Dec w/out evidence of same.  He does not have any symptoms of cord compression, and he is already committed to extended course of IV ABX 2nd to above.     HypoNatremia - etiology clinically unable to determine but likely hypovolemic. Followed serial labs on IVF.  .    Troponin elevation - of unclear clinical significance w/ etiology clinically unable to determine, w/out signs/sxs of active ischemia. Followed serial troponins - downtrended.      Transaminitis - w/ hyperbilirubinemia. GI consulted and appreciated w/ hx of chronic HBV/HCV. MRI abdomen 26 Dec w/o biliary obstruction.       Hepatic encephalopathy - per GI note, (Stage 1) in setting of acute infective endocarditis. Started Lactulose and resolved - continue.      Anemia - etiology clinically unable to determine, w/out evidence of active bleeding/hemolysis. Stable and asymptomatic w/out indication for transfusion.          Labs: For convenience and continuity at follow-up the following most recent labs are provided:      CBC:    Lab Results   Component Value Date    WBC 21.4 01/01/2021    HGB 7.5 01/01/2021    HCT 23.4 01/01/2021     01/01/2021       Renal:    Lab Results   Component Value Date     01/01/2021    K 4.3 01/01/2021    K 4.2 12/24/2020     01/01/2021    CO2 15 01/01/2021    BUN 49 01/01/2021    CREATININE 1.1 01/01/2021    CALCIUM 8.2 01/01/2021    PHOS 5.0 01/01/2021         Significant Diagnostic Studies    Radiology:   IR PICC WO SQ PORT/PUMP > 5 YEARS   Final Result   Successful placement of PICC line. MRI ABDOMEN W WO CONTRAST MRCP   Final Result   Suboptimal post-contrast imaging of the abdomen, as gadolinium contrast   material had already been administered for lumbar spine MRI. 1. No biliary obstruction. 2. Faintly nodular contour of the liver. Recommend clinical correlation for   diffuse liver disease. 3. New small volume ascites. 4. Mild splenomegaly of uncertain etiology. MRI LUMBAR SPINE W WO CONTRAST   Final Result   1. Minimal edema is seen between the L4-L5 and L5-S1 spinous processes with   edema in the posterior paraspinal musculature spanning the L4 through S1   levels.   Unclear whether this is related to a prior injury or perhaps related to early Baastrup's disease. 2. Degenerative changes contribute to neural foraminal narrowing at L4-L5 and   L5-S1.   3. No significant spinal canal stenosis. CT CHEST PULMONARY EMBOLISM W CONTRAST   Final Result   Findings most compatible with diffuse septic emboli. Superimposed bilateral   lower lobe airspace opacities. No pulmonary arterial filling defect. Multiple prominent mediastinal lymph nodes, likely reactive. CT ABDOMEN PELVIS W IV CONTRAST Additional Contrast? None   Final Result   No acute abnormality detected within the abdomen and pelvis. Moderate splenomegaly. Absent right kidney. CT LUMBAR SPINE TRAUMA RECONSTRUCTION   Final Result   Unremarkable non-contrast CT of the lumbar spine. CT HEAD WO CONTRAST   Final Result   No acute intracranial abnormality. Mild chronic appearing sphenoethmoid sinus mucosal disease. CT CERVICAL SPINE WO CONTRAST   Final Result   Multilevel degenerative changes with no acute abnormality of the cervical   spine. Irregular cavitary nodules at the bilateral lung apices. The CTA of the   chest has been scheduled. XR CHEST PORTABLE   Final Result   1. Patchy bibasilar airspace disease concerning for multifocal pneumonia   rather than atelectasis. 2. 1.5 cm rounded right upper lobe pulmonary nodule. Recommend CT of the   chest with contrast for further evaluation. Consults:     IP CONSULT TO HOSPITALIST  IP CONSULT TO PHARMACY  IP CONSULT TO INFECTIOUS DISEASES  IP CONSULT TO GI    Disposition: SNF     Condition at Discharge: Stable    Discharge Instructions/Follow-up:  w/ PCP 1-2 weeks and subspecialists as arranged.      Code Status:  Full Code    Activity: activity as tolerated    Diet: regular diet      Discharge Medications:     Discharge Medication List as of 1/1/2021 12:26 PM           Details oxyCODONE (ROXICODONE) 5 MG immediate release tablet Take 1 tablet by mouth every 6 hours as needed for Pain for up to 7 days. , Disp-28 tablet, R-0Print      traZODone (DESYREL) 50 MG tablet Take 1 tablet by mouth nightly as needed for Sleep, Disp-7 tablet, R-0Print      lactulose (CHRONULAC) 10 GM/15ML solution Take 30 mLs by mouth 3 times daily, Disp-2700 mL, R-0Print      nicotine (NICODERM CQ) 14 MG/24HR Place 1 patch onto the skin every 24 hours for 14 days, Disp-14 patch, R-0Print             Time Spent on discharge is more than 30 minutes in the examination, evaluation, counseling and review of medications and discharge plan.       Signed:    Shaila Licea MD   1/2/2021

## 2021-01-13 NOTE — TELEPHONE ENCOUNTER
Called [facility] for F/U to imaging results 12/23/2020 Chest Xray . Pt was admitedd to [facility] 1/6/2021 ICU with critical lab results and is still in patient.

## 2021-01-14 PROBLEM — I38 ENDOCARDITIS: Status: ACTIVE | Noted: 2021-01-01

## 2021-01-15 PROBLEM — E03.9 HYPOTHYROIDISM: Status: ACTIVE | Noted: 2021-01-01

## 2021-01-15 PROBLEM — R91.8 PULMONARY INFILTRATES: Status: ACTIVE | Noted: 2021-01-01

## 2021-01-15 PROBLEM — N17.9 AKI (ACUTE KIDNEY INJURY) (HCC): Status: ACTIVE | Noted: 2021-01-01

## 2021-01-15 PROBLEM — A41.9 SEPTIC SHOCK (HCC): Status: ACTIVE | Noted: 2021-01-01

## 2021-01-15 PROBLEM — R65.21 SEPTIC SHOCK (HCC): Status: ACTIVE | Noted: 2021-01-01

## 2021-01-15 PROBLEM — D64.9 NORMOCYTIC NORMOCHROMIC ANEMIA: Status: ACTIVE | Noted: 2021-01-01

## 2021-01-15 NOTE — CARE COORDINATION
Writer aware pt currently on vent. Call placed to first contact Noah Conn at [phone] without answer and  listed a Lis Balbinas. Call placed and message left for Fernanda Duarte at [phone] and  generic message as it was unmarked . Awaiting returned call. CM notes that pt had been at [facility] as of 1/1/21. Call placed to them to see if patient had still been with them prior to admission. Spoke to Reyna Altamirano who stated that they had sent pt to [facility] and were unaware of his transfer to us. Now aware. They will follow for possible readmission however they are discharge dependent due to covid restrictions. Reyna Altamirano did provide an alternate number for Aparna Slater of [phone] and that number is no longer in service. CM will continue to follow.   Marlon James RN

## 2021-01-15 NOTE — CONSULTS
Pharmacy Consult: Renal Dose Adjust Medications    Cefepime 2g IVPB q12h  Vancomycin currently being managed by levels  Recommend Heparin vs Enoxaparin for VTE prophylaxis  Famotidine 20mg IVP daily    Liya Nicole PharmD, BCPS   1/15/2021 3:13 PM

## 2021-01-15 NOTE — CONSULTS
Infectious Diseases   Consult Note      Reason for Consult:  MRSA TVE, MOF    Requesting Physician:  Dr. Roxy Martin       Date of Admission: 1/15/2021  Subjective:   CHIEF COMPLAINT:  None given       HPI:    Anant Golden is a 61yoM with history of IVDU, HBV/HCV infection with cirrhosis and ascites, R nephrectomy.     Admission 12/23-1/1/21 - TVE and septic pulmonary emboli with MRSA being the etiologic agent. He was ultimately discharged to SNF to complete a course of IV vanc with planned end date 2/11/21. Admitted to outside hospital 1/5/21 - sent for evaluation of anemia, hgb 6.9, sepsis picture. Records provided are incomplete. He has been treated there for widely disseminated MRSA infection with sepsis and MOF. Admission BC there were positive for MRSA despite the IV vanc previously given. Transferred to [facility] for management and CTS consult. He is intubated. Data reviewed,   Initial echo there 1/6/21 - no vegetation seen  Had LP, paracentesis - results not available   Empyema, moderate effusion R, small L.  R VATS and thoracotomy 1/7/21. Chest tube x2 in place. MRI with osteodiscitis C4-C7, possible OM T6  Changes of cirrhosis with ascites with \"numerous hypervascular areas throughout the liver related to PV shunting vs primary liver lesions vs mets  TTE 1/13/21 - 2.3x1cm TV vegetation with mod TR    He was reintubated 1/12/21. Has R fem TLC in place, reportedly placed 1/7/21. Today, WBC 8.1, BUN 81, cr 2.5, K 5.9, CO2 22  Anuric. No diarrhea. CRRT considered. On low rate levophed.                       Current abx:  Cefepime 2g q12  Doxy 100 IV q12   Flagyl 500 IV q8   vanc by level        Past Surgical History:       Diagnosis Date    Back pain     Hepatitis B surface antigen positive 08/31/2017    Hepatitis C antibody positive in blood 08/31/2017    MRSA (methicillin resistant staph aureus) culture positive 12/25/2020    bactermia         Procedure Laterality Date  KIDNEY REMOVAL Right     WRIST SURGERY         Social History:    TOBACCO:   reports that he has been smoking cigarettes. He has been smoking about 1.00 pack per day. He has never used smokeless tobacco.  ETOH:   reports no history of alcohol use. Family History:   No family history on file. There is no family history of autoimmune diseases or significant infectious diseases.       Current Medications:    Current Facility-Administered Medications: sodium chloride flush 0.9 % injection 10 mL, 10 mL, Intravenous, 2 times per day  sodium chloride flush 0.9 % injection 10 mL, 10 mL, Intravenous, PRN  promethazine (PHENERGAN) tablet 12.5 mg, 12.5 mg, Oral, Q6H PRN **OR** ondansetron (ZOFRAN) injection 4 mg, 4 mg, Intravenous, Q6H PRN  polyethylene glycol (GLYCOLAX) packet 17 g, 17 g, Oral, Daily PRN  acetaminophen (TYLENOL) tablet 650 mg, 650 mg, Oral, Q6H PRN **OR** acetaminophen (TYLENOL) suppository 650 mg, 650 mg, Rectal, Q6H PRN  enoxaparin (LOVENOX) injection 40 mg, 40 mg, Subcutaneous, Daily  norepinephrine (LEVOPHED) 16 mg in dextrose 5 % 250 mL infusion, 10 mcg/min, Intravenous, Continuous  vasopressin 20 Units in dextrose 5 % 100 mL infusion, 0.03 Units/min, Intravenous, Continuous  cefepime (MAXIPIME) 2 g IVPB minibag, 2 g, Intravenous, Q12H  metronidazole (FLAGYL) 500 mg in NaCl 100 mL IVPB premix, 500 mg, Intravenous, Q8H  doxycycline (VIBRAMYCIN) 100 mg in dextrose 5 % 100 mL IVPB, 100 mg, Intravenous, Q12H  sodium bicarbonate 150 mEq in dextrose 5 % 1,000 mL infusion, , Intravenous, Continuous  fentaNYL (SUBLIMAZE) 1,000 mcg in sodium chloride 0.9 % 100 mL infusion, 25 mcg/hr, Intravenous, Continuous  dexmedetomidine (PRECEDEX) 400 mcg in sodium chloride 0.9 % 100 mL infusion, 0.2 mcg/kg/hr, Intravenous, Continuous  lactulose (CHRONULAC) 10 GM/15ML solution 20 g, 20 g, Per NG tube, TID  0.9 % sodium chloride bolus, 1,000 mL, Intravenous, Once insulin lispro (HUMALOG) injection vial 0-6 Units, 0-6 Units, Subcutaneous, Q4H  glucose (GLUTOSE) 40 % oral gel 15 g, 15 g, Oral, PRN  dextrose 50 % IV solution, 12.5 g, Intravenous, PRN  glucagon (rDNA) injection 1 mg, 1 mg, Intramuscular, PRN  dextrose 5 % solution, 100 mL/hr, Intravenous, PRN  famotidine (PEPCID) injection 20 mg, 20 mg, Intravenous, Daily  mupirocin (BACTROBAN) 2 % ointment, , Nasal, BID  vancomycin (VANCOCIN) intermittent dosing (placeholder), , Other, RX Placeholder  0.9 % sodium chloride bolus, 1,000 mL, Intravenous, Once PRN  prismaSATE BGK 4/2.5 dialysis solution, , Dialysis, Continuous  prismaSATE BGK 4/2.5 dialysis solution, , Dialysis, Continuous  prismaSATE BGK 4/2.5 dialysis solution, , Dialysis, Continuous  0.9 % sodium chloride infusion, , Intravenous, Continuous  potassium chloride 20 mEq/50 mL IVPB (Central Line), 20 mEq, Intravenous, PRN  magnesium sulfate 1 g in dextrose 5% 100 mL IVPB, 1 g, Intravenous, PRN  calcium gluconate 1 g in sodium chloride 50 mL, 1 g, Intravenous, PRN **OR** calcium gluconate 2 g in dextrose 5 % 100 mL IVPB, 2 g, Intravenous, PRN **OR** calcium gluconate 3 g in dextrose 5 % 100 mL IVPB, 3 g, Intravenous, PRN **OR** calcium gluconate 4 g in dextrose 5 % 100 mL IVPB, 4 g, Intravenous, PRN  sodium phosphate 6 mmol in dextrose 5 % 250 mL IVPB, 6 mmol, Intravenous, PRN **OR** sodium phosphate 12 mmol in dextrose 5 % 250 mL IVPB, 12 mmol, Intravenous, PRN **OR** sodium phosphate 18 mmol in dextrose 5 % 500 mL IVPB, 18 mmol, Intravenous, PRN **OR** sodium phosphate 24 mmol in dextrose 5 % 500 mL IVPB, 24 mmol, Intravenous, PRN  heparin (porcine) injection 1,000 Units, 1,000 Units, Intracatheter, Once PRN      Allergies   Allergen Reactions    Codeine         REVIEW OF SYSTEMS:    Unable to obtain       Objective:   PHYSICAL EXAM: VITALS:  BP 98/69   Pulse 75   Temp 95.3 °F (35.2 °C) (Bladder)   Resp 18   Ht 5' 10\" (1.778 m)   Wt 170 lb 3.1 oz (77.2 kg)   SpO2 100%   BMI 24.42 kg/m²      24HR INTAKE/OUTPUT:      Intake/Output Summary (Last 24 hours) at 1/15/2021 1500  Last data filed at 1/15/2021 1400  Gross per 24 hour   Intake 595 ml   Output 50 ml   Net 545 ml     CONSTITUTIONAL:  Somewhat cachectic. Sedated, on vent via ETT  HEENT: NCAT, PERRL, sclera white, conjunctiva full. No conjunctival lesions   NECK:  Supple, symmetrical, trachea midline, no adenopathy  LUNGS:  no increased work of breathing. Coarse ventilated BS kiarra   CT R in place  CARDIOVASCULAR:  RRR   ABDOMEN:   Hypoactive bowel sounds. Soft, flat  PSYCHIATRIC: Oriented to person place and time. No obvious depression or anxiety. MUSCULOSKELETAL: No obvious misalignment or effusion of the joints. No clubbing, cyanosis of the digits. SKIN:  normal skin color, texture, turgor and no redness, warmth, or swelling. No palpable nodules or stigmata of embolic phenomenon  NEUROLOGIC: nonfocal exam  ACCESS:  R Fem TLC, R rad art line, PIV in place, sites ok   Gayle in place       DATA:    Old records have been reviewed    CBC:  Recent Labs     01/15/21  0630   WBC 8.1   RBC 2.23*   HGB 7.1*   HCT 22.7*   PLT see below   .7*   MCH 31.9   MCHC 31.3   RDW 22.3*      BMP:  Recent Labs     01/15/21  0630      K 5.9*   *   CO2 22   BUN 81*   CREATININE 2.5*   CALCIUM 8.6   GLUCOSE 106*        Cultures:   1/15/21 BC x2 NGTD       Radiology Review:  All pertinent images / reports were reviewed as a part of this visit. MRI brain 1/15/21  Impression   1. No acute intracranial abnormality.  Specifically, no acute infarction.    2. Mild parenchymal volume loss.  Minimal periventricular white matter signal   abnormality compatible with chronic microvascular ischemic changes.         CXR 1/15/21  Impression Persistent patchy multifocal nodular opacity within the mid to lower lung   zones which can reflect pneumonia in the appropriate clinical setting.  Small   pleural effusions. Assessment:     Patient Active Problem List   Diagnosis    Gastroenteritis    Nausea & vomiting    Leukocytosis    Marijuana smoker    Acute septic pulmonary embolus without acute cor pulmonale (HCC)    Septic embolism (HCC)    Elevated liver enzymes    Chronic hepatitis C without hepatic coma ([address]    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) ([address]    Hepatic encephalopathy ([address]    Endocarditis    Acute encephalopathy    Acute respiratory failure with hypoxia ([address]    HTN (hypertension), benign    IVDA (intravenous drug abuse) complicating pregnancy ([address]       Eron Gregorio is a 61yoM who is evaluated for the following:    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 He has evidence of widely disseminated infection with septic pulmonary emboli, empyema s/p R VATS, infection of the cervical and possibly thoracic spine. There are poorly characterized lesions in the liver that could be abscesses. Underlying cirrhosis attributed to chronic HBV/HCV infection  Evolving ABIOLA     Overall progonsis is grim  Await family decision re direction of care  If family wishes to continue to pursue aggressive care, would change to IV daptomycin + linezolid. Will need new CVC and removal of femoral line     Case d/w RN        Bruce Douglas M.D. Thank you for the opportunity to participate in the care of your patient.     Please do not hesitate to contact me:   [phone] office  [phone] mobile

## 2021-01-15 NOTE — CONSULTS
INPATIENT PULMONARY CRITICAL CARE CONSULT NOTE      Chief Complaint/Referring Provider:  Patient is being seen at the request of Dr. Carola Kasper for a consultation for Acute respiratory failure      Presenting HPI: Patient was transferred to [facility] ICU for endocarditis     60 y.o. male who presented to [facility] with past medical history of IV drug usage, right nephrectomy, cigarette smoking, cirrhosis presented as a transfer from [facility] in [address] [place] [address] to our facility for septic shock secondary to endocarditis, discitis, osteomyelitis, myositis, SBP. Reason for transfer is due to endocarditis requiring CT surgery which they do not have the service at their facility        Very difficult to gather from the note that was sent, patient is intubated and encephalopathic. After reviewing documentation patient apparently was admitted for encephalopathy and sepsis. Patient at the time had a CT head that was negative and then underwent a lumbar puncture with CSF studies sent. Patient hospitalization was complicated with septic shock,, osteodiscitis, bacteremia MRSA. Patient continued receiving antibiotic for multiple days and continues to be positive with blood cultures. Repeat echocardiogram was performed that showed a 2. 3x1 vegetation. Patient was subsequently transferred to our facility. No dc summary present as well. Patient when seen this morning was critically ill on mechanical ventilator support, patient was on 30% oxygen and PEEP of 5 to maintain oxygen saturation, patient was not on any IV sedation to maintain patient ventilator synchrony and patient was not fighting the vent, patient was not spiking any fever, patient was on IV pressors to maintain hemodynamics, patient's blood sugars were acceptable, patient had sinus rhythm on the monitor, patient was not making much urine, patient's cumulative fluid balance when seen this morning was +465 mL, patient has 2 chest tubes on the right side which were connected to suction when seen which were present, no other pertinent review of system could be obtained because of the patient's clinical status         Patient Active Problem List    Diagnosis Date Noted    ABIOLA (acute kidney injury) ([facility] 01/15/2021    Pulmonary infiltrates 01/15/2021    Normocytic normochromic anemia 01/15/2021    Septic shock ([address] 01/15/2021    Hypothyroidism 01/15/2021    Acute encephalopathy     Acute respiratory failure with hypoxia and hypercapnia ([address]     HTN (hypertension), benign     IVDA (intravenous drug abuse) complicating pregnancy ([address]     Endocarditis 01/14/2021    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) ([address]     Hepatic encephalopathy ([address]     Septic embolism ([address]     Elevated liver enzymes     Chronic hepatitis C without hepatic coma ([address]     Acute septic pulmonary embolus without acute cor pulmonale ([address] 12/23/2020    Gastroenteritis 09/01/2017    Nausea & vomiting 09/01/2017    Leukocytosis 09/01/2017    Marijuana smoker 09/01/2017       Past Medical History:   Diagnosis Date    Back pain     Hepatitis B surface antigen positive 08/31/2017    Hepatitis C antibody positive in blood 08/31/2017    MRSA (methicillin resistant staph aureus) culture positive 12/25/2020    bactermia        Past Surgical History:   Procedure Laterality Date  KIDNEY REMOVAL Right     WRIST SURGERY          No family history on file. Social History     Tobacco Use    Smoking status: Current Every Day Smoker     Packs/day: 1.00     Types: Cigarettes    Smokeless tobacco: Never Used   Substance Use Topics    Alcohol use: No        Allergies   Allergen Reactions    Codeine                Physical Exam:  Blood pressure 98/69, pulse 97, temperature 98.8 °F (37.1 °C), temperature source Bladder, resp. rate 18, height 5' 10\" (1.778 m), weight 170 lb 3.1 oz (77.2 kg), SpO2 99 %.'     Constitutional:  No acute distress on mechanical vent to support. HENT: Endotracheal tube present no thyromegaly. Eyes:  Conjunctivae are normal. Pupils equal, round, and reactive to light. No scleral icterus. Neck: . No tracheal deviation present. No obvious thyroid mass. Cardiovascular: Normal rate, regular rhythm, LLSB murmur. No right ventricular heave. No lower extremity edema. Pulmonary/Chest: No wheezes. Scattered rales. Chest wall is not dull to percussion. No accessory muscle usage or stridor. Decreased breath sound density  Abdominal: Soft. Bowel sounds present. No distension or hernia. No tenderness. Musculoskeletal: No cyanosis. No clubbing. No obvious joint deformity. Lymphadenopathy: No cervical or supraclavicular adenopathy. Skin: Skin is warm and dry. No rash or nodules on the exposed extremities. Neurologic: Encephalopathic        Results:  CBC:   Recent Labs     01/15/21  0630   WBC 8.1   HGB 7.1*   HCT 22.7*   .7*   PLT see below     BMP:   Recent Labs     01/15/21  0630      K 5.9*   *   CO2 22   PHOS 5.2*   BUN 81*   CREATININE 2.5*     LIVER PROFILE:   Recent Labs     01/15/21  0630   AST 41*   ALT 23   BILITOT 0.7   ALKPHOS 73     PT/INR:   Recent Labs     01/15/21  0630   PROTIME 26.3*   INR 2.25*       Imaging:  I have reviewed radiology images personally.     MRI BRAIN WO CONTRAST   Final Result 1. No acute intracranial abnormality. Specifically, no acute infarction. 2. Mild parenchymal volume loss. Minimal periventricular white matter signal   abnormality compatible with chronic microvascular ischemic changes. XR CHEST PORTABLE   Final Result   Persistent patchy multifocal nodular opacity within the mid to lower lung   zones which can reflect pneumonia in the appropriate clinical setting. Small   pleural effusions. Results for Etienne Tirado (MRN [id number]) as of 1/15/2021 19:07   Ref. Range 1/15/2021 08:53 1/15/2021 10:00   pH, Arterial Latest Ref Range: 7.350 - 7.450  7.198 (LL) 7.271 (L)   pCO2, Arterial Latest Ref Range: 35.0 - 45.0 mm Hg 57.7 (H) 49.5 (H)   pO2, Arterial Latest Ref Range: 75.0 - 108.0 mm Hg 131.7 (H) 103.2   HCO3, Arterial Latest Ref Range: 21.0 - 29.0 mmol/L 22.5 22.8   TCO2 (calc), Art Latest Ref Range: Not Established mmol/L 24 24   Base Excess, Arterial Latest Ref Range: -3 - 3  -6 (L) -4 (L)   O2 Sat, Arterial Latest Ref Range: 93 - 100 % 98 97     Results for Etienne Tirado (MRN [id number]) as of 1/15/2021 19:07   Ref.  Range 12/31/2020 05:15 1/1/2021 06:00 1/5/2021 00:00 1/15/2021 06:30   WBC Latest Ref Range: 4.0 - 11.0 K/uL 21.1 (H) 21.4 (H) 17.2 8.1   RBC Latest Ref Range: 4.20 - 5.90 M/uL 2.34 (L) 2.46 (L) 2.17 2.23 (L)   Hemoglobin Quant Latest Ref Range: 13.5 - 17.5 g/dL 7.2 (L) 7.5 (L) 6.9 (A) 7.1 (L)   Hematocrit Latest Ref Range: 40.5 - 52.5 % 21.6 (L) 23.4 (L) 21.2 (A) 22.7 (L)   MCV Latest Ref Range: 80.0 - 100.0 fL 92.6 95.3 97.6 101.7 (H)   MCH Latest Ref Range: 26.0 - 34.0 pg 30.7 30.6 32 31.9   MCHC Latest Ref Range: 31.0 - 36.0 g/dL 33.1 32.2 32.8 31.3   MPV Latest Ref Range: 5.0 - 10.5 fL 8.4 8.5 8.4 see below   RDW Latest Ref Range: 12.4 - 15.4 % 16.9 (H) 18.6 (H) 23.9 22.3 (H)   Platelet Count Latest Ref Range: 135 - 450 K/uL 173 164 124 see below   Neutrophils % Latest Units: % 87.0 84.8 80 77.8 Lymphocyte % Latest Units: % 4.0 9.7 12.7 14.1   Monocytes % Latest Units: % 4.0 4.5 5.3 6.6   Eosinophils % Latest Units: %   1      Staph aureus MRSA DNA Detected    Blood Culture, Routine Abnormal  12/25/2020  1:58 PM Wilder Paulson   See additional report for complete BCID panel. Organism Abnormal  12/25/2020  1:58 PM [address] [facility]   Staph aureus MRSA    Blood Culture, Routine Abnormal  12/25/2020  1:58 PM [address] for   CONTACT PRECAUTIONS INDICATED   Isolated one of two sets    Testing Performed By    Lab - Abbreviation Name Director Address Valid Date Range   [address]- [address] [facility] Mary Palumbo M.D. [address] [facility] [address] 09/26/20 0000-Present   Narrative  Performed by: [facility]  ORDER#: [id number]                          ORDERED BY: RHEA Campbell   SOURCE: Blood Hand, Left       Results for Steven Garza (MRN [id number]) as of 1/15/2021 19:07   Ref. Range 8/31/2017 21:37 12/26/2020 08:37 12/27/2020 11:30   Hep A IgM Latest Ref Range: Non-reactive  Non-reactive  Non-reactive   Hep A Total Ab Latest Ref Range: Negative   Positive (A) Positive (A)   Hep B S Ab Latest Units: mIU/mL  7.01    Hep B S Ag Interp Latest Ref Range: Non-reactive  Reactive (A) Reactive (A)    Hep C Ab Interp Latest Ref Range: Non-reactive  REACTIVE (A) REACTIVE (A)    Hep B Core Ab, IgM Latest Ref Range: Non-reactive  Non-reactive  Non-reactive   Hep B Core Total Ab Latest Ref Range: Negative   Positive (A)      CT chest in December 2020-  CTA OF THE CHEST 12/23/2020 4:01 pm       TECHNIQUE:   CTA of the chest was performed after the administration of intravenous   contrast.  Multiplanar reformatted images are provided for review.  MIP   images are provided for review.  Dose modulation, iterative reconstruction, and/or weight based adjustment of the mA/kV was utilized to reduce the   radiation dose to as low as reasonably achievable.       COMPARISON:   None.       HISTORY:   ORDERING SYSTEM PROVIDED HISTORY: chest pain, shortness of breath, IVDA   TECHNOLOGIST PROVIDED HISTORY:   Reason for exam:->chest pain, shortness of breath, IVDA   Reason for Exam: patient fell, center chest pain, sob   Acuity: Acute   Type of Exam: Initial       FINDINGS:   Pulmonary Arteries: Pulmonary arteries are adequately opacified for   evaluation.  No evidence of intraluminal filling defect to suggest pulmonary   embolism.  Main pulmonary artery is normal in caliber.       Mediastinum: Multiple prominent lymph nodes, likely reactive.  The heart and   pericardium demonstrate no acute abnormality.  There is no acute abnormality   of the thoracic aorta.       Lungs/pleura: There multiple ground-glass opacities of the lungs bilaterally. Additionally, there are peripheral nodular and cavitary knee agents   throughout the lungs bilaterally, most compatible with septic emboli.       Upper Abdomen: Limited images of the upper abdomen are unremarkable.       Soft Tissues/Bones: No acute bone or soft tissue abnormality.           Impression   Findings most compatible with diffuse septic emboli.  Superimposed bilateral   lower lobe airspace opacities. Echocardiogram:  Summary   Technically difficult examination due to patient immobility. Normal LV systolic function with a visually estimated EF of 55%. Endocardium not entirely well visualized but no obvious segmental wall   motion abnormalities. Normal left ventricular diastolic filling pressure. There is a large mobile vegetation attached to the tricuspid valve,   primarily on the atrial portion but appearing to swing into the right   ventricle. It measures approximately 1.3 by 1.1 cm. Mild tricuspid regurgitation. Systolic pulmonary artery pressure (SPAP) is normal and estimated at 27 mmHg   (right atrial pressure 3 mmHg). PFT: None in Epic     Results for sOcar Alcantara (MRN [id number]) as of 1/15/2021 19:07   Ref.  Range 1/5/2021 07:50 1/15/2021 06:30 1/15/2021 08:53   Sodium Latest Ref Range: 136 - 145 mmol/L 5.2 142    Potassium Latest Ref Range: 3.5 - 5.1 mmol/L 5.2 5.9 (H)    Chloride Latest Ref Range: 99 - 110 mmol/L 118 114 (H)    CO2 Latest Ref Range: 21 - 32 mmol/L 14 22    BUN Latest Ref Range: 7 - 20 mg/dL 47 81 (HH)    Creatinine Latest Ref Range: 0.9 - 1.3 mg/dL 1.3 2.5 (H)    Anion Gap Latest Ref Range: 3 - 16   6    Calcium, Ion Latest Ref Range: 1.12 - 1.32 mmol/L  1.24 1.35 (H)   GFR Non- Latest Ref Range: >60   27 (A)    GFR  Latest Ref Range: >60   32 (A)    Magnesium Latest Ref Range: 1.80 - 2.40 mg/dL  2.00    Lactic Acid Latest Ref Range: 0.4 - 2.0 mmol/L  0.7    Lactate, ser/plas Latest Ref Range: 0.40 - 2.00 mmol/L   0.63   Glucose Latest Ref Range: 70 - 99 mg/dL 96 106 (H)    POC Glucose Latest Ref Range: 70 - 99 mg/dl   110 (H)   Calcium Latest Ref Range: 8.3 - 10.6 mg/dL 7.9 8.6    Phosphorus Latest Ref Range: 2.5 - 4.9 mg/dL  5.2 (H)    Total Protein Latest Ref Range: 6.4 - 8.2 g/dL 7.2 6.2 (L)    POC Potassium Latest Ref Range: 3.5 - 5.1 mmol/L   5.9 (H)   Total CK Latest Ref Range: 39 - 308 U/L  36 (L)    LD Latest Ref Range: 100 - 190 U/L  131    Albumin Latest Ref Range: 3.4 - 5.0 g/dL 1.5 3.1 (L)    Globulin Latest Units: g/dL  3.1    Albumin/Globulin Ratio Latest Ref Range: 1.1 - 2.2   1.0 (L)    Alk Phos Latest Ref Range: 40 - 129 U/L 111 73    ALT Latest Ref Range: 10 - 40 U/L 75 23    AST Latest Ref Range: 15 - 37 U/L 134 41 (H)    Bilirubin Latest Ref Range: 0.0 - 1.0 mg/dL 1.7 (A) 0.7    TSH Latest Ref Range: 0.27 - 4.20 uIU/mL  10.81 (H)    T4 Free Latest Ref Range: 0.9 - 1.8 ng/dL  1.0      ONE XRAY VIEW OF THE CHEST       1/15/2021 6:03 am     COMPARISON:   12/23/2020       HISTORY:   ORDERING SYSTEM PROVIDED HISTORY: resp failure   TECHNOLOGIST PROVIDED HISTORY:   Reason for exam:->resp failure       FINDINGS:   Endotracheal tube extends to the mid trachea.  Nasogastric tube extends below   the diaphragm.  2 chest tubes are seen extending to the right apex.  No gross   pneumothorax.  Heterogeneous and nodular pulmonary opacity is again   demonstrated within the mid to lower lung zones.  Hazy opacity is seen near   the costophrenic angles.  Cardiac and mediastinal silhouettes are similar to   prior.           Impression   Persistent patchy multifocal nodular opacity within the mid to lower lung   zones which can reflect pneumonia in the appropriate clinical setting.  Small   pleural effusions. Assessment:  Active Problems:    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) (HCC)    Endocarditis    Acute encephalopathy    Acute respiratory failure with hypoxia and hypercapnia (HCC)    HTN (hypertension), benign    IVDA (intravenous drug abuse) complicating pregnancy ([address]    ABIOLA (acute kidney injury) ([address]    Pulmonary infiltrates    Normocytic normochromic anemia    Septic shock (HCC)    Hypothyroidism  Resolved Problems:    * No resolved hospital problems.  *          Plan:   Ventilatory support to keep saturation between 90 and 94%  Ventilator settings and waveforms reviewed  Ventilator changes made  Patient does not need any IV sedation to maintain patient ventilator synchrony at this time  Neurochecks  MRI of the head was done which was negative for any septic emboli or any other pathology which was acute  Patient has been started on cefepime doxycycline and vancomycin by the admitting provider which can be continued for now  Patient has history of MRSA bacteremia in the past  Patient continues to have vegetation on tricuspid valve  Chest tube care Patient was evaluated by nephrology and CRRT being contemplated but it may not change patient's prognosis which was discussed by nephrology with the family and patient's family wants to hold off on any renal replacement therapy for now  Pulmonary toilet  If patient's family wants to be aggressive then a bronchoscopy can be contemplated but will hold off for now  Will send ammonia level  Synthroid started as patient has elevated TSH levels-titration as per clinical status and repeat labs  Monitor H&H closely  If patient's hemoglobin drops below 7 patient may require packed RBC transfusion  Can be given bronchodilators on as needed basis  Blood glucose monitoring with sliding scale insulin  Patient is on lactulose which can be continued  Monitor input output and BMP  IV fluids and pressors to keep mean arterial pressure medicine 65 mmHg  Correct electrolytes and whenever necessary basis  PUD and DVT prophylaxis    Patient's overall clinical status is precarious and has potential for further decompensation  Patient's long-term prognosis is guarded  No CT surgery intervention being planned  Patient's family wants to hold off on any renal replacement therapy as discussed with nephrology    Case discussed with ICU team    Critical care time spent on the patient was 40 minutes exclusive of any procedures    Consider palliative care consult to establish the goals of care and CODE STATUS    Further management depending on patient's clinical status and the family's decision about course of care        Electronically signed by:  Fer Encarnacion MD    1/15/2021    7:15 PM.

## 2021-01-15 NOTE — CONSULTS
Estimated Creatinine Clearance: 27 mL/min (A) (based on SCr of 3.1 mg/dL (H)).   Vancomycin random level tomorrow AM  Sherley Liu, PharmARIA 1/16/2021  9:24 AM

## 2021-01-15 NOTE — PROGRESS NOTES
Spoke with Jared Shields via phone and she informed writer that family has decided to make him a DNR-CCA at this time until she can reach her sister and inform her of pt status. Plan will probably be to progress to withdraw of care and comfort care. Carlos Manuel Caro  Informed Dr. Sharda Denis. Will change code status.  Carlos Manuel Caro

## 2021-01-15 NOTE — CONSULTS
In patient Neurology consult        [facility] Neurology      MD Tony Michele Dinoranohemi  1965    Date of Service: 1/15/2021    Referring Physician: Anita David MD      Reason for the consult and CC: Acute encephalopathy    HPI:   Most of the history was obtained from chart reviewing and discussion with the patient's nurse as the patient is currently intubated and sedated. The patient is a 60y.o. years old male with multiple medical problems, history of IVDA, recent admission to the hospital with endocarditis, septic emboli and other multiorgan failure. He was discharged from this hospital recently and went to outside facility. He was transferred again yesterday from outside facility due to possible valve replacement. The patient is currently intubated and sedated. Most of the history was obtained from chart reviewing and discussion with his nurse. Patient had MRI of the brain today which showed no acute lesions. No witnessed seizure. No other relieving or aggravating factors. Degree of the encephalopathy has been severe and duration unclear. No other associated symptoms. Other review of system was limited    Family history noncontributory            Past Medical History:   Diagnosis Date    Back pain     Hepatitis B surface antigen positive 08/31/2017    Hepatitis C antibody positive in blood 08/31/2017    MRSA (methicillin resistant staph aureus) culture positive 12/25/2020    bactermia     Past Surgical History:   Procedure Laterality Date    KIDNEY REMOVAL Right     WRIST SURGERY       Social History     Tobacco Use    Smoking status: Current Every Day Smoker     Packs/day: 1.00     Types: Cigarettes    Smokeless tobacco: Never Used   Substance Use Topics    Alcohol use: No    Drug use: Yes     Types: IV, Cocaine, Opiates , Marijuana     Comment: on subutex now.      Allergies   Allergen Reactions    Codeine      Current Facility-Administered Medications Medication Dose Route Frequency Provider Last Rate Last Admin    sodium chloride flush 0.9 % injection 10 mL  10 mL Intravenous 2 times per day Jenene Duty A Tam, DO        sodium chloride flush 0.9 % injection 10 mL  10 mL Intravenous PRN Jenene Duty A Tam, DO        promethazine (PHENERGAN) tablet 12.5 mg  12.5 mg Oral Q6H PRN Ahmad A Tam, DO        Or    ondansetron (ZOFRAN) injection 4 mg  4 mg Intravenous Q6H PRN Ahmad A Tam, DO        polyethylene glycol (GLYCOLAX) packet 17 g  17 g Oral Daily PRN Daniel Masterszi, DO        acetaminophen (TYLENOL) tablet 650 mg  650 mg Oral Q6H PRN Ahmad A Tam, DO        Or    acetaminophen (TYLENOL) suppository 650 mg  650 mg Rectal Q6H PRN Ahmad A Tam, DO        enoxaparin (LOVENOX) injection 40 mg  40 mg Subcutaneous Daily Ahmad A Tam, DO        norepinephrine (LEVOPHED) 16 mg in dextrose 5 % 250 mL infusion  10 mcg/min Intravenous Continuous Ahmad A Tam, DO 3.8 mL/hr at 01/15/21 0650 4 mcg/min at 01/15/21 0650    vasopressin 20 Units in dextrose 5 % 100 mL infusion  0.03 Units/min Intravenous Continuous Ahmad A Tam, DO        cefepime (MAXIPIME) 2 g IVPB minibag  2 g Intravenous Q12H Ahmad A Tam, DO        metronidazole (FLAGYL) 500 mg in NaCl 100 mL IVPB premix  500 mg Intravenous Q8H Ahmad A Tam, DO   Stopped at 01/15/21 0815    doxycycline (VIBRAMYCIN) 100 mg in dextrose 5 % 100 mL IVPB  100 mg Intravenous Q12H Ahmad A Tam, DO   Stopped at 01/15/21 0920    sodium bicarbonate 150 mEq in dextrose 5 % 1,000 mL infusion   Intravenous Continuous Ahmad A Tam, DO 50 mL/hr at 01/15/21 0650 New Bag at 01/15/21 0650    fentaNYL (SUBLIMAZE) 1,000 mcg in sodium chloride 0.9 % 100 mL infusion  25 mcg/hr Intravenous Continuous Ahmad A Tam, DO   Stopped at 01/15/21 0908    dexmedetomidine (PRECEDEX) 400 mcg in sodium chloride 0.9 % 100 mL infusion  0.2 mcg/kg/hr Intravenous Continuous Stepan Turcios DO  lactulose (CHRONULAC) 10 GM/15ML solution 20 g  20 g Per NG tube TID Darinel John A Tam, DO        0.9 % sodium chloride bolus  1,000 mL Intravenous Once Darinel John A Tam, DO        insulin lispro (HUMALOG) injection vial 0-6 Units  0-6 Units Subcutaneous Q4H Ahmad A Tam, DO   Stopped at 01/15/21 1128    glucose (GLUTOSE) 40 % oral gel 15 g  15 g Oral PRN Ahmad A Tam, DO        dextrose 50 % IV solution  12.5 g Intravenous PRN Darinel John A Tam, DO        glucagon (rDNA) injection 1 mg  1 mg Intramuscular PRN Ahmad A Tam, DO        dextrose 5 % solution  100 mL/hr Intravenous PRN Ahmad A Tam, DO        famotidine (PEPCID) injection 20 mg  20 mg Intravenous Daily Shaila Licea MD        mupirocin (BACTROBAN) 2 % ointment   Nasal BID Shaila Licea MD        [facility]) intermittent dosing (placeholder)   Other RX Placeholder Tiffanie Brown MD        0.9 % sodium chloride bolus  1,000 mL Intravenous Once PRN MD Monika FonsecaTE BGK 4/2.5 dialysis solution   Dialysis Continuous MD Monika Fonseca BGK 4/2.5 dialysis solution   Dialysis Continuous Rhonda York MD        prismaDARIUSZ BGK 4/2.5 dialysis solution   Dialysis Continuous Rhonda York MD        0.9 % sodium chloride infusion   Intravenous Continuous Rhonda Yrok MD        potassium chloride 20 mEq/50 mL IVPB (Central Line)  20 mEq Intravenous PRN Rhonda York MD        magnesium sulfate 1 g in dextrose 5% 100 mL IVPB  1 g Intravenous PRN Rhonda York MD        calcium gluconate 1 g in sodium chloride 50 mL  1 g Intravenous PRN Rhonda York MD        Or    calcium gluconate 2 g in dextrose 5 % 100 mL IVPB  2 g Intravenous PRN Rhonda York MD        Or    calcium gluconate 3 g in dextrose 5 % 100 mL IVPB  3 g Intravenous PRN Rhonda York MD        Or    calcium gluconate 4 g in dextrose 5 % 100 mL IVPB  4 g Intravenous PRN Rhonda York MD  sodium phosphate 6 mmol in dextrose 5 % 250 mL IVPB  6 mmol Intravenous PRN Michelle Workman MD        Or    sodium phosphate 12 mmol in dextrose 5 % 250 mL IVPB  12 mmol Intravenous PRN Michelle Workman MD        Or    sodium phosphate 18 mmol in dextrose 5 % 500 mL IVPB  18 mmol Intravenous PRN Michelle Workman MD        Or    sodium phosphate 24 mmol in dextrose 5 % 500 mL IVPB  24 mmol Intravenous PRN Michelle Workman MD        heparin (porcine) injection 1,000 Units  1,000 Units Intracatheter Once PRN Michelle Workman MD           ROS: 10-12 ROS was Limited to obtain secondary to intubation and encephalopathy. Otherwise rest per HPI     Exam:   Vitals:    01/15/21 1100 01/15/21 1258 01/15/21 1300 01/15/21 1400   BP:       Pulse: 73 80 91 75   Resp: 14 14 (!) 31 18   Temp: 93.5 °F (34.2 °C)  95.3 °F (35.2 °C)    TempSrc: Bladder  Bladder    SpO2: 100% 99% 100% 100%   Weight:       Height:         General appearance: intubated and sedated  Eye: No icterus  Fundus: Funduscopic examination could not be performed due to intubation COVID-19 restrictions. Neck: supple  Cardiovascular: No lower leg edema with good pulsation. Mental Status: intubated  Cranial Nerves:   Pupil: RRR  No gaze preference  OCR: present  Corneal: No  Cough: No  Gag: Yes  Spontaneous breathing: No   II: Pupils: equal, round, reactive to light  III,IV,VI: No gaze preference. VII: Face is symmetric   CN from VIII to XII: Cannot be examined due to intubation  Musculoskeletal: no motor response  Tone: normal  Reflexes: diminished but symmetric   Planters: mute  Coordination: Sensation and Gait/Posture: Cannot be examined due to intubation.       Data:  LABS:   Lab Results   Component Value Date     01/15/2021    K 5.9 01/15/2021     01/15/2021    CO2 22 01/15/2021    BUN 81 01/15/2021    CREATININE 2.5 01/15/2021    GFRAA 32 01/15/2021    GFRAA >60 02/07/2013    LABGLOM 27 01/15/2021    GLUCOSE 106 01/15/2021 PHOS 5.2 01/15/2021    MG 2.00 01/15/2021    CALCIUM 8.6 01/15/2021     Lab Results   Component Value Date    WBC 8.1 01/15/2021    RBC 2.23 01/15/2021    HGB 7.1 01/15/2021    HCT 22.7 01/15/2021    .7 01/15/2021    RDW 22.3 01/15/2021    PLT see below 01/15/2021     Lab Results   Component Value Date    INR 2.25 (H) 01/15/2021    PROTIME 26.3 (H) 01/15/2021       Neuroimaging were independently reviewed by me  Reviewed notes from different physicians  Reviewed lab and blood testing    Impression:  Acute encephalopathy, severe. Likely multifactorial metabolic encephalopathy  Acute kidney injury  IVDA  Recent endocarditis with mitral valve vegetation and septic pulmonary emboli. Acute respiratory failure with hypoxia  Multiorgan failure  Sepsis  Hypothyroid    Recommendation:  Complicated case with multiorgan failure and risk for decompensation even sudden death. MRI of the brain showed no evidence of cortical septic emboli. Apparently had recent LP in an outside facility however I do not have copies of such LP  Continue current supportive care  Continue respiratory support  Insulin sliding scale  Antibiotics  Follow kidney function test, CBC electrolytes  Follow TFT and start Synthroid  Prognosis is poor and guarded. I am not sure if he is given a candidate for valve replacement at this point  If clinical status improved, can consider repeating imaging of his lumbar region. MDM: High complexity. Thank you for referring such patient. If you have any questions regarding my consult note, please don't hesitate to call me. Gus Zepeda MD  [phone]    This dictation was generated by voice recognition computer software.  Although all attempts are made to edit the dictation for accuracy, there may be errors in the  transcription that are not intended

## 2021-01-15 NOTE — PROGRESS NOTES
01/15/21 0858   Vent Information   Vent Type 980   Vent Mode AC/VC+   Vt Ordered 400 mL  (increased to 500 mL due to ABG results.)   Rate Set 14 bmp   Pressure Support 0 cmH20   FiO2  30 %   SpO2 97 %   SpO2/FiO2 ratio 323.33   Sensitivity 3   PEEP/CPAP 5   I Time/ I Time % 1.1 s   Vent Patient Data   Peak Inspiratory Pressure 19 cmH2O   Mean Airway Pressure 8 cmH20   Rate Measured 14 br/min   Vt Exhaled 511 mL   Minute Volume 6.45 Liters   I:E Ratio 1:2.90   Cough/Sputum   Sputum How Obtained None   Spontaneous Breathing Trial (SBT) RT Doc   Pulse 85   Breath Sounds   Right Upper Lobe Diminished   Right Middle Lobe Diminished   Right Lower Lobe Diminished   Left Upper Lobe Diminished   Left Lower Lobe Diminished   Additional Respiratory  Assessments   Resp 13   End Tidal CO2 36   Alarm Settings   High Pressure Alarm 40 cmH2O   Low Minute Volume Alarm 2 L/min   Apnea (secs) 20 secs   High Respiratory Rate 40 br/min   Low Exhaled Vt  200 mL   Patient Observation   Observations ambu @ bedside   ETT (adult)   No Placement Date or Time found. Type: Cuffed  Tube Size: 7.5 mm  Location: Oral  Placed By:  In place on arrival to facility  Measured From: Lips   Secured at 24 cm   Measured From Lips   ET Placement Right   Site Condition Cool;Dry

## 2021-01-15 NOTE — PROGRESS NOTES
75 mg Fentanyl gtt infusing. Pt only opens eyes to name and withdraws to painful stimuli. Stopped fentanyl gtt. Agustín Perez

## 2021-01-15 NOTE — PROGRESS NOTES
01/15/21 1611   Vent Information   Vent Type 980   Vent Mode AC/VC+   Vt Ordered 500 mL   Rate Set 18 bmp   FiO2  30 %   SpO2 100 %   SpO2/FiO2 ratio 333.33   Sensitivity 3   PEEP/CPAP 5   I Time/ I Time % 1.1 s   Humidification Source HME   Vent Patient Data   Peak Inspiratory Pressure 22 cmH2O   Mean Airway Pressure 9.1 cmH20   Rate Measured 18 br/min   Vt Exhaled 515 mL   Minute Volume 9.24 Liters   I:E Ratio 1:3.2   Cough/Sputum   Sputum How Obtained Endotracheal;Suctioned   Cough Strong;Productive   Sputum Amount Large   Sputum Color Yellow;Creamy   Tenacity Thick   Spontaneous Breathing Trial (SBT) RT Doc   Pulse 83   Breath Sounds   Right Upper Lobe Rhonchi   Right Middle Lobe Rhonchi   Right Lower Lobe Rhonchi   Left Upper Lobe Rhonchi   Left Lower Lobe Rhonchi   Additional Respiratory  Assessments   Resp 18   End Tidal CO2 22   Alarm Settings   High Pressure Alarm 40 cmH2O   Low Minute Volume Alarm 3 L/min   Apnea (secs) 20 secs   High Respiratory Rate 40 br/min   Low Exhaled Vt  300 mL   Patient Observation   Observations ambu @ bedside   ETT (adult)   No Placement Date or Time found. Type: Cuffed  Tube Size: 7.5 mm  Location: Oral  Placed By:  In place on arrival to facility  Measured From: Lips   Secured at 24 cm   Measured From Lips   ET Placement Right  (moved to the left at this time.)   Secured By Commercial tube rivera   Site Condition Cool;Dry   Cuff Pressure 28 cm H2O

## 2021-01-15 NOTE — PROGRESS NOTES
Order for restraints in chart. Not needed at this time. removed restraints from transferring facility.   Sanjiv Currie

## 2021-01-15 NOTE — PROGRESS NOTES
Patient transported to and from [facility] with no complications during transport. Patient was placed on the transport ventilator and placed back on the 980 ventilator with ordered settings after arriving back at the patient's room. Electronically signed by Addie Blackburn RCP, RRT, RRT-ACCS on 1/15/2021 at 1:05 PM        01/15/21 1258   Vent Information   Vent Type 980   Vent Mode AC/VC+   Vt Ordered 500 mL   Rate Set 14 bmp   Pressure Support 0 cmH20   FiO2  30 %   SpO2 99 %   SpO2/FiO2 ratio 330   Sensitivity 3   PEEP/CPAP 5   Humidification Source HME   Vent Patient Data   Peak Inspiratory Pressure 21 cmH2O   Mean Airway Pressure 8.8 cmH20   Rate Measured 14 br/min   Vt Exhaled 503 mL   Minute Volume 7.13 Liters   I:E Ratio 1:2.9   Patient Transport   Time spent transporting 46-60   Transport ventillation type Transport vent   Transport from ICU   Transport destination MRI   Transport destination MRI   Transport destination ICU   Emergency equipment included Yes   Cough/Sputum   Sputum How Obtained Endotracheal;Suctioned   Cough Strong;Productive   Sputum Amount Moderate   Sputum Color Yellow;Creamy   Tenacity Thick   Spontaneous Breathing Trial (SBT) RT Doc   Pulse 80   Breath Sounds   Right Upper Lobe Diminished   Right Middle Lobe Diminished   Right Lower Lobe Diminished   Left Upper Lobe Diminished   Left Lower Lobe Diminished   Additional Respiratory  Assessments   Resp 14   End Tidal CO2 34   Alarm Settings   High Pressure Alarm 40 cmH2O   Low Minute Volume Alarm 3 L/min   Apnea (secs) 20 secs   High Respiratory Rate 40 br/min   Low Exhaled Vt  200 mL   Patient Observation   Observations ambu @ bedside   ETT (adult)   No Placement Date or Time found. Type: Cuffed  Tube Size: 7.5 mm  Location: Oral  Placed By:  In place on arrival to facility  Measured From: Lips   Secured at 24 cm   Measured From Lips   ET Placement Right  (moved to the right at this time.)   Secured By Commercial tube rivera Site Condition Cool;Dry   Cuff Pressure 28 cm H2O

## 2021-01-15 NOTE — PROGRESS NOTES
01/15/21 0626   Vent Information   $Ventilation $Initial Day   Skin Assessment Clean, dry, & intact   Vt Ordered 400 mL   Rate Set 14 bmp   Peak Flow 50 L/min   Pressure Support 0 cmH20   FiO2  35 %   SpO2 100 %   SpO2/FiO2 ratio 285.71   Sensitivity 3   PEEP/CPAP 5   Vent Patient Data   Peak Inspiratory Pressure 18 cmH2O   Mean Airway Pressure 8.6 cmH20   Rate Measured 15 br/min   Vt Exhaled 434 mL   Minute Volume 6.26 Liters   I:E Ratio 1:3.90   Spontaneous Breathing Trial (SBT) RT Doc   Pulse 95   Additional Respiratory  Assessments   Resp 12   Alarm Settings   High Pressure Alarm 40 cmH2O   Low Minute Volume Alarm 2 L/min   High Respiratory Rate 40 br/min

## 2021-01-15 NOTE — PROGRESS NOTES
Patient seen this am during am rounds. Complicated recent medical history with known hx of TV endocarditis, septic pulmonary emboli, recent right empyema with thoracotomy with chest tube placement from [facility]. Decompensated post-op requiring MV and transferred to [facility] for CT surgical opinion. Patient remains encephalopathic, sedation turned off this am and opens eyes but not following commands. MRI brain today was negative for acute changes, seen by Neurology this am as well. Renal consulted with poor UOP and worsening acidosis/hyperkalemia. CRRT suggested but given overall guarded prognosis, family is now considering shifting focus to comfort care. Lengthy discussions today with daughter Ayaan Calhoun who is speaking with other siblings before making final decision (patient has 5 children). Hold on placing dialysis cathter for now until family has had time to discuss, repeat K 5.3. Patient currently FULL code, point of contact has been Ayaan Calhoun (daughter) lives in [place]. Ongoing family discussions today regarding goals of care. Updated primary RN  Discussed with Sandrita Hollins today. Christina Armstrong, APRN-CNP

## 2021-01-15 NOTE — CONSULTS
Temp Readings from Last 3 Encounters:   01/15/21 93.5 °F (34.2 °C) (Bladder)   01/01/21 97.6 °F (36.4 °C) (Axillary)   06/26/19 98.4 °F (36.9 °C) (Oral)     BP Readings from Last 3 Encounters:   01/15/21 98/69   01/01/21 (!) 145/87   06/26/19 103/76     Pulse Readings from Last 3 Encounters:   01/15/21 80   01/01/21 88   06/26/19 86   General appearance:  intubated, appears older than stated age, frail  HEENT:   atraumatic, sclera anicteric, Conjunctivae pale  Neck: Supple,Trachea midline, no goiter  Respiratory: Intubated sedated, rhonchi   Cardiovascular: Tachycardic, grade 3 systolic murmur , no rub  Abdomen: Soft, distended with normal bowel sounds. Musculoskeletal:  No clubbing, cyanosis. trace edema feet  Skin: turgor normal.  No new rashes or lesions. Neurologic: Unable to assess: patient intubated sedated     DATA:    Lab Results   Component Value Date    WBC 8.1 01/15/2021    HGB 7.1 (L) 01/15/2021    HCT 22.7 (L) 01/15/2021    .7 (H) 01/15/2021    PLT see below 01/15/2021     Lab Results   Component Value Date     01/15/2021    K 5.9 01/15/2021     01/15/2021    CO2 22 01/15/2021    BUN 81 01/15/2021    CREATININE 2.5 01/15/2021    GLUCOSE 106 01/15/2021    CALCIUM 8.6 01/15/2021          IMPRESSION/RECOMMENDATIONS:    1. ABIOLA- in setting of septic shock  -oligoanuric, despite aggressive iv fluid resuscitation  -will start CRRT as soon as access placed  -urine studies, labs as ordered  CRRT in chart    2. Hyperkalemia  -iv f with bicarbonate . Start CRRT when access placed    3. Shock- sepric, in setting of endocarditis/OM/dsciitis  -abx, pressors per ICU    DIscussed with ICU team  T ICU 35 min

## 2021-01-15 NOTE — CARE COORDINATION
Writer received return call from patient daughter Leann Campbell. While on phone her sister called and stated she just got updates from [facility] NP stating that patient extremely ill and could be hour to hour. Family did confirm that patient had been at [facility] and ok to return there at discharge if needed. CM will follow for stability.  Zulay Lott RN

## 2021-01-15 NOTE — PROGRESS NOTES
Order to place Arterial Line for BP management and ABG draws. Emergent need. Allens test negative. R radial [place] placed using sterile technique. D&S line dsg placed. Appropriate waveforms, no oozing, no hematoma. Continue to monitor site per protocols.  Stacie Fonseca

## 2021-01-15 NOTE — CONSULTS
Department of Cardiovascular & Thoracic Surgery  History and Physical          DIAGNOSIS: tricuspid valve endocarditis      CHIEF COMPLAINT:  Unable to obtain, pt is encephalopathic     History Obtained From:  electronic medical record    HISTORY OF PRESENT ILLNESS:      The patient is a 60 y.o. male with significant past medical history of HCV, HBV, MRSA bacteremia, IVDA, and tobacco abuse who was transferred to [facility] today from [facility] in [address] [place] [address] where he was admitted for septic shock 2/2 tricuspid valve endocarditis, discitis, osteomyelitis, myositis, and SBP. Of note, the patient was admitted on 12/23 with decompensated cirrhosis, hepatic encephalopathy, septic pulmonary emboli and was also found to have a large mobile vegetation on the tricuspid valve with mild tricuspid regurgitation. He received vancomycin and ceftriaxone. He was discharged to [facility] on 1/1. We are consulted for further evaluation and consideration of surgical intervention regarding his tricuspid valve endocarditis. Past Medical History:        Diagnosis Date    Back pain     Hepatitis B surface antigen positive 08/31/2017    Hepatitis C antibody positive in blood 08/31/2017    MRSA (methicillin resistant staph aureus) culture positive 12/25/2020    bactermia       Past Surgical History:        Procedure Laterality Date    KIDNEY REMOVAL Right     WRIST SURGERY         Medications Prior to Admission:   Medications Prior to Admission: traZODone (DESYREL) 50 MG tablet, Take 1 tablet by mouth nightly as needed for Sleep  lactulose (CHRONULAC) 10 GM/15ML solution, Take 30 mLs by mouth 3 times daily  nicotine (NICODERM CQ) 14 MG/24HR, Place 1 patch onto the skin every 24 hours for 14 days    Allergies:  Codeine    Social History:    TOBACCO:   reports that he has been smoking cigarettes. He has been smoking about 1.00 pack per day. He has never used smokeless tobacco.  ETOH:   reports no history of alcohol use. CAFFEINE ABUSE:  No  DRUGS:   reports current drug use. Drugs: IV, Cocaine, Opiates , and Marijuana. LIFESTYLE: unable to assess, pt intubated/minimally responsive     MARITAL STATUS:    OCCUPATION:  Homeless      Family History:    No family history on file. REVIEW OF SYSTEMS:      Unable to perform ROS as pt is intubated and minimally responsive. PHYSICAL EXAM:    VITALS:  BP 98/69   Pulse 85   Resp 12   Ht 5' 10\" (1.778 m)   Wt 170 lb 3.1 oz (77.2 kg)   SpO2 97%   BMI 24.42 kg/m²     Constitutional:  Chronically ill appearing male. Appears older than documented age. Intubated. Eyes:  lids and lashes normal, pupils equal, round and reactive to light, extra ocular muscles intact, sclera clear, conjunctiva normal    Head/ENT:   normal teeth, gums, & palate. Moist mucus membranes. No cyanosis or pallor. Neck:  supple, symmetrical, trachea midline, no lymphadenopathy, no jugular venous distension, no carotid bruits and MASSES:  no masses. No thyromegaly. Lungs: intubated and ventilated. No wheezing, rhonchi or crackles appreciated. PEEP 5, 50% FiO2. Cardiovascular:  regular rate and rhythm, S1, S2 normal, no murmur, click, rub or gallop. Apical impulse in 5th intercostal space. Pulses:  Right dorsalis pedis 1, Left dorsalis pedis 1, Right posterior tibial 1, Left Posterior tibial 1, Right radial 2, and Left radial 2. Abdomen:  hypoactive bowel sounds, non-tender    Musculoskeletal:  Back is straight. No kyphosis or scoliosis. Extremities:  Warm, pink, no clubbing, cyanosis, petechiae, ischemia, or deformities. No peripheral edema. Skin: no rashes, no petechiae, no nodules, right thoracotomy site CDI, mild surrounding erythema.      Neurological/Psychiatric: off of sedation, opens eyes to painful stimuli     DATA:  EK/15  Normal sinus rhythmNormal ECGWhen compared with ECG of 23-DEC-2020 13:51,No significant change was found    CBC:   Lab Results Component Value Date    WBC 8.1 01/15/2021    RBC 2.23 01/15/2021    HGB 7.1 01/15/2021    HCT 22.7 01/15/2021    .7 01/15/2021    MCH 31.9 01/15/2021    MCHC 31.3 01/15/2021    RDW 22.3 01/15/2021    PLT see below 01/15/2021    MPV see below 01/15/2021     CMP:    Lab Results   Component Value Date     01/15/2021    K 5.9 01/15/2021     01/15/2021    CO2 22 01/15/2021    BUN 81 01/15/2021    CREATININE 2.5 01/15/2021    GFRAA 32 01/15/2021    GFRAA >60 02/07/2013    AGRATIO 1.0 01/15/2021    LABGLOM 27 01/15/2021    GLUCOSE 106 01/15/2021    PROT 6.2 01/15/2021    PROT 7.3 02/07/2013    LABALBU 3.1 01/15/2021    CALCIUM 8.6 01/15/2021    BILITOT 0.7 01/15/2021    ALKPHOS 73 01/15/2021    AST 41 01/15/2021    ALT 23 01/15/2021     Magnesium:    Lab Results   Component Value Date    MG 2.00 01/15/2021     Phosphorus:    Lab Results   Component Value Date    PHOS 5.2 01/15/2021     PT/INR:    Lab Results   Component Value Date    PROTIME 26.3 01/15/2021    INR 2.25 01/15/2021     CXRAY:  1/15  Impression   Persistent patchy multifocal nodular opacity within the mid to lower lung   zones which can reflect pneumonia in the appropriate clinical setting.  Small   pleural effusions.         ECHO: 12/24   Summary   Technically difficult examination due to patient immobility. Normal LV systolic function with a visually estimated EF of 55%. Endocardium not entirely well visualized but no obvious segmental wall   motion abnormalities. Normal left ventricular diastolic filling pressure. There is a large mobile vegetation attached to the tricuspid valve,   primarily on the atrial portion but appearing to swing into the right   ventricle. It measures approximately 1.3 by 1.1 cm. Mild tricuspid regurgitation. Systolic pulmonary artery pressure (SPAP) is normal and estimated at 27 mmHg   (right atrial pressure 3 mmHg).     CT Chest:  12/23  Impression Findings most compatible with diffuse septic emboli.  Superimposed bilateral   lower lobe airspace opacities.       No pulmonary arterial filling defect.       Multiple prominent mediastinal lymph nodes, likely reactive.           CT abd/pelvis: 12/23  Impression   No acute abnormality detected within the abdomen and pelvis.       Moderate splenomegaly.       Absent right kidney. *Imaging from OSH unavailable for review     LHC:  No prior in chart for review. ASSESSMENT AND PLAN:    Mr. Zay Knight is a 60 y.o. male with history of IVDA, HCV, MRSA bacteremia and tobacco abuse who was being treated at OSH for septic shock 2/2 tricuspid valve endocarditis, discitis, osteomyelitis, and SBP. Also appears to have had empyema. Currently on cefepime, doxycycline, vanc and flagyl. Requiring pressor support with levo. AM ABG with pH 7.19, now on bicarb gtt. Intubated but off  of sedation and only opens eyes to painful stimuli. Agree with plan for MRI brain today. Will hold off on obtaining MAXI at this time at the patient is unlikely to be a surgical candidate. Given his medical complexity there is a strong concern for poor outcomes with surgical intervention. Fabiano Bowden CNP   1/15/2021  11:03 AM  Pt status is DNR-CCA. No surgical option at this time. Will sign off. Note reviewed, events of last 24 hours reviewed along with vital signs and I/Os and patient examined. Assessment and plans discussed and are as outlined above.      Elbert Bird MD, FACS, [facility], FACCP, [facility]

## 2021-01-15 NOTE — PROGRESS NOTES
Dr. Tj Rust  returned page for nephrology and informed that family made pt DCR-CCA and did not want to initiate CRRT and that Dr. Sita Cervantes had wanted to know the final decision of CRRT.  Antonio Teixeira

## 2021-01-15 NOTE — PROGRESS NOTES
Comprehensive Nutrition Assessment    Type and Reason for Visit:  Initial    Nutrition Recommendations/Plan:   1. NPO for now  2. Monitor ability to start TF if pt continues intubated. 3. If TF needed, recommend Nepro (Renal formula) at 10 mL/hr. Increase by 10 mL q 4 hrs to goal of 55 ml/hr  4. 30 mL free water flush q 4 hrs to maintain tube integrity. Monitor Na, IVF and need for flush adjustments. 5. Monitor nutrition adequacy, pertinent labs, bowel habits, wt changes, and clinical progress      Nutrition Assessment:  Pt intubated in the ICU. Sedated on precedex and fentanyl. Transferred from a different hospital 2/2 endocarditis requiring CTS consult. Plans for MAXI today. NPO for now per MDR. Malnutrition Assessment:  Malnutrition Status:  No malnutrition      Estimated Daily Nutrient Needs:  Energy (kcal):  [id number]; Weight Used for Energy Requirements:  Current(77 kg)     Protein (g):  ; Weight Used for Protein Requirements:  Current(1.2-1.4)        Fluid (ml/day):  1 mL/kcal; Method Used for Fluid Requirements:  1 ml/kcal      Nutrition Related Findings:  Labs reviewed: K 5.9, Phos 5.2      Wounds:  None       Current Nutrition Therapies:    Diet NPO Effective Now    Anthropometric Measures:  · Height: 5' 10\" (177.8 cm)  · Current Body Weight: 170 lb (77.1 kg)   · Ideal Body Weight: 166 lbs  · BMI: 24.4  · BMI Categories: Normal Weight (BMI 22.0 to 24.9) age over 72       Nutrition Diagnosis:   · Inadequate oral intake related to acute injury/trauma, impaired respiratory function as evidenced by NPO or clear liquid status due to medical condition, intubation      Nutrition Interventions:   Food and/or Nutrient Delivery:  Continue NPO  Coordination of Nutrition Care:  Interdisciplinary Rounds    Goals:   Tolerate most appropriate form of nutrition therapy when medically appropriate       Nutrition Monitoring and Evaluation:   Behavioral-Environmental Outcomes:  None Identified

## 2021-01-15 NOTE — H&P
Hospital Medicine History & Physical      PCP: No primary care provider on file. Date of Admission: 1/15/2021    Date of Service: Pt seen/examined on 1/15/2021  Pt seen/examined face to face on and admitted as inpatient with expected LOS greater than two midnights due to medical therapy    Chief Complaint:    No chief complaint on file. History is limited patient is encephalopathic    History Of Present Illness:      60 y.o. male who presented to [facility] with past medical history of IV drug usage, right nephrectomy, cigarette smoking, cirrhosis presented as a transfer from [facility] in [address] [place] [address] to our facility for septic shock secondary to endocarditis, discitis, osteomyelitis, myositis, SBP. Reason for transfer is due to endocarditis requiring CT surgery which they do not have the service at their facility      Very difficult to gather from the note that was sent, patient is intubated and encephalopathic. After reviewing documentation patient apparently was admitted for encephalopathy and sepsis. Patient at the time had a CT head that was negative and then underwent a lumbar puncture with CSF studies sent. Patient hospitalization was complicated with septic shock,, osteodiscitis, bacteremia MRSA. Patient continued receiving antibiotic for multiple days and continues to be positive with blood cultures. Repeat echocardiogram was performed that showed a 2. 3x1 vegetation. Patient was subsequently transferred to our facility. No dc summary present as well.           Past Medical History:          Diagnosis Date    Back pain     Hepatitis B surface antigen positive 08/31/2017    Hepatitis C antibody positive in blood 08/31/2017    MRSA (methicillin resistant staph aureus) culture positive 12/25/2020    bactermia       Past Surgical History:          Procedure Laterality Date    KIDNEY REMOVAL Right     WRIST SURGERY         Medications Prior to Admission: Prior to Admission medications    Medication Sig Start Date End Date Taking? Authorizing Provider   traZODone (DESYREL) 50 MG tablet Take 1 tablet by mouth nightly as needed for Sleep 1/1/21 1/8/21  Ren Acharya MD   lactulose [facility]) 10 GM/15ML solution Take 30 mLs by mouth 3 times daily 1/1/21 1/31/21  Ren Acharya MD   nicotine (NICODERM CQ) 14 MG/24HR Place 1 patch onto the skin every 24 hours for 14 days 1/1/21 1/15/21  Ren Acharya MD       Allergies:  Codeine    Social History:          TOBACCO:   reports that he has been smoking cigarettes. He has been smoking about 1.00 pack per day. He has never used smokeless tobacco.  ETOH:   reports no history of alcohol use. E-Cigarettes/Vaping Use     Questions Responses    E-Cigarette/Vaping Use Never User    Start Date     Passive Exposure     Quit Date     Counseling Given     Comments             Family History:      Reviewed in detail, unclear at this time    No family history on file. REVIEW OF SYSTEMS:   Patient intubated sedated    PHYSICAL EXAM PERFORMED:    BP 84/61   Pulse 95   Resp 12   SpO2 100%     General appearance:  mild acute distress, appears older than stated age, frail  HEENT:   atraumatic, sclera anicteric, Conjunctivae pale  Neck: Supple,Trachea midline, no goiter  Respiratory: Intubated sedated, rhonchi bacillary  Cardiovascular: Tachycardic, grade 3 systolic murmur capillary refill 2 seconds  Abdomen: Soft, distended with normal bowel sounds. Musculoskeletal:  No clubbing, cyanosis. trace edema LE bilaterally. Skin: turgor normal.  No new rashes or lesions. Neurologic: Unable to assess patient intubated sedated    Labs:     No results for input(s): WBC, HGB, HCT, PLT in the last 72 hours. No results for input(s): NA, K, CL, CO2, BUN, CREATININE, CALCIUM, PHOS in the last 72 hours. Invalid input(s): MAGNES  No results for input(s): AST, ALT, BILIDIR, BILITOT, ALKPHOS in the last 72 hours. No results for input(s): INR in the last 72 hours. No results for input(s): Yaya Marbin in the last 72 hours. Urinalysis:      Lab Results   Component Value Date    NITRU POSITIVE 12/23/2020    WBCUA 21-50 12/23/2020    BACTERIA 3+ 12/23/2020    RBCUA 3-4 12/23/2020    BLOODU SMALL 12/23/2020    SPECGRAV 1.015 12/23/2020    GLUCOSEU Negative 12/23/2020       Radiology:     CXR: I have reviewed the CXR with the following interpretation:   Pending  EKG:  I have reviewed the EKG with the following interpretation:   Pending    XR CHEST PORTABLE    (Results Pending)       ASSESSMENT AND PLAN:    Active Hospital Problems    Diagnosis Date Noted    Endocarditis [I38] 01/14/2021     Encephalopathy:  Last CT was negative  On lactulose currently and restarted per alyssa orders  Repeat CT head pending  Also read csf fluids but no culture? Requesting records. Neurology consulted for assistance    Septic shock:  Secondary to endocarditis MRSA bacteremia, osteodiscitis C4-C7  There was suspicion of osteomyelitis of T6 as well, SBP Per chart review, empyema per chart review. Obtain 3 blood cultures  On broad-spectrum antibiotic  ID, critical care consulted    Endocarditis:  2.3 x1 cm vegetation noted on echo  Cardiology consulted for possible MAXI  CT surgery consulted    Acute respiratory failure:  Intubated sedated at this time  ABG, chest x-ray ordered  Responding well with vent current time  Continue to monitor pending results    MRSA bacteremia:  Likely secondary to endocarditis, osteodiscitis:   We will continue to see it via the valve vegetation  ID and CT surgery consulted    Right pleural multiloculated effusion:  On paperwork, chest x-ray pending  Critical care consulted    Cirrhosis decompensated: with liver lesions  SBP on alyssa note  On abx  GI consulted    ABIOLA: hx of right nephrectemy  Nephrology consulted     Acidosis:  Started bicarb    Discitis: C4 to C7  On abx, ID consulted

## 2021-01-15 NOTE — PROGRESS NOTES
Hospitalist Progress Note      PCP: No primary care provider on file. Date of Admission: 1/15/2021    Chief Complaint: Unknown    Subjective: no new c/o.         Medications:  Reviewed    Infusion Medications    norepinephrine 4 mcg/min (01/15/21 0650)    vasopressin (Septic Shock) infusion      sodium bicarbonate infusion 50 mL/hr at 01/15/21 0650    fentaNYL (SUBLIMAZE) infusion Stopped (01/15/21 0908)    dexmedetomidine      dextrose      prismaSATE BGK 4/2.5      prismaSATE BGK 4/2.5      prismaSATE BGK 4/2.5      sodium chloride 100 mL/hr at 01/15/21 1508     Scheduled Medications    sodium chloride flush  10 mL Intravenous 2 times per day    cefepime  2 g Intravenous Q12H    metroNIDAZOLE  500 mg Intravenous Q8H    doxycycline (VIBRAMYCIN) IV  100 mg Intravenous Q12H    lactulose  20 g Per NG tube TID    sodium chloride  1,000 mL Intravenous Once    insulin lispro  0-6 Units Subcutaneous Q4H    famotidine (PEPCID) injection  20 mg Intravenous Daily    mupirocin   Nasal BID    vancomycin (VANCOCIN) intermittent dosing (placeholder)   Other RX Placeholder    [START ON 1/16/2021] heparin (porcine)  5,000 Units Subcutaneous 3 times per day     PRN Meds: sodium chloride flush, promethazine **OR** ondansetron, polyethylene glycol, acetaminophen **OR** acetaminophen, glucose, dextrose, glucagon (rDNA), dextrose, sodium chloride, potassium chloride, magnesium sulfate, calcium gluconate **OR** calcium gluconate **OR** calcium gluconate **OR** calcium gluconate, sodium phosphate IVPB **OR** sodium phosphate IVPB **OR** sodium phosphate IVPB **OR** sodium phosphate IVPB, heparin (porcine)      Intake/Output Summary (Last 24 hours) at 1/15/2021 1654  Last data filed at 1/15/2021 1400  Gross per 24 hour   Intake 595 ml   Output 130 ml   Net 465 ml       Physical Exam Performed: BP 98/69   Pulse 83   Temp 96.4 °F (35.8 °C) (Bladder)   Resp 18   Ht 5' 10\" (1.778 m)   Wt 170 lb 3.1 oz (77.2 kg)   SpO2 100%   BMI 24.42 kg/m²     General appearance: No apparent distress, appears stated age. Intubated/sedated. HEENT: Pupils equal, round, and reactive to light. Conjunctivae/corneas clear. Neck: Supple, with full range of motion. No jugular venous distention. Trachea midline. Respiratory:  Normal respiratory effort. Clear to auscultation, bilaterally without Rales/Wheezes/Rhonchi. Cardiovascular: Regular rate and rhythm with normal S1/S2 without murmurs, rubs or gallops. Abdomen: Soft, non-tender, non-distended with normal bowel sounds. Musculoskeletal: No clubbing, cyanosis or edema bilaterally. Full range of motion without deformity. Skin: Skin color, texture, turgor normal.  No rashes or lesions.   Capillary Refill: Brisk,< 3 seconds   Peripheral Pulses: +2 palpable, equal bilaterally       Labs:   Recent Labs     01/15/21  0630   WBC 8.1   HGB 7.1*   HCT 22.7*   PLT see below     Recent Labs     01/15/21  0630      K 5.9*   *   CO2 22   BUN 81*   CREATININE 2.5*   CALCIUM 8.6   PHOS 5.2*     Recent Labs     01/15/21  0630   AST 41*   ALT 23   BILITOT 0.7   ALKPHOS 73     Recent Labs     01/15/21  0630   INR 2.25*     Recent Labs     01/15/21  0630 01/15/21  1618   CKTOTAL 36*  --    TROPONINI  --  <0.01       Urinalysis:      Lab Results   Component Value Date    NITRU POSITIVE 12/23/2020    WBCUA 21-50 12/23/2020    BACTERIA 3+ 12/23/2020    RBCUA 3-4 12/23/2020    BLOODU SMALL 12/23/2020    SPECGRAV 1.015 12/23/2020    GLUCOSEU Negative 12/23/2020       Consults:    IP CONSULT TO PULMONOLOGY  IP CONSULT TO INFECTIOUS DISEASES  PHARMACY TO DOSE VANCOMYCIN  IP CONSULT TO CARDIOTHORACIC SURGERY  IP CONSULT TO PALLIATIVE CARE  IP CONSULT TO CARDIOLOGY  IP CONSULT TO NEPHROLOGY  IP CONSULT TO GI  IP CONSULT TO NEUROLOGY  IP CONSULT TO PHARMACY      Assessment/Plan: Active Hospital Problems    Diagnosis    Acute encephalopathy [G93.40]    Acute respiratory failure with hypoxia (HCC) [J96.01]    HTN (hypertension), benign [I10]    IVDA (intravenous drug abuse) complicating pregnancy ([facility] [address] [O99.320, F19.10]    Endocarditis [I38]         Encephalopathy:  Last CT was negative  On lactulose currently and restarted per alyssa orders  Repeat CT head pending  Also read csf fluids but no culture? Requesting records. Neurology consulted for assistance     Septic shock:  Secondary to endocarditis MRSA bacteremia, osteodiscitis C4-C7  There was suspicion of osteomyelitis of T6 as well, SBP Per chart review, empyema per chart review. Obtain 3 blood cultures  On broad-spectrum antibiotic  ID, critical care consulted     Endocarditis:  2.3 x1 cm vegetation noted on echo  Cardiology consulted for possible MAXI  CT surgery consulted     Acute respiratory failure:  Intubated sedated at this time  ABG, chest x-ray ordered  Responding well with vent current time  Continue to monitor pending results     MRSA bacteremia:  Likely secondary to endocarditis, osteodiscitis: We will continue to see it via the valve vegetation  ID and CT surgery consulted     Right pleural multiloculated effusion:  On paperwork, chest x-ray pending  Critical care consulted     Cirrhosis decompensated: with liver lesions  SBP on alyssa note  On abx  GI consulted     ABIOLA: hx of right nephrectemy  Nephrology consulted      Acidosis:  Started bicarb     Discitis: C4 to C7  On abx, ID consulted     Multiple pulm nodules: mulitofcal cavitary and non cavitary nodules  Pulm and ID consulted         DVT Prophylaxis: SQ Heparin/IPC  Diet: Diet NPO Effective Now  Code Status: Full Code      PT/OT Eval Status: not yet ordered. Dispo - Remains in ICU. Here for the foreseeable future.      Bijal Hoyos MD

## 2021-01-15 NOTE — PROGRESS NOTES
CTS at bedside for consult. Informed with history and current status. Dr. Zana Ching removed CTs from suction and asked that critical care/pulmonology manage the CTs.  Sanjiv Currie

## 2021-01-15 NOTE — PROGRESS NOTES
Respiratory rate increased from 14 to 18 breaths/min at this time due to ABG results.      Electronically signed by Jhony Hubbard RCP, RRT, RRT-ACCS on 1/15/2021 at 1:14 PM

## 2021-01-15 NOTE — PLAN OF CARE
Problem: Restraint Use - Nonviolent/Non-Self-Destructive Behavior:  Goal: Absence of restraint indications  Description: Absence of restraint indications  Outcome: Ongoing  Goal: Absence of restraint-related injury  Description: Absence of restraint-related injury  Outcome: Ongoing   Removed today    Problem: Falls - Risk of:  Goal: Will remain free from falls  Description: Will remain free from falls  Outcome: Ongoing  Goal: Absence of physical injury  Description: Absence of physical injury  Outcome: Ongoing     Problem: Skin Integrity:  Goal: Will show no infection signs and symptoms  Description: Will show no infection signs and symptoms  Outcome: Ongoing  Goal: Absence of new skin breakdown  Description: Absence of new skin breakdown  Outcome: Ongoing     Problem: Discharge Planning:  Goal: Participates in care planning  Description: Participates in care planning  Outcome: Ongoing  Goal: Discharged to appropriate level of care  Description: Discharged to appropriate level of care  Outcome: Ongoing     Problem: Airway Clearance - Ineffective:  Goal: Ability to maintain a clear airway will improve  Description: Ability to maintain a clear airway will improve  Outcome: Ongoing     Problem: Anxiety/Stress:  Goal: Level of anxiety will decrease  Description: Level of anxiety will decrease  Outcome: Ongoing     Problem: Aspiration:  Goal: Absence of aspiration  Description: Absence of aspiration  Outcome: Ongoing     Problem:  Bowel Function - Altered:  Goal: Bowel elimination is within specified parameters  Description: Bowel elimination is within specified parameters  Outcome: Ongoing     Problem: Cardiac Output - Decreased:  Goal: Hemodynamic stability will improve  Description: Hemodynamic stability will improve  Outcome: Ongoing     Problem: Fluid Volume - Imbalance:  Goal: Absence of imbalanced fluid volume signs and symptoms  Description: Absence of imbalanced fluid volume signs and symptoms

## 2021-01-15 NOTE — PROGRESS NOTES
Removed vazquez catheter from transferring hospital. approx date of insertion was 1/7/2021. Replaced with 16FR temp sensing vazquez using sterile technique. See flowsheet.  Tyler Peres

## 2021-01-15 NOTE — PROGRESS NOTES
Page out to nephrology to inform that family has decided to make DNR-CCA and not to start CRRT.  Renetta Frances

## 2021-01-15 NOTE — CONSULTS
 [START ON 2021] heparin (porcine)  5,000 Units Subcutaneous 3 times per day     Infusions:    norepinephrine 5 mcg/min (01/15/21 1712)    vasopressin (Septic Shock) infusion      sodium bicarbonate infusion 50 mL/hr at 01/15/21 0650    fentaNYL (SUBLIMAZE) infusion Stopped (01/15/21 0908)    dexmedetomidine      dextrose      prismaSATE BGK 4/2.5      prismaSATE BGK 4/2.5      prismaSATE BGK 4/2.5      sodium chloride 100 mL/hr at 01/15/21 1508     PRN Medications: sodium chloride flush, promethazine **OR** ondansetron, polyethylene glycol, acetaminophen **OR** acetaminophen, glucose, dextrose, glucagon (rDNA), dextrose, sodium chloride, potassium chloride, magnesium sulfate, calcium gluconate **OR** calcium gluconate **OR** calcium gluconate **OR** calcium gluconate, sodium phosphate IVPB **OR** sodium phosphate IVPB **OR** sodium phosphate IVPB **OR** sodium phosphate IVPB, heparin (porcine)  Allergies: Allergies   Allergen Reactions    Codeine        Past Medical History:   Diagnosis Date    Back pain     Hepatitis B surface antigen positive 2017    Hepatitis C antibody positive in blood 2017    MRSA (methicillin resistant staph aureus) culture positive 2020    bactermia     Past Surgical History:   Procedure Laterality Date    KIDNEY REMOVAL Right     WRIST SURGERY         Social:   Social History     Tobacco Use    Smoking status: Current Every Day Smoker     Packs/day: 1.00     Types: Cigarettes    Smokeless tobacco: Never Used   Substance Use Topics    Alcohol use: No     Family: No family history on file. ROS: Pertinent items are noted in HPI.     Objective:   Vital Signs:  Temp (24hrs), Av.2 °F (35.1 °C), Min:92 °F (33.3 °C), Max:98.8 °F (03.5 °C)     Systolic (23ELP), BRM:89 , Min:84 , LBR:03      Diastolic (41MHK), VJE:65, Min:61, Max:70     Pulse  Av.9  Min: 73  Max: 96 BP 98/69   Pulse 84   Temp 98.8 °F (37.1 °C) (Bladder) Comment: mistral air to low heat  Resp 18   Ht 5' 10\" (1.778 m)   Wt 170 lb 3.1 oz (77.2 kg)   SpO2 99%   BMI 24.42 kg/m²      Physical Exam:   BP 98/69   Pulse 90   Temp 98.8 °F (37.1 °C) (Bladder)   Resp 18   Ht 5' 10\" (1.778 m)   Wt 170 lb 3.1 oz (77.2 kg)   SpO2 99%   BMI 24.42 kg/m²   General appearance: appears stated age and Intubated and sedated  Lungs: clear to auscultation bilaterally  Chest wall: no tenderness  Heart: regular rate and rhythm, S1, S2 normal, no murmur, click, rub or gallop  Abdomen: soft, non-tender; bowel sounds normal; no masses,  no organomegaly  Extremities: extremities normal, atraumatic, no cyanosis or edema  Skin: Skin color, texture, turgor normal. No rashes or lesions  Neurologic: Intubated and sedated    Lab and Imaging Review   Recent Labs     01/15/21  0630   WBC 8.1   HGB 7.1*   .7*   PLT see below   INR 2.25*      K 5.9*   *   CO2 22   BUN 81*   CREATININE 2.5*   GLUCOSE 106*   CALCIUM 8.6   PROT 6.2*   LABALBU 3.1*   AST 41*   ALT 23   ALKPHOS 73   BILITOT 0.7   MG 2.00       Assessment:     Patient Active Problem List    Diagnosis Date Noted    Acute encephalopathy     Acute respiratory failure with hypoxia (HCC)     HTN (hypertension), benign     IVDA (intravenous drug abuse) complicating pregnancy ([facility] [address]     Endocarditis 01/14/2021    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) (HCC)     Hepatic encephalopathy (HCC)     Septic embolism (HCC)     Elevated liver enzymes     Chronic hepatitis C without hepatic coma (HCC)     Acute septic pulmonary embolus without acute cor pulmonale (HCC) 12/23/2020    Gastroenteritis 09/01/2017    Nausea & vomiting 09/01/2017    Leukocytosis 09/01/2017    Marijuana smoker 09/01/2017 61 yo, my established patient in the office since 7/2019, w HTN, cirrhosis (Hep B/C), chronic anemia and IVDA admitted w ?encephalopathy and septic shock/MRSA bacteremia due to endocarditis, discitis, osteomyelitis and SBP, w respiratory failure on mechanical ventilation, sedation and pressors w chest tube in place. MELD score is 24. Plan:   1. Supportive care  2. Agree w Abx's  3. Cardiology, CT Surgery and ID are following  4. Will need full liver w/u and eval as well as EGD/colonoscopy when condition improves and prognosis is better  5.  Will follow along    Kitty Barrera MD       (O) [phone]

## 2021-01-16 PROBLEM — D69.6 THROMBOCYTOPENIA (HCC): Status: ACTIVE | Noted: 2021-01-01

## 2021-01-16 NOTE — PROGRESS NOTES
01/16/21 0435   Vent Information   Equipment Changed HME   Vent Type 980   Vent Mode AC/VC+   Vt Ordered 500 mL   Rate Set 18 bmp   FiO2  30 %   SpO2 100 %   SpO2/FiO2 ratio 333.33   Sensitivity 3   PEEP/CPAP 5   I Time/ I Time % 1 s   Humidification Source HME   Vent Patient Data   Peak Inspiratory Pressure 22 cmH2O   Mean Airway Pressure 9.9 cmH20   Rate Measured 18 br/min   Vt Exhaled 513 mL   Minute Volume 9.3 Liters   I:E Ratio 1:2.20   Cough/Sputum   Sputum How Obtained Endotracheal   Cough Productive   Sputum Amount Large   Sputum Color Dark Yellow   Tenacity Thick   Spontaneous Breathing Trial (SBT) RT Doc   Pulse 90   Breath Sounds   Right Upper Lobe Rhonchi   Right Middle Lobe Rhonchi   Right Lower Lobe Rhonchi   Left Upper Lobe Rhonchi   Left Lower Lobe Rhonchi   Alarm Settings   High Pressure Alarm 40 cmH2O   Press Low Alarm 8.5 cmH2O   Low Minute Volume Alarm 3 L/min   High Respiratory Rate 40 br/min   Low Exhaled Vt  300 mL   ETT (adult)   No Placement Date or Time found. Type: Cuffed  Tube Size: 7.5 mm  Location: Oral  Placed By:  In place on arrival to facility  Measured From: Lips   Secured at 24 cm   Measured From Lips   ET Placement Left  (pt biting, unable to move ETT)   Secured By Commercial tube rivera   Site Condition Dry

## 2021-01-16 NOTE — PROGRESS NOTES
This note also relates to the following rows which could not be included:  SpO2 - Cannot attach notes to unvalidated device data  Pulse - Cannot attach notes to unvalidated device data       01/16/21 1213   Vent Information   Equipment Changed HME   Vent Type 980   Vent Mode AC/VC+   Vt Ordered 500 mL   Rate Set 18 bmp   Peak Flow 50 L/min   FiO2  30 %   Sensitivity 3   PEEP/CPAP 5   I Time/ I Time % 1 s   Humidification Source HME   Vent Patient Data   Peak Inspiratory Pressure 23 cmH2O   Mean Airway Pressure 10 cmH20   Rate Measured 18 br/min   Vt Exhaled 514 mL   Minute Volume 9.24 Liters   I:E Ratio 1:2.3   Cough/Sputum   Sputum How Obtained Endotracheal   Cough Productive   Sputum Amount Small   Sputum Color Creamy   Tenacity Thick   Breath Sounds   Right Upper Lobe Rhonchi   Right Middle Lobe Rhonchi   Right Lower Lobe Rhonchi   Left Upper Lobe Rhonchi   Left Lower Lobe Rhonchi   Additional Respiratory  Assessments   Cuff Pressure (cm H2O) 30 cm H2O   Alarm Settings   High Pressure Alarm 40 cmH2O   Press Low Alarm 8.5 cmH2O   Low Minute Volume Alarm 3 L/min   Apnea (secs) 20 secs   High Respiratory Rate 40 br/min   Low Exhaled Vt  300 mL   Patient Observation   Observations ambu bedside   ETT (adult)   No Placement Date or Time found. Type: Cuffed  Tube Size: 7.5 mm  Location: Oral  Placed By:  In place on arrival to facility  Measured From: Lips   Secured at 24 cm   Measured From Lips   ET Placement Left   Secured By Commercial tube rivera   Site Condition Dry   Cuff Pressure 30 cm H2O

## 2021-01-16 NOTE — PROGRESS NOTES
INPATIENT PULMONARY CRITICAL CARE PROGRESS NOTE      Reason for visit    Acute respiratory failure     SUBJECTIVE: Patient continues to be critically ill on mechanical vent to support when seen this morning, patient was on 30% oxygen with PEEP of 5 to maintain saturation, patient continues to be on IV sedation to maintain patient ventilator synchrony, patient was hypotensive and w as on IV pressors to maintain hemodynamics, does not have any increasing respite secretions endotracheal tube, patient was afebrile and had sinus rhythm on the monitor, patient's urine output is on the lower side with cumulative fluid balance of +1.8 L, patient's glycemic control was acceptable, no other pertinent review of system could be obtained      Physical Exam:  Blood pressure 98/69, pulse 71, temperature 98.1 °F (36.7 °C), resp. rate 18, height 5' 10\" (1.778 m), weight 170 lb 3.1 oz (77.2 kg), SpO2 100 %.'        Constitutional:  No acute distress on mechanical vent to support. HENT: Endotracheal tube present no thyromegaly. Eyes:  Conjunctivae are normal. Pupils equal, round, and reactive to light. No scleral icterus. Neck: . No tracheal deviation present. No obvious thyroid mass. Cardiovascular: Normal rate, regular rhythm, LLSB murmur. No right ventricular heave. No lower extremity edema. Pulmonary/Chest: No wheezes. Scattered rales. Chest wall is not dull to percussion. No accessory muscle usage or stridor. Decreased breath sound density  Abdominal: Soft. Bowel sounds present. No distension or hernia. No tenderness. Musculoskeletal: No cyanosis. No clubbing. No obvious joint deformity. Lymphadenopathy: No cervical or supraclavicular adenopathy. Skin: Skin is warm and dry. No rash or nodules on the exposed extremities.   Neurologic:  Intubated and encephalopathic         Results:  CBC:   Recent Labs     01/15/21  0630 01/16/21  0734   WBC 8.1 9.5   HGB 7.1* 6.7*   HCT 22.7* 21.0*   .7* 97.7 PLT see below 73*     BMP:   Recent Labs     01/15/21  0630 01/16/21  0405    139   K 5.9* 4.8  4.8   * 111*   CO2 22 21   PHOS 5.2* 4.0   BUN 81* 84*   CREATININE 2.5* 3.1*     LIVER PROFILE:   Recent Labs     01/15/21  0630 01/16/21  0405   AST 41* 37   ALT 23 20   BILITOT 0.7 0.7   ALKPHOS 73 59     PT/INR:   Recent Labs     01/15/21  0630   PROTIME 26.3*   INR 2.25*     APTT:   Recent Labs     01/16/21  0802   APTT 43.4*       Imaging:  I have reviewed radiology images personally. MRI BRAIN WO CONTRAST   Final Result   1. No acute intracranial abnormality. Specifically, no acute infarction. 2. Mild parenchymal volume loss. Minimal periventricular white matter signal   abnormality compatible with chronic microvascular ischemic changes. XR CHEST PORTABLE   Final Result   Persistent patchy multifocal nodular opacity within the mid to lower lung   zones which can reflect pneumonia in the appropriate clinical setting. Small   pleural effusions.            Echo Complete    Result Date: 12/24/2020 Transthoracic Echocardiography Report (TTE)  Demographics   Patient Name       Dorota Chavarria   Date of Study      12/24/2020         Gender              Male   Patient Number     [id number]         Date of Birth       1965   Visit Number       [id number]          Age                 60 year(s)   Accession Number   [id number]         Room Number         7984   Corporate ID       [id number]             Sonographer         Emma Young THELMA   Ordering Physician Michelle Siddiqui MD         Physician           Bean Vega MD, [facility]  Procedure Type of Study   TTE procedure:ECHOCARDIOGRAM COMPLETE 2D W DOPPLER W COLOR. Procedure Date Date: 12/24/2020 Start: 12:48 PM Study Location: [address] [place] [address] - Echo Lab Technical Quality: Limited visualization due to patient immobility. Indications:Endocarditis. Patient Status: Routine Height: 70 inches Weight: 145 pounds BSA: 1.82 m2 BMI: 20.81 kg/m2 BP: 125/70 mmHg  Conclusions   Summary  Technically difficult examination due to patient immobility. Normal LV systolic function with a visually estimated EF of 55%. Endocardium not entirely well visualized but no obvious segmental wall  motion abnormalities. Normal left ventricular diastolic filling pressure. 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 It measures approximately 1.3 by 1.1 cm. Mild tricuspid regurgitation. Systolic pulmonary artery pressure (SPAP) is normal and estimated at 27 mmHg  (right atrial pressure 3 mmHg).    Signature   ------------------------------------------------------------------  Electronically signed by Bean Vega MD, [facility]  (Interpreting physician) on 12/24/2020 at 02:00 PM  ------------------------------------------------------------------ Findings   Left Ventricle  Normal left ventricle size, wall thickness, and systolic function with a  visually estimated ejection fraction of 55%. Endocardium not entirely well visualized but no obvious segmental wall  motion abnormalities. Normal left ventricular diastolic filling pressure. Mitral Valve  The mitral valve is normal in structure and function. No evidence of mitral regurgitation. Left Atrium  The left atrium is normal in size. Aortic Valve  Individual aortic valve leaflets are not clearly visualized. No evidence of aortic valve regurgitation. Aorta  The aortic root is normal in size. Right Ventricle  The right ventricle is not well visualized but appears grossly normal in  size and function. 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 It measures approximately 1.3 by 1.1 cm. Mild tricuspid regurgitation. Systolic pulmonary artery pressure (SPAP) is normal and estimated at 27 mmHg  (right atrial pressure 3 mmHg). Right Atrium  The right atrium is not well visualized but appears grossly normal in size. Pulmonic Valve  The pulmonic valve leaflets are not well visualized. No evidence of pulmonic regurgitation. Pericardial Effusion  No pericardial effusion noted. Pleural Effusion  No pleural effusion noted. Miscellaneous  No obvious masses, thrombi, or vegetations are noted. IVC is normal in size (<2.1 cm) and collapses > 50% with respiration  consistent with normal right atrial pressure (3 mmHg).   M-Mode/2D Measurements (cm)   LV Diastolic Dimension: 5.4 cm LV Systolic Dimension: 3.8 cm  LV Septum Diastolic: 0.7 cm  LV PW Diastolic: 3.75 cm       AO Root Dimension: 3.5 cm                                 AV Cusp Separation: 2 cm                                 LA Dimension: 2.9 cm  LVOT: 1.9 cm                   LA Area: 20.2 cm2 LA volume/Index: 48 ml /26 ml/m2  Doppler Measurements   AV Peak Velocity: 133 cm/s     MV Peak E-Wave: 81.5 cm/s  AV Peak Gradient: 7.08 mmHg    MV Peak A-Wave: 64.5 cm/s  LVOT Peak Velocity: 117 cm/s   MV E/A Ratio: 1.26   TR Velocity:244 cm/s  TR Gradient:23.81 mmHg  Estimated RAP:3 mmHg  Estimated RVSP: 27 mmHg  E' Septal Velocity: 9.46 cm/s  E' Lateral Velocity: 13.6 cm/s  E/E' ratio: 7.3   Aortic Valve   Peak Velocity: 133 cm/s  Peak Gradient: 7.08 mmHg   Cusp Separation: 2 cm  Aorta   Aortic Root: 3.5 cm  LVOT Diameter: 1.9 cm      Ct Head Wo Contrast    Result Date: 12/23/2020 EXAMINATION: CT OF THE CERVICAL SPINE WITHOUT CONTRAST 12/23/2020 3:59 pm TECHNIQUE: CT of the cervical spine was performed without the administration of intravenous contrast. Multiplanar reformatted images are provided for review. Dose modulation, iterative reconstruction, and/or weight based adjustment of the mA/kV was utilized to reduce the radiation dose to as low as reasonably achievable. COMPARISON: None. HISTORY: ORDERING SYSTEM PROVIDED HISTORY: fall, neck pain TECHNOLOGIST PROVIDED HISTORY: If patient is on cardiac monitor and/or pulse ox, they may be taken off cardiac monitor and pulse ox, left on O2 if currently on. All monitors reattached when patient returns to room. Reason for exam:->fall, neck pain Reason for Exam: patient fell, posterior neck pain Acuity: Acute Type of Exam: Initial FINDINGS: BONES/ALIGNMENT: There is no acute fracture or traumatic malalignment. There is a minimal degenerative retrolisthesis of C4 on C5. Vertebral body alignment is otherwise normal.  Cervical vertebral body heights are normal. Facet joints are normally aligned. DEGENERATIVE CHANGES: There are multilevel degenerative changes. At C5-C6, there is disc space narrowing, discogenic sclerosis, spondylosis and a broad-based disc protrusion more prominent right paracentrally. There is mild mass effect on the cervical spinal cord and there is a severe right nerve root canal stenosis. At C6-C7, there is moderate disc space narrowing with spondylosis. SOFT TISSUES: There is no prevertebral soft tissue swelling. There are irregular cavitary nodules at the bilateral lung apices. Multilevel degenerative changes with no acute abnormality of the cervical spine. Irregular cavitary nodules at the bilateral lung apices. The CTA of the chest has been scheduled.      Mri Lumbar Spine W Wo Contrast    Result Date: 12/26/2020 EXAMINATION: MRI OF THE LUMBAR SPINE WITHOUT AND WITH CONTRAST  12/26/2020 11:03 am TECHNIQUE: Multiplanar multisequence MRI of the lumbar spine was performed without and with the administration of intravenous contrast. COMPARISON: None. HISTORY: ORDERING SYSTEM PROVIDED HISTORY: back pain TECHNOLOGIST PROVIDED HISTORY: Reason for exam:->back pain Initial evaluation. FINDINGS: BONES/ALIGNMENT: There appears to be a transitional lumbosacral segment, which for the purpose of this dictation will be labeled S1. The vertebral body heights appear maintained. There straightening of the normal lumbar lordosis. No significant spondylolisthesis. Intraosseous hemangiomas are seen within the T12 and L2 vertebral bodies. The bone marrow signal demonstrates no acute abnormality. SPINAL CORD:  The conus terminates at a normal level. SOFT TISSUES: No paraspinal mass identified. There is minimal edema within the posterior paraspinal soft tissues bilaterally predominately spanning the L4 through S1 levels. Minimal edema is seen between L4-L5 and L5-S1 spinous processes. L1-L2: There is no significant disc bulge, spinal canal stenosis or neural foraminal narrowing. L2-L3: There is no significant disc bulge, spinal canal stenosis or neural foraminal narrowing. L3-L4: There is a disc bulge contacting the ventral thecal sac. No significant spinal canal stenosis. Facet arthrosis without neural foraminal narrowing. L4-L5: There is a disc bulge with a central annular tear flattening the ventral thecal sac. No significant spinal canal stenosis. Facet arthrosis contributes to minimal bilateral neural foraminal narrowing. L5-S1: There is a disc bulge with a right foraminal annular tear. No spinal canal stenosis. Facet arthrosis contributes to mild left and minimal right neural foraminal narrowing. 1. Minimal edema is seen between the L4-L5 and L5-S1 spinous processes with edema in the posterior paraspinal musculature spanning the L4 through S1 levels. Unclear whether this is related to a prior injury or perhaps related to early Baastrup's disease. 2. Degenerative changes contribute to neural foraminal narrowing at L4-L5 and L5-S1. 3. No significant spinal canal stenosis.      Ct Abdomen Pelvis W Iv Contrast Additional Contrast? None    Result Date: 12/23/2020 EXAMINATION: CT OF THE ABDOMEN AND PELVIS WITH CONTRAST 12/23/2020 1:01 pm TECHNIQUE: CT of the abdomen and pelvis was performed with the administration of intravenous contrast. Multiplanar reformatted images are provided for review. Dose modulation, iterative reconstruction, and/or weight based adjustment of the mA/kV was utilized to reduce the radiation dose to as low as reasonably achievable. COMPARISON: 08/31/2017 HISTORY: ORDERING SYSTEM PROVIDED HISTORY: abdominal pain, sepsis TECHNOLOGIST PROVIDED HISTORY: If patient is on cardiac monitor and/or pulse ox, they may be taken off cardiac monitor and pulse ox, left on O2 if currently on. All monitors reattached when patient returns to room. Additional Contrast?->None Reason for exam:->abdominal pain, sepsis Reason for Exam: patient fell, severe abdominal pain Acuity: Acute Type of Exam: Initial Relevant Medical/Surgical History: right kidney removed FINDINGS: Lower Chest: Please see CT PA performed concurrently. 1 Organs: Evaluation is degraded by streak artifact generated by the patient's arms at his side. That said, the liver is unremarkable in appearance. Portal vein is patent. Gallbladder unremarkable. Moderate splenomegaly is noted. Normal adrenals. Normal pancreas. The right kidney is absent. Left kidney is unremarkable. GI/Bowel: No large bowel abnormalities are detected. Distal esophagus, stomach, duodenal sweep, and the remainder of the small bowel are unremarkable. No evidence of bowel obstruction. Pelvis: Prostate and urinary bladder unremarkable. No free pelvic fluid. Peritoneum/Retroperitoneum: Abdominal aorta normal in caliber. Superior mesenteric artery is enhancing. Mild atherosclerotic plaque identified. No retroperitoneal lymphadenopathy is seen. Bones/Soft Tissues: No acute or suspicious bony abnormality. Mild subcutaneous edema is noted throughout the abdomen pelvis. No acute abnormality detected within the abdomen and pelvis. Moderate splenomegaly. Absent right kidney. Xr Chest Portable    Result Date: 1/15/2021  EXAMINATION: ONE XRAY VIEW OF THE CHEST 1/15/2021 6:03 am COMPARISON: 12/23/2020 HISTORY: ORDERING SYSTEM PROVIDED HISTORY: resp failure TECHNOLOGIST PROVIDED HISTORY: Reason for exam:->resp failure FINDINGS: Endotracheal tube extends to the mid trachea. Nasogastric tube extends below the diaphragm. 2 chest tubes are seen extending to the right apex. No gross pneumothorax. Heterogeneous and nodular pulmonary opacity is again demonstrated within the mid to lower lung zones. Hazy opacity is seen near the costophrenic angles. Cardiac and mediastinal silhouettes are similar to prior. Persistent patchy multifocal nodular opacity within the mid to lower lung zones which can reflect pneumonia in the appropriate clinical setting. Small pleural effusions. Xr Chest Portable    Result Date: 12/23/2020  EXAMINATION: ONE XRAY VIEW OF THE CHEST 12/23/2020 2:17 pm COMPARISON: 06/04/2014 HISTORY: ORDERING SYSTEM PROVIDED HISTORY: cough TECHNOLOGIST PROVIDED HISTORY: Reason for exam:->cough Reason for Exam: cough, fatigue Acuity: Acute Type of Exam: Initial FINDINGS: There is patchy bilateral airspace disease either due to atelectasis or developing pneumonia. In addition, there is a 1.5 cm rounded opacity in the right upper lobe concerning for pulmonary nodule. Calcified granulomatous disease is noted. The cardiac silhouette is within normal limits. There is no pneumothorax or pleural effusion. 1. Patchy bibasilar airspace disease concerning for multifocal pneumonia rather than atelectasis. 2. 1.5 cm rounded right upper lobe pulmonary nodule. Recommend CT of the chest with contrast for further evaluation.      Ct Chest Pulmonary Embolism W Contrast    Result Date: 12/23/2020 EXAMINATION: CTA OF THE CHEST 12/23/2020 4:01 pm TECHNIQUE: CTA of the chest was performed after the administration of intravenous contrast.  Multiplanar reformatted images are provided for review. MIP images are provided for review. Dose modulation, iterative reconstruction, and/or weight based adjustment of the mA/kV was utilized to reduce the radiation dose to as low as reasonably achievable. COMPARISON: None. HISTORY: ORDERING SYSTEM PROVIDED HISTORY: chest pain, shortness of breath, IVDA TECHNOLOGIST PROVIDED HISTORY: Reason for exam:->chest pain, shortness of breath, IVDA Reason for Exam: patient fell, center chest pain, sob Acuity: Acute Type of Exam: Initial FINDINGS: Pulmonary Arteries: Pulmonary arteries are adequately opacified for evaluation. No evidence of intraluminal filling defect to suggest pulmonary embolism. Main pulmonary artery is normal in caliber. Mediastinum: Multiple prominent lymph nodes, likely reactive. The heart and pericardium demonstrate no acute abnormality. There is no acute abnormality of the thoracic aorta. Lungs/pleura: There multiple ground-glass opacities of the lungs bilaterally. Additionally, there are peripheral nodular and cavitary knee agents throughout the lungs bilaterally, most compatible with septic emboli. Upper Abdomen: Limited images of the upper abdomen are unremarkable. Soft Tissues/Bones: No acute bone or soft tissue abnormality. Findings most compatible with diffuse septic emboli. Superimposed bilateral lower lobe airspace opacities. No pulmonary arterial filling defect. Multiple prominent mediastinal lymph nodes, likely reactive.      Mri Abdomen W Wo Contrast Mrcp    Result Date: 12/26/2020 EXAMINATION: MRI OF THE ABDOMEN WITH AND WITHOUT CONTRAST AND MRCP 12/26/2020 11:32 am TECHNIQUE: Multiplanar multisequence MRI of the abdomen was performed with and without the administration of intravenous contrast.  After initial T2 axial and coronal images, thick slab, thin slab and 3D coronal MRCP sequences were obtained without the administration of intravenous contrast.  MIP images are provided for review. COMPARISON: 12/23/2020 HISTORY: ORDERING SYSTEM PROVIDED HISTORY: abnormal lfts. rule out biliary obstruction TECHNOLOGIST PROVIDED HISTORY: Reason for exam:->abnormal lfts. rule out biliary obstruction FINDINGS: Suboptimal post-contrast sequences as the patient had already been given gadolinium contrast material for lumbar spine MRI. Lower Chest: Nodular opacities at the lung bases, better characterized on recent CT Organs: Faintly nodular contour of the liver. Gallbladder is unremarkable without biliary ductal dilatation. Pancreas is unremarkable. Adrenals are unremarkable. Mild splenomegaly. The right kidney is absent. The left kidney is unremarkable. .  Vasculature is unremarkable. GI/Bowel: Bowel is non-dilated without wall thickening. Peritoneum/Retroperitoneum: New small volume ascites. No free air, organized fluid collection or lymphadenopathy. Bones: Multilevel degenerative disc disease. Suboptimal post-contrast imaging of the abdomen, as gadolinium contrast material had already been administered for lumbar spine MRI. 1. No biliary obstruction. 2. Faintly nodular contour of the liver. Recommend clinical correlation for diffuse liver disease. 3. New small volume ascites. 4. Mild splenomegaly of uncertain etiology.      Ir Picc Wo Sq Port/pump > 5 Years    Result Date: 12/27/2020 EXAMINATION: LIMITED ULTRASOUND OF THE ARM FOR PICC ACCESS, 12/26/2020 TECHNIQUE: The PICC team used ultrasound and VPS guidance to place a PICC line. HISTORY: ORDERING SYSTEM PROVIDED HISTORY: limited access TECHNOLOGIST PROVIDED HISTORY: Reason for exam:->limited access How many lumens are being requested?->1 What site is the preferred site? ->No preference What side should this line be placed? ->Either FLUOROSCOPY DOSE AND TYPE OR TIME AND EXPOSURES: Not used FINDINGS: Ultrasound images demonstrate patency of the right basilic which was used for access for placement of a PICC by the PICC team, without a radiologist present. VPS guidance was used by the PICC team By report, a single lumen right basilic 43 cm PICC was placed. Successful placement of PICC line.      Mri Brain Wo Contrast    Result Date: 1/15/2021 EXAMINATION: MRI OF THE BRAIN WITHOUT CONTRAST  1/15/2021 12:19 pm TECHNIQUE: Multiplanar multisequence MRI of the brain was performed without the administration of intravenous contrast. COMPARISON: Head CT 12/23/2020 HISTORY: ORDERING SYSTEM PROVIDED HISTORY: AMS, ?endocarditis TECHNOLOGIST PROVIDED HISTORY: This MRI will dictate the direction of critical care Reason for exam:->AMS, ?endocarditis Acuity: Acute Type of Exam: Unknown FINDINGS: INTRACRANIAL STRUCTURES/VENTRICLES: No acute infarction. No mass effect or midline shift. No acute intracranial hemorrhage. Mild parenchymal volume loss with enlargement of the ventricles and cerebral sulci. Minimal periventricular white matter T2/FLAIR hyperintense signal.  The sellar/suprasellar regions appear unremarkable. The normal signal voids within the major intracranial vessels appear maintained. ORBITS: The visualized portion of the orbits demonstrate no acute abnormality. SINUSES: Minimal mucosal thickening the paranasal sinuses. Left sphenoid sinus mucosal retention cyst or polyp. Trace fluid in the mastoid air cells. BONES/SOFT TISSUES: The bone marrow signal intensity appears normal. The soft tissues demonstrate no acute abnormality. Fluid in the pharynx. 1. No acute intracranial abnormality. Specifically, no acute infarction. 2. Mild parenchymal volume loss. Minimal periventricular white matter signal abnormality compatible with chronic microvascular ischemic changes.      Ct Lumbar Spine Trauma Reconstruction    Result Date: 12/23/2020 EXAMINATION: CT OF THE LUMBAR SPINE WITHOUT CONTRAST  12/23/2020 TECHNIQUE: CT of the lumbar spine was performed without the administration of intravenous contrast. Multiplanar reformatted images are provided for review. Dose modulation, iterative reconstruction, and/or weight based adjustment of the mA/kV was utilized to reduce the radiation dose to as low as reasonably achievable. COMPARISON: None HISTORY: ORDERING SYSTEM PROVIDED HISTORY: severe midline low back pain near L4 TECHNOLOGIST PROVIDED HISTORY: Reason for exam:->severe midline low back pain near L4 Reason for Exam: severe midline low back pain near L4 Acuity: Acute Type of Exam: Initial FINDINGS: BONES/ALIGNMENT: There is normal alignment of the spine. The vertebral body heights are maintained. No osseous destructive lesion is seen. DEGENERATIVE CHANGES: No significant degenerative changes of the lumbar spine. SOFT TISSUES/RETROPERITONEUM: No paraspinal mass is seen. Unremarkable non-contrast CT of the lumbar spine. Results for Karen Mckeon (MRN [id number]) as of 1/16/2021 10:46   Ref.  Range 1/16/2021 04:05 1/16/2021 04:05   Sodium Latest Ref Range: 136 - 145 mmol/L  139   Potassium Latest Ref Range: 3.5 - 5.1 mmol/L 4.8 4.8   Chloride Latest Ref Range: 99 - 110 mmol/L  111 (H)   CO2 Latest Ref Range: 21 - 32 mmol/L  21   BUN Latest Ref Range: 7 - 20 mg/dL  84 (HH)   Creatinine Latest Ref Range: 0.9 - 1.3 mg/dL  3.1 (H)   Anion Gap Latest Ref Range: 3 - 16   7   GFR Non- Latest Ref Range: >60   21 (A)   GFR  Latest Ref Range: >60   25 (A)   Magnesium Latest Ref Range: 1.80 - 2.40 mg/dL 2.00    Glucose Latest Ref Range: 70 - 99 mg/dL  97   Calcium Latest Ref Range: 8.3 - 10.6 mg/dL  8.6   Phosphorus Latest Ref Range: 2.5 - 4.9 mg/dL 4.0    Total Protein Latest Ref Range: 6.4 - 8.2 g/dL  6.1 (L)       Results for Karen Mckeon (MRN [id number]) as of 1/16/2021 10:46 Ref. Range 1/15/2021 06:30 1/16/2021 07:34   WBC Latest Ref Range: 4.0 - 11.0 K/uL 8.1 9.5   RBC Latest Ref Range: 4.20 - 5.90 M/uL 2.23 (L) 2.15 (L)   Hemoglobin Quant Latest Ref Range: 13.5 - 17.5 g/dL 7.1 (L) 6.7 (LL)   Hematocrit Latest Ref Range: 40.5 - 52.5 % 22.7 (L) 21.0 (L)   MCV Latest Ref Range: 80.0 - 100.0 fL 101.7 (H) 97.7   MCH Latest Ref Range: 26.0 - 34.0 pg 31.9 31.2   MCHC Latest Ref Range: 31.0 - 36.0 g/dL 31.3 31.9   MPV Latest Ref Range: 5.0 - 10.5 fL see below 9.6   RDW Latest Ref Range: 12.4 - 15.4 % 22.3 (H) 22.9 (H)   Platelet Count Latest Ref Range: 135 - 450 K/uL see below 73 (L)   Neutrophils % Latest Units: % 77.8 71.6   Lymphocyte % Latest Units: % 14.1 16.9   Monocytes % Latest Units: % 6.6 8.7   Eosinophils % Latest Units: % 1.2 1.8     Results for Edward Weaver (MRN [id number]) as of 1/16/2021 10:46   Ref. Range 12/31/2020 05:15 1/1/2021 06:00 1/15/2021 06:30 1/16/2021 08:02   Prothrombin Time Latest Ref Range: 10.0 - 13.2 sec 18.3 (H) 17.9 (H) 26.3 (H)    INR Latest Ref Range: 0.86 - 1.14  1.57 (H) 1.54 (H) 2.25 (H)    aPTT Latest Ref Range: 24.2 - 36.2 sec    43.4 (H)       Results for Edward Weaver (MRN [id number]) as of 1/16/2021 10:46   Ref.  Range 1/15/2021 08:53 1/15/2021 10:00 1/16/2021 04:14   Hemoglobin, Art, Extended Latest Ref Range: 13.5 - 17.5 g/dL   7.7 (L)   pH, Arterial Latest Ref Range: 7.350 - 7.450  7.198 (LL) 7.271 (L) 7.438   pCO2, Arterial Latest Ref Range: 35.0 - 45.0 mmHg 57.7 (H) 49.5 (H) 33.1 (L)   pO2, Arterial Latest Ref Range: 75.0 - 108.0 mmHg 131.7 (H) 103.2 120.1 (H)   HCO3, Arterial Latest Ref Range: 21.0 - 29.0 mmol/L 22.5 22.8 21.9   TCO2 (calc), Art Latest Ref Range: Not Established mmol/L 24 24 22.9   Base Excess, Arterial Latest Ref Range: -3.0 - 3.0 mmol/L -6 (L) -4 (L) -2.0   O2 Sat, Arterial Latest Ref Range: >92 % 98 97 98.1   O2 Content, Arterial Latest Ref Range: Not Established mL/dL   11 Methemoglobin, Arterial Latest Ref Range: <1.5 %   0.6   Carboxyhgb, Arterial Latest Ref Range: 0.0 - 1.5 %   0.6      Results for [facility] (MRN [id number]) as of 1/16/2021 10:46   Ref. Range 1/15/2021 20:37   Ammonia Latest Ref Range: 16 - 60 umol/L 44       Assessment:  Active Problems:    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) (HCC)    Endocarditis    Acute encephalopathy    Acute respiratory failure with hypoxia and hypercapnia (HCC)    HTN (hypertension), benign    IVDA (intravenous drug abuse) complicating pregnancy ([facility]    ABIOLA (acute kidney injury) ([facility]    Pulmonary infiltrates    Normocytic normochromic anemia    Septic shock (HCC)    Hypothyroidism    Thrombocytopenia (HCC)  Resolved Problems:    * No resolved hospital problems.  *          Plan:   Ventilatory support to keep saturation between 90 and 94%  Ventilator settings and waveforms reviewed  Ventilator changes made  IV sedation to maintain patient ventilator synchrony at this time  Neurochecks  MRI of the head was done which was negative for any septic emboli or any other pathology which was acute  Patient has been started on cefepime doxycycline and vancomycin by the admitting provider which can be continued for now  Patient has history of MRSA bacteremia in the past  Patient continues to have vegetation on tricuspid valve  Chest tube care  Patient was evaluated by nephrology and CRRT being contemplated but it may not change patient's prognosis which was discussed by nephrology with the family and patient's family wants to hold off on any renal replacement therapy for now  Pulmonary toilet  If patient's family wants to be aggressive then a bronchoscopy can be contemplated but will hold off for now  Ammonia level was normal  Synthroid started as patient has elevated TSH levels-titration as per clinical status and repeat labs  Monitor H&H closely If patient's hemoglobin drops below 7 patient may require packed RBC transfusion-will do so if aggressive care is being continued -discussed with nursing   Can be given bronchodilators on as needed basis  Blood glucose monitoring with sliding scale insulin  Patient is on lactulose which can be continued  Monitor input output and BMP  IV fluids and pressors to keep mean arterial pressure medicine 65 mmHg  Correct electrolytes and whenever necessary basis  PUD and DVT prophylaxis     Patient's overall clinical status is precarious and has potential for further decompensation  Patient's long-term prognosis is guarded  No CT surgery intervention being planned  Patient's family wants to hold off on any renal replacement therapy as discussed with nephrology     Case discussed with ICU team     Critical care time spent on the patient was 35 minutes exclusive of any procedures     Patient's clinical status and prognosis discussed with family -patient was made [facility] [employer] [facility] yesterday and will let us know about further course of care today      Further management depending on patient's clinical status and the family's decision about course of care            Electronically signed by:  Omar Little MD    1/16/2021    11:46 AM.

## 2021-01-16 NOTE — PROGRESS NOTES
[address], declined pt and OK to withdraw care. Call with TOD w/in 1 hr of cardiac death.  Antonio Teixeira

## 2021-01-16 NOTE — PROGRESS NOTES
PULM/CCM     After discussions patient's family wants to take comfort care approach  Patient's code status changed to [address] [facility] consult requested     Ramirze Martin MD

## 2021-01-16 NOTE — PROGRESS NOTES
Family at bedside with . Pt medicated with 2mg ativan and 2mg morphine ivp prior to extubation for comfort measures. Extubated to RA. Pt appears comfortable. Continue to monitor.  Shameka Hopkins

## 2021-01-16 NOTE — CARE COORDINATION
Called by ICU staff requesting referral be made to [facility] as Dr Glory Piper is placing hospice order. Referral made- please place order for liason to review chart. Addendum late entry: per hospice liason, pt became apneic when trialing weaning vent. Pt is not expected to survive long enough to partner once extubated. Comfort meds were provided and family visits are to be accomodated per ICU policies.

## 2021-01-16 NOTE — PROGRESS NOTES
01/15/21 1914   Vent Information   Skin Assessment Clean, dry, & intact   Vent Type 980   Vent Mode AC/VC+   Vt Ordered 500 mL   Rate Set 18 bmp   Peak Flow 50 L/min   Pressure Support 0 cmH20   FiO2  30 %   SpO2 99 %   SpO2/FiO2 ratio 330   Sensitivity 3   PEEP/CPAP 5   I Time/ I Time % 0.8 s   Humidification Source HME   Vent Patient Data   Peak Inspiratory Pressure 26 cmH2O   Mean Airway Pressure 10 cmH20   Rate Measured 18 br/min   Vt Exhaled 507 mL   Minute Volume 9.13 Liters   I:E Ratio 1:3.2   Cough/Sputum   Sputum How Obtained Endotracheal;Suctioned   Cough Strong;Productive   Sputum Amount Small   Sputum Color Yellow;Creamy   Tenacity Thick   Spontaneous Breathing Trial (SBT) RT Doc   Pulse 90   Breath Sounds   Right Upper Lobe Rhonchi   Right Middle Lobe Rhonchi   Right Lower Lobe Rhonchi   Left Upper Lobe Rhonchi   Left Lower Lobe Rhonchi   Additional Respiratory  Assessments   Resp 18   End Tidal CO2 22   Alarm Settings   High Pressure Alarm 40 cmH2O   Low Minute Volume Alarm 3 L/min   Apnea (secs) 20 secs   High Respiratory Rate 40 br/min   Low Exhaled Vt  300 mL   Patient Observation   Observations ambu @ bedside   ETT (adult)   No Placement Date or Time found. Type: Cuffed  Tube Size: 7.5 mm  Location: Oral  Placed By:  In place on arrival to facility  Measured From: Lips   Secured at 24 cm   Measured From [facility] [address] By [employer] tube rivera   Cuff Pressure 30 cm H2O

## 2021-01-16 NOTE — PROGRESS NOTES
Pt transferred from B2 to R 349. Pt not alert at this time. Chest tube draining with bloody output. Ativan administered for comfort. Mouth care provided. Son at bedside. Bed in lowest position, wheels locked, bed alarm on and audible.

## 2021-01-16 NOTE — PROGRESS NOTES
EXAMINATION: ONE XRAY VIEW OF THE CHEST 1/15/2021 6:03 am COMPARISON: 12/23/2020 HISTORY: ORDERING SYSTEM PROVIDED HISTORY: resp failure TECHNOLOGIST PROVIDED HISTORY: Reason for exam:->resp failure FINDINGS: Endotracheal tube extends to the mid trachea. Nasogastric tube extends below the diaphragm. 2 chest tubes are seen extending to the right apex. No gross pneumothorax. Heterogeneous and nodular pulmonary opacity is again demonstrated within the mid to lower lung zones. Hazy opacity is seen near the costophrenic angles. Cardiac and mediastinal silhouettes are similar to prior. Persistent patchy multifocal nodular opacity within the mid to lower lung zones which can reflect pneumonia in the appropriate clinical setting. Small pleural effusions.      Mri Brain Wo Contrast    Result Date: 1/15/2021 Normocytic normochromic anemia 1/15/2021 Yes    Septic shock ([facility] 1/15/2021 Yes    Hypothyroidism 1/15/2021 Yes    Thrombocytopenia ([facility] 1/16/2021 Yes         Impression:  43 yo male with HN, cirrhosis, chronic anemia, IVDA who follows with Dr. Yvonne Clark who presents with respiratory failure. Planning hospice care    Recommendation:  1. PLanning comfort care and hospice  2. Please let GI service know if can be of any assistance. Will sign off.       Lina Armijo DO  2:09 PM 1/16/2021            [facility]    [address]      [address] [facility] [place]     Phone: [phone]     Fax: [phone]

## 2021-01-16 NOTE — PROGRESS NOTES
Call to [facility]. Change in code status and plan to withdraw care.  Agustín Perez

## 2021-01-16 NOTE — PROGRESS NOTES
01/16/21 0729   Vent Information   Equipment Changed HME   Vent Type 980   Vent Mode AC/VC+   Vt Ordered 500 mL   Rate Set 18 bmp   Peak Flow 50 L/min   FiO2  30 %   SpO2 100 %   SpO2/FiO2 ratio 333.33   Sensitivity 3   PEEP/CPAP 5   I Time/ I Time % 1 s   Humidification Source HME   Vent Patient Data   Peak Inspiratory Pressure 19 cmH2O   Mean Airway Pressure 9.1 cmH20   Rate Measured 18 br/min   Vt Exhaled 515 mL   Minute Volume 9.2 Liters   I:E Ratio 1:2.3   Cough/Sputum   Sputum How Obtained Endotracheal   Cough Productive   Sputum Amount Small   Sputum Color Creamy   Tenacity Thick   Breath Sounds   Right Upper Lobe Rhonchi   Right Middle Lobe Rhonchi   Right Lower Lobe Rhonchi   Left Upper Lobe Rhonchi   Left Lower Lobe Rhonchi   Alarm Settings   High Pressure Alarm 40 cmH2O   Press Low Alarm 8.5 cmH2O   Low Minute Volume Alarm 3 L/min   Apnea (secs) 20 secs   High Respiratory Rate 40 br/min   Low Exhaled Vt  300 mL   Patient Observation   Observations ambu bedside   ETT (adult)   No Placement Date or Time found. Type: Cuffed  Tube Size: 7.5 mm  Location: Oral  Placed By:  In place on arrival to facility  Measured From: Lips   Secured at 24 cm   Measured From Lips   ET Placement Left   Secured By [employer] tube rivera   Site Condition Dry   Cuff Pressure 30 cm H2O

## 2021-01-16 NOTE — PROGRESS NOTES
Hospitalist Progress Note      PCP: No primary care provider on file. Date of Admission: 1/15/2021    Chief Complaint: Unknown    Subjective: no new c/o. Transitioning to comfort care.        Medications:  Reviewed    Infusion Medications    norepinephrine 5 mcg/min (01/15/21 1712)    vasopressin (Septic Shock) infusion      sodium bicarbonate infusion 50 mL/hr at 01/16/21 0620    fentaNYL (SUBLIMAZE) infusion 50 mcg/hr (01/16/21 0324)    dexmedetomidine      dextrose      prismaSATE BGK 4/2.5      prismaSATE BGK 4/2.5      prismaSATE BGK 4/2.5      sodium chloride 100 mL/hr at 01/15/21 1508     Scheduled Medications    sodium chloride flush  10 mL Intravenous 2 times per day    cefepime  2 g Intravenous Q12H    metroNIDAZOLE  500 mg Intravenous Q8H    doxycycline (VIBRAMYCIN) IV  100 mg Intravenous Q12H    lactulose  20 g Per NG tube TID    sodium chloride  1,000 mL Intravenous Once    insulin lispro  0-6 Units Subcutaneous Q4H    famotidine (PEPCID) injection  20 mg Intravenous Daily    mupirocin   Nasal BID    vancomycin (VANCOCIN) intermittent dosing (placeholder)   Other RX Placeholder    heparin (porcine)  5,000 Units Subcutaneous 3 times per day    levothyroxine  50 mcg Oral Daily     PRN Meds: sodium chloride flush, promethazine **OR** ondansetron, polyethylene glycol, acetaminophen **OR** acetaminophen, glucose, dextrose, glucagon (rDNA), dextrose, potassium chloride, magnesium sulfate, calcium gluconate **OR** calcium gluconate **OR** calcium gluconate **OR** calcium gluconate, sodium phosphate IVPB **OR** sodium phosphate IVPB **OR** sodium phosphate IVPB **OR** sodium phosphate IVPB      Intake/Output Summary (Last 24 hours) at 1/16/2021 0842  Last data filed at 1/16/2021 0800  Gross per 24 hour   Intake 2299.72 ml   Output 450 ml   Net 1849.72 ml       Physical Exam Performed: BP 98/69   Pulse 79   Temp 98.6 °F (37 °C)   Resp 18   Ht 5' 10\" (1.778 m)   Wt 170 lb 3.1 oz (77.2 kg)   SpO2 100%   BMI 24.42 kg/m²     General appearance: No apparent distress, appears stated age. Intubated/sedated. HEENT: Pupils equal, round, and reactive to light. Conjunctivae/corneas clear. Neck: Supple, with full range of motion. No jugular venous distention. Trachea midline. Respiratory:  Normal respiratory effort. Clear to auscultation, bilaterally without Rales/Wheezes/Rhonchi. Cardiovascular: Regular rate and rhythm with normal S1/S2 without murmurs, rubs or gallops. Abdomen: Soft, non-tender, non-distended with normal bowel sounds. Musculoskeletal: No clubbing, cyanosis or edema bilaterally. Full range of motion without deformity. Skin: Skin color, texture, turgor normal.  No rashes or lesions.   Capillary Refill: Brisk,< 3 seconds   Peripheral Pulses: +2 palpable, equal bilaterally       Labs:   Recent Labs     01/15/21  0630 01/16/21  0734   WBC 8.1 9.5   HGB 7.1* 6.7*   HCT 22.7* 21.0*   PLT see below 73*     Recent Labs     01/15/21  0630 01/16/21  0405    139   K 5.9* 4.8  4.8   * 111*   CO2 22 21   BUN 81* 84*   CREATININE 2.5* 3.1*   CALCIUM 8.6 8.6   PHOS 5.2* 4.0     Recent Labs     01/15/21  0630 01/16/21  0405   AST 41* 37   ALT 23 20   BILITOT 0.7 0.7   ALKPHOS 73 59     Recent Labs     01/15/21  0630   INR 2.25*     Recent Labs     01/15/21  0630 01/15/21  1618   CKTOTAL 36*  --    TROPONINI  --  <0.01       Urinalysis:      Lab Results   Component Value Date    NITRU POSITIVE 12/23/2020    WBCUA 21-50 12/23/2020    BACTERIA 3+ 12/23/2020    RBCUA 3-4 12/23/2020    BLOODU SMALL 12/23/2020    SPECGRAV 1.015 12/23/2020    GLUCOSEU Negative 12/23/2020       Consults:    IP CONSULT TO PULMONOLOGY  IP CONSULT TO INFECTIOUS DISEASES  PHARMACY TO DOSE VANCOMYCIN  IP CONSULT TO CARDIOTHORACIC SURGERY  IP CONSULT TO PALLIATIVE CARE  IP CONSULT TO CARDIOLOGY

## 2021-01-16 NOTE — PROGRESS NOTES
01/16/21 0103   Vent Information   Vent Type 980   Vent Mode AC/VC+   Vt Ordered 500 mL   Rate Set 18 bmp   FiO2  30 %   SpO2 100 %   SpO2/FiO2 ratio 333.33   Sensitivity 3   PEEP/CPAP 5   I Time/ I Time % 1 s   Humidification Source HME   Vent Patient Data   Peak Inspiratory Pressure 21 cmH2O   Mean Airway Pressure 9.7 cmH20   Rate Measured 18 br/min   Vt Exhaled 511 mL   Minute Volume 9.21 Liters   I:E Ratio 1:2.30   Cough/Sputum   Sputum How Obtained Endotracheal   Cough Productive   Sputum Amount Large   Sputum Color Yellow;Creamy   Tenacity Thick   Spontaneous Breathing Trial (SBT) RT Doc   Pulse 97   Breath Sounds   Right Upper Lobe Rhonchi   Right Middle Lobe Diminished   Right Lower Lobe Diminished   Left Upper Lobe Rhonchi   Left Lower Lobe Diminished   Additional Respiratory  Assessments   Cuff Pressure (cm H2O) 30 cm H2O   Alarm Settings   High Pressure Alarm 40 cmH2O   Press Low Alarm 8.5 cmH2O   Low Minute Volume Alarm 3 L/min   High Respiratory Rate 40 br/min   Low Exhaled Vt  300 mL   ETT (adult)   No Placement Date or Time found. Type: Cuffed  Tube Size: 7.5 mm  Location: Oral  Placed By:  In place on arrival to facility  Measured From: Lips   Secured at 24 cm   Measured From Lips   ET Placement Left   Secured By [employer] tube rivera   Site Condition Dry   Cuff Pressure 30 cm H2O

## 2021-01-17 PROBLEM — I33.0 INFECTIVE ENDOCARDITIS: Status: ACTIVE | Noted: 2021-01-01

## 2021-01-17 NOTE — PROGRESS NOTES
Hospice [facility]    Call out to daughter Asif Cote regarding hospice. Patient stable to transfer and Asif Cote gave consent for [facility]. She said that patient does not have a POA. NOK 5 children. She was informed that RN would need consent from 3 children. Call from [place] and \"Darren\" Debbie Raphael who also provided consent. Did talk to Lexis, another daughter who did not consent. Daughter Asif Cote was able to e-sign consents on behalf of family as 3 of 5 are consenting to hospice. Transport set up for 1130 from [employer]. Updated staff nurse and discharge orders obtained. Report called to the [facility] staff nurse. Updated KARLEY Perez. 12:15pm Call from patient's daughter Lexis who said that one of the consenting adults was NOT \"Darren\" Elbert Butter as he said. She said the real \"darren\" lives in [place]. Therefore consent only from 2 of the 5 children. Lexis stated that Ratna's boyfriend had acted like he was Elbert Butter \"darren\" in order to get patient to hospice. She said that she and Darren (who lives in [place]) wants patient to have treatment continued-IV antibiotics, whatever and that she could never forgive herself for not \"trying\". Did have conversation with her earlier to try and explain the situation, but she still feels the need to continue antibiotics and treatment in hope that patient can improve. Call and updated [facility] They will not accept patient and will send him back to ED. Call and notified KARLEY Perez.

## 2021-01-17 NOTE — PROGRESS NOTES
Hospitalist Progress Note      PCP: No primary care provider on file. Date of Admission: 1/15/2021    Chief Complaint: Unknown    Subjective: no new c/o. Transitioning to comfort care. Medications:  Reviewed    Infusion Medications     Scheduled Medications    sodium chloride flush  10 mL Intravenous 2 times per day     PRN Meds: morphine **OR** morphine, LORazepam **OR** LORazepam, sodium chloride flush, promethazine **OR** ondansetron, acetaminophen **OR** [DISCONTINUED] acetaminophen      Intake/Output Summary (Last 24 hours) at 1/17/2021 0823  Last data filed at 1/17/2021 0309  Gross per 24 hour   Intake 20 ml   Output 325 ml   Net -305 ml       Physical Exam Performed:    /84   Pulse 90   Temp 97.8 °F (36.6 °C) (Axillary)   Resp 24   Ht 5' 10\" (1.778 m)   Wt 170 lb 3.1 oz (77.2 kg)   SpO2 (!) 89%   BMI 24.42 kg/m²     General appearance: No apparent distress, appears stated age. Intubated/sedated. HEENT: Pupils equal, round, and reactive to light. Conjunctivae/corneas clear. Neck: Supple, with full range of motion. No jugular venous distention. Trachea midline. Respiratory:  Normal respiratory effort. Clear to auscultation, bilaterally without Rales/Wheezes/Rhonchi. Cardiovascular: Regular rate and rhythm with normal S1/S2 without murmurs, rubs or gallops. Abdomen: Soft, non-tender, non-distended with normal bowel sounds. Musculoskeletal: No clubbing, cyanosis or edema bilaterally. Full range of motion without deformity. Skin: Skin color, texture, turgor normal.  No rashes or lesions.   Capillary Refill: Brisk,< 3 seconds   Peripheral Pulses: +2 palpable, equal bilaterally       Labs:   Recent Labs     01/15/21  0630 01/16/21  0734   WBC 8.1 9.5   HGB 7.1* 6.7*   HCT 22.7* 21.0*   PLT see below 73*     Recent Labs     01/15/21  0630 01/16/21  0405    139   K 5.9* 4.8  4.8   * 111*   CO2 22 21   BUN 81* 84*   CREATININE 2.5* 3.1*   CALCIUM 8.6 8.6 PHOS 5.2* 4.0     Recent Labs     01/15/21  0630 01/16/21  0405   AST 41* 37   ALT 23 20   BILITOT 0.7 0.7   ALKPHOS 73 59     Recent Labs     01/15/21  0630   INR 2.25*     Recent Labs     01/15/21  0630 01/15/21  1618   CKTOTAL 36*  --    TROPONINI  --  <0.01       Urinalysis:      Lab Results   Component Value Date    NITRU POSITIVE 12/23/2020    WBCUA 21-50 12/23/2020    BACTERIA 3+ 12/23/2020    RBCUA 3-4 12/23/2020    BLOODU SMALL 12/23/2020    SPECGRAV 1.015 12/23/2020    GLUCOSEU Negative 12/23/2020       Consults:    IP CONSULT TO PULMONOLOGY  IP CONSULT TO INFECTIOUS DISEASES  PHARMACY TO DOSE VANCOMYCIN  IP CONSULT TO CARDIOTHORACIC SURGERY  IP CONSULT TO NEPHROLOGY  IP CONSULT TO PHARMACY      Assessment/Plan:    Active Hospital Problems    Diagnosis    [facility] [D69.6]    ABIOLA (acute kidney injury) ([facility] [N17.9]    Pulmonary infiltrates [R91.8]    Normocytic normochromic anemia [D64.9]    Septic shock ([facility] [A41.9, R65.21]    Hypothyroidism [E03.9]    Acute encephalopathy [G93.40]    Acute respiratory failure with hypoxia and hypercapnia ([facility] [J96.01, J96.02]    HTN (hypertension), benign [I10]    IVDA (intravenous drug abuse) complicating pregnancy ([facility] [O99.320, F19.10]    Endocarditis [I38]    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) ([facility] [B19.20, K74.69]           Hepatic encephalopathy - per GI note, (Stage 1) in setting of acute infective endocarditis.  Started Lactulose and resolved - continue.  Neurology consulted and appreciated.     Endocarditis - acute bacterial w/ Staph likely 2nd to hx IVDU.  W/ septic pulmonary emboli due to  R-sided endocarditis.  Vancomycin started last admission 23 Dec. Echo 24 Dec w/ preserved EF 55% and large mobile vegetation on tricuspid valve.  Infectious Disease consulted last admission - reconsulted and appreciated s/p PICC placed 26 Dec.  Cardiology/CT surgery consulted but no role for acute surgical intervention or MAXI since acute surgery not indicated. C4/7 discitis noted. Sepsis - w/ septic shock POArrival.  Leukocytosis/Tachycardia/Fever/Elevated Lactate POArrival 2nd to above infection. Continue IVF as appropriate and monitor clinical response w/ ABX as written. Acute Respiratory Failure - w/ hypoxia, POArrival.  Supplemental O2 and wean as tolerated. Intubated and Pulmonology following - extubated 16 Jan. R multiloculated pleual effusion noted.          Cirrhosis - w/ decompensation. GI consulted and appreciated now signed off and agree    ARF/CKD - w/ elevated BUN/Cr ratio c/w pre-renal azotemia 2nd to sepsis - s/p prior R Nephrectomy. Given IVF hydration and follow serial labs. Reviewed and documented as above. Nephrology consulted and appreciated.           Anemia - etiology clinically unable to determine, w/out evidence of active bleeding/hemolysis. Stable and asymptomatic w/out indication for transfusion.          DVT Prophylaxis: SQ Heparin/IPC  Diet: No diet orders on file  Code Status: DNR-CC      PT/OT Eval Status: not yet ordered. Dispo - Transferred to floor. Transitioned to comfort care.      Marlene You MD

## 2021-01-17 NOTE — PROGRESS NOTES
Transportation here to get pt. Daughter Lexis called and stated she is not ok with pt going to hospice. However, pt's children Sanjuanita Brown and Elver all gave consent for pt to go to hospice. Ativan and morphine administered for comfort prior to transfer.

## 2021-01-17 NOTE — CARE COORDINATION
Pamela to advise that pt has been moved to [facility] and family wishes to make a plan to spend time with pt. [facility] of [address] [place] [address] states she is en route to help make a plan.

## 2021-01-17 NOTE — PROGRESS NOTES
01/17/21 1523   Oxygen Therapy/Pulse Ox   O2 Therapy Oxygen humidified   $Oxygen $Daily Charge   O2 Device High flow nasal cannula   O2 Flow Rate (L/min) 15 L/min   Resp 19   SpO2 98 %   $Pulse Oximeter $Spot check (multiple/continuous)

## 2021-01-17 NOTE — H&P
Hospital Medicine History & Physical      PCP: No primary care provider on file. Date of Admission: 1/17/2021    Date of Service: Pt seen/examined on 1/21 and Admitted to Inpatient with expected LOS greater than two midnights due to medical therapy. Chief Complaint:    Patient not communicative but clearly moribund. History Of Present Illness:   Nimo Arriaga is a 60 y.o. male  with a history of hepatitis B , hepatitis C , MRSA bacteremia in December 2020, IV drug use, right nephrectomy, tobacco use disorder, cirrhosis. He was initially admitted on 01/15/21 as a transfer from [address] [place] [address] for septic shock secondary to endocarditis, discitis osteomyelitis, myositis and spontaneous bacterial peritonitis. He was transferred for possible cardiothoracic surgery is not offered at the hospital in [address] [place] [address]. Was admitted to [facility] intubated already and in an encephalopathic state. Pretransfer he had a CT head that was negative, had a lumbar puncture CSF studies still pending. He was treated with antibiotics for MRSA bacteremia prompting a repeat echocardiogram showed a 2.3 x 1 cm vegetation patient was transferred to [facility]. Due to his comorbidities and poor functional state decision was made to emphasize more on quality of life measures and a curative approach and hospice was asked for. He was discharged to hospice today but apparently family have changed and asked for the patient to be readmitted and managed for his conditions. At the time of seeing the patient there was no family at the bedside, the lead clinical nurse had spoken to a family members and the plan is for DNR CC but with care to keep patient comfortable, no labs and for treatment. Pasted below is a discussion between Jaleesa Mazariegos the lead clinical nurse and the daughter;    Received call from daughter Judit Mendenhall. Called from [phone].  Confirms that she is agreeable to [facility] status ( no intubation, no CPR, no ACLS meds). Also agreeable to no lab draws and focus on comfort with control or pain and anxiety. Would prefer that patient go to hospice as visitation status is more lax. Also understands that patient sons prefer that patient stay in hospital. Huber Jimenez will discuss with family re hospice setting and hopes to achieve family agreement. Will notify nursing staff if hospice becomes family plan. He was intubated for respiratory failure . of note is that patient was extubated on 1/16/21  Still has ongoing hepatic encephalopathy  Sepsis with shock-ongoing with tachycardia , hypotension , tachypnea , bacteremia   Acute respiratory failure-still tachypneic and hypoxic   Decompensated cirrhosis   Infective endocarditis staphylococcal bacteremia likely secondary to IV drug use -implicated by septic pulmonary emboli-was on vancomycin since 12/23/20, feet echo cardiogram showed enlarged tricuspid valve vegetation . neurology and cardiothoracic surgeons seen -no cardiothoracic surgery at now. Acute on chronic renal failure-already on board            Past Medical History:          Diagnosis Date    Back pain     Hepatitis B surface antigen positive 08/31/2017    Hepatitis C antibody positive in blood 08/31/2017    MRSA (methicillin resistant staph aureus) culture positive 12/25/2020    bactermia       Past Surgical History:          Procedure Laterality Date    KIDNEY REMOVAL Right     WRIST SURGERY         Medications Prior to Admission:      Prior to Admission medications    Not on File       Allergies:  Codeine    Social History:      The patient currently lives -patient uncommunicative. TOBACCO:   reports that he has been smoking cigarettes. He has been smoking about 1.00 pack per day. He has never used smokeless tobacco.  ETOH:   reports no history of alcohol use.   E-Cigarettes/Vaping Use     Questions Responses    E-Cigarette/Vaping Use Never User    Start Date     Passive Exposure     Quit Date     Counseling Given     Comments             Family History:       Reviewed in detail and negative for DM, CAD, Cancer, CVA. Positive as above:    No family history on file. REVIEW OF SYSTEMS:   Pertinent positives as noted in the HPI. All other systems reviewed and negative. PHYSICAL EXAM PERFORMED:    BP 99/62   Pulse 117   Temp 97.8 °F (36.6 °C) (Core)   Resp (!) 39   SpO2 92%     General appearance: Gravely ill /acutely unwell . communicative . HEENT:  Normal cephalic, atraumatic without obvious deformity. Pupils equal, round, and reactive to light. Extra ocular muscles intact. Conjunctivae/corneas clear. Poor dentition  Neck: Supple, with full range of motion. No jugular venous distention. Trachea midline. Respiratory: Tachypneic , sonorous, crackles bilaterally with transmitted upper airway sounds and reduced breath sounds right. Cardiovascular:  Regular rate and rhythm with normal S1/S2 without murmurs, rubs or gallops. Abdomen: Soft, non-tender, non-distended with normal bowel sounds. Musculoskeletal:  No clubbing, cyanosis or edema bilaterally. Full range of motion without deformity. Skin: Skin color, texture, turgor normal.  No rashes or lesions. Neurologic: Encephalopathic /hypersomnolent /poorly responsive to pain and voice .   Psychiatric:  Alert and oriented, thought content appropriate, normal insight  Capillary Refill: Brisk,< 3 seconds   Peripheral Pulses: +2 palpable, equal bilaterally     Lines and drains  Right femoral line  Chest drain  Gayle catheter placed  Triple-lumen CVP line          Labs:     Recent Labs     01/15/21  0630 01/16/21  0734   WBC 8.1 9.5   HGB 7.1* 6.7*   HCT 22.7* 21.0*   PLT see below 73*     Recent Labs     01/15/21  0630 01/16/21  0405    139   K 5.9* 4.8  4.8   * 111*   CO2 22 21   BUN 81* 84*   CREATININE 2.5* 3.1*   CALCIUM 8.6 8.6   PHOS 5.2* 4.0     Recent Labs     01/15/21  0630 01/16/21  0405   AST 41* 37 ALT 23 20   BILITOT 0.7 0.7   ALKPHOS 73 59     Recent Labs     01/15/21  0630   INR 2.25*     Recent Labs     01/15/21  0630 01/15/21  1618   CKTOTAL 36*  --    TROPONINI  --  <0.01       Urinalysis:      Lab Results   Component Value Date    NITRU POSITIVE 12/23/2020    WBCUA 21-50 12/23/2020    BACTERIA 3+ 12/23/2020    RBCUA 3-4 12/23/2020    BLOODU SMALL 12/23/2020    SPECGRAV 1.015 12/23/2020    GLUCOSEU Negative 12/23/2020       Radiology:     CXR: I have reviewed the CXR with the following interpretation: 1/15-persistent patchy multifocal nodular opacity within the mid to lower lung zones. EKG:  I have reviewed the EKG with the following interpretation: Sinus tachycardia 120/min    No orders to display       ASSESSMENT:  1. Hepatic encephalopathy -cysts  -Continue lactulose  -Primary team to decide in the morning if neurology and GI need to be reconsulted based on what the family wants for this gentleman. 2.  Infective endocarditis-Complicated by septic pulmonary emboli.  -Has known MRSA bacteremia  -We will restart vancomycin, pharmacy dosed cefepime  -Antibiotics to be discontinued if family or next of kin decide exactly what the goals of care are.  -Cardiology/CT surgery consulted but no role for acute surgical intervention or MAXI since acute surgery not indicated. C4/7 discitis noted.      3. Sepsis - w/ resolving septic shock- No Leukocytosis/Fever . Has tachycardia/tachypnea/ hypotension/ multi-organ failure. Will fluid resuscitate    4. Acute Respiratory Failure - w/ hypoxia, POArrival.  Supplemental O2 and wean as tolerated. will reconsult Pulmonology    5.  R multiloculated pleual effusion noted. Right intercostal chest drain in situ.         6. Cirrhosis - w/ decompensation. GI to be reconsulted in the morning if POA wish.     7. ARF/CKD -likely a complication of sepsis/ prerenal  COVID-19 positive test (U07.1, COVID-19) with Viral Sepsis (A41.89 other specified sepsis)  (If respiratory failure present, add as separate assessment)  History of  R Nephrectomy.        8. Anemia - etiology clinically unable to determine, w/out evidence of active bleeding/hemolysis. Stable and asymptomatic w/out indication for transfusion. DVT Prophylaxis: Heparin  Diet: No diet orders on file  Code Status: DNR CC but want him fully treated  PT/OT Eval Status: Not applicable    Dispo -keep in until family decides the goals of care i.e. curative or hospice/palliative care       [facility] MD AVILA    Thank you No primary care provider on file. for the opportunity to be involved in this patient's care. If you have any questions or concerns please feel free to contact me at [phone].

## 2021-01-17 NOTE — DISCHARGE INSTR - COC
Continuity of Care Form    Patient Name: Crisoforo Goldberg   :  1965  MRN:  [id number]    Admit date:  1/15/2021  Discharge date:  2021    Code Status Order: DNR-CC   Advance Directives:      Admitting Physician:  Petey Harden MD  PCP: No primary care provider on file. Discharging Nurse: [facility] Unit/Room#: 9232/2441-86  Discharging Unit Phone Number: [phone]    Emergency Contact:   Extended Emergency Contact Information  Primary Emergency Contact: Nayely Starkey Phone: [phone]  Mobile Phone: [phone]  Relation: None  Secondary Emergency Contact: Jl Mckenziemelelisetedna Grey Phone: [phone]  Relation: Child  Preferred language: English   needed? No    Past Surgical History:  Past Surgical History:   Procedure Laterality Date    KIDNEY REMOVAL Right     WRIST SURGERY         Immunization History: There is no immunization history for the selected administration types on file for this patient.     Active Problems:  Patient Active Problem List   Diagnosis Code    Gastroenteritis K52.9    Nausea & vomiting R11.2    Leukocytosis D72.829    Marijuana smoker F12.90    Acute septic pulmonary embolus without acute cor pulmonale (HCC) I26.90    Septic embolism (HCC) I76    Elevated liver enzymes R74.8    Chronic hepatitis C without hepatic coma (HCC) B18.2    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) (HCC) B19.20, K74.69    Hepatic encephalopathy (HCC) K72.90    Endocarditis I38    Acute encephalopathy G93.40    Acute respiratory failure with hypoxia and hypercapnia (HCC) J96.01, J96.02    HTN (hypertension), benign I10    IVDA (intravenous drug abuse) complicating pregnancy ([facility] [address] O99.320, F19.10    ABIOLA (acute kidney injury) ([facility] [address] N17.9    Pulmonary infiltrates R91.8    Normocytic normochromic anemia D64.9    Septic shock (HCC) A41.9, R65.21    Hypothyroidism E03.9    Thrombocytopenia (HCC) D69.6 Isolation/Infection:   Isolation            No Isolation          Patient Infection Status       Infection Onset Added Last Indicated Last Indicated By Review Planned Expiration Resolved Resolved By    None active    Resolved    COVID-19 Rule Out 01/01/21 01/01/21 01/01/21 COVID-19 (Ordered)   01/01/21 Rule-Out Test Resulted    MRSA 12/25/20 12/29/20 12/25/20 Culture, Blood 1   12/29/20 Morena Rhodes RN    MRSA 12/25/20 12/28/20 12/25/20 Culture, Blood 1   12/28/20 Sandra Muñoz RN    COVID-19 Rule Out 12/23/20 12/23/20 12/23/20 COVID-19 (Ordered)   12/23/20 Rule-Out Test Resulted            Nurse Assessment:  Last Vital Signs: /84   Pulse 90   Temp 97.8 °F (36.6 °C) (Axillary)   Resp 24   Ht 5' 10\" (1.778 m)   Wt 170 lb 3.1 oz (77.2 kg)   SpO2 (!) 89%   BMI 24.42 kg/m²     Last documented pain score (0-10 scale): Pain Level: 3  Last Weight:   Wt Readings from Last 1 Encounters:   01/15/21 170 lb 3.1 oz (77.2 kg)     Mental Status:  ***    IV Access:  Femoral line - triple lumen     Nursing Mobility/ADLs:  Walking   Dependent  Transfer  Dependent  Bathing  Dependent  Dressing  Dependent  Toileting  Dependent  Feeding  Dependent  Med Admin  Dependent  Med Delivery   none    Wound Care Documentation and Therapy:        Elimination:  Continence:   · Bowel: No  · Bladder: No  Urinary Catheter: vazquez   Colostomy/Ileostomy/Ileal Conduit: No       Date of Last BM:     Intake/Output Summary (Last 24 hours) at 1/17/2021 1015  Last data filed at 1/17/2021 0309  Gross per 24 hour   Intake 20 ml   Output 325 ml   Net -305 ml     I/O last 3 completed shifts:   In: 1724.7 [I.V.:1024.7; IV ZRRBQVMCQ:074]  Out: 325 [Urine:275; Chest Tube:50]    Safety Concerns:     Aspiration Risk    Impairments/Disabilities:      None    Nutrition Therapy:  Current Nutrition Therapy:   - ***    Routes of Feeding: None  Liquids: No Restrictions  Daily Fluid Restriction: no Last Modified Barium Swallow with Video (Video Swallowing Test): not done    Treatments at the Time of Hospital Discharge:   Respiratory Treatments: ***  Oxygen Therapy:  {Therapy; copd oxygen:06948:::0}  Ventilator:    {[facility] Vent List:221115471:::0}    Rehab Therapies: {THERAPEUTIC INTERVENTION:8522708928}  Weight Bearing Status/Restrictions: { CC Weight Bearin:::0}  Other Medical Equipment (for information only, NOT a DME order):  {EQUIPMENT:585855490}  Other Treatments: ***    Patient's personal belongings (please select all that are sent with patient):  {CHP DME Belongings:187202706:::0}    RN SIGNATURE:  {Esignature:493905338:::0}    CASE MANAGEMENT/SOCIAL WORK SECTION    Inpatient Status Date: ***    Readmission Risk Assessment Score:  Readmission Risk              Risk of Unplanned Readmission:        23           Discharging to Facility/ Agency   · Name:   · Address:  · Phone:  · Fax:    Dialysis Facility (if applicable)   · Name:  · Address:  · Dialysis Schedule:  · Phone:  · Fax:    / signature: {Esignature:853514560:::0}    PHYSICIAN SECTION    Prognosis: Poor    Condition at Discharge: Terminal    Rehab Potential (if transferring to Rehab): Poor    Recommended Labs or Other Treatments After Discharge: None    Physician Certification: I certify the above information and transfer of Brian Black  is necessary for the continuing treatment of the diagnosis listed and that he requires Hospice for less 30 days.      Update Admission H&P: No change in H&P    PHYSICIAN SIGNATURE:  Electronically signed by Anthony Richmond MD on 21 at 10:15 AM EST

## 2021-01-17 NOTE — PROGRESS NOTES
Pt admitted to [facility]. Pt is unresponsive, currently on 10L/min O2. Gayle in place, chest tube in place. Will administer ativan PRN for comfort. Bed in lowest position, wheels locked. Bed alarm on.

## 2021-01-17 NOTE — ED NOTES
This RN spoke to Langley Runner (son).  RN transferred son to clinical  at this time per her request.      Florentin , RN  01/17/21 8281

## 2021-01-17 NOTE — PROGRESS NOTES
Pt unresponsive. VSS. Rhonchi auscultated bilaterally. Chest tube in place. Pt appears to be comfortable and quiet. Assessment is as charted. Will continue to monitor.      Electronically signed by Jo Medley RN on 1/17/2021 at 6:46 AM

## 2021-01-17 NOTE — ED NOTES
Pt placed on non rebreather upon arrival O2 88-89% on 5 L nasal cannula.       Fredy Berkowitz RN  01/17/21 5537

## 2021-01-17 NOTE — CARE COORDINATION
Called by Juan Daniel Liu for [facility]. Pt is in ambulance en route to hospice IPU. She has spoken to pt dtr [facility], who indicates she does not agree with DNR CC status and placement in hospice IPU. Further states that appropriate consents were not obtained, that one person who gave consent via telephone to hospice liason is not who they said they were. Insists that pt be returned to [facility] and readmitted for aggressive care. Livia Gilbert advises that this dissent amongst the 5 children is not able to resolved at this time, and pt will be turned away from admission to [facility] and transport directed back to ER. Unclear if pt will return to [facility] ER or other. Dtr Summer has requested pt be changed back to full code and aggressive measures taken. Hospice [place] [address] states that it is possible another referral to [facility] would be declined as there is continued dissension among the children.

## 2021-01-17 NOTE — PROGRESS NOTES
Pt's family, son/daughter calling multiple times. Already explained that we have no visitation policy. Family is very upset. Relayed situation to charge nurse and asked for assistance. Family would like to speak to whoever is in charge of no visitation policy.

## 2021-01-17 NOTE — PROGRESS NOTES
Received call from daughter Ella Lee. Called from [phone]. Confirms that she is agreeable to [facility] status ( no intubation, no CPR, no ACLS meds). Also agreeable to no lab draws and focus on comfort with control or pain and anxiety. Would prefer that patient go to hospice as visitation status is more lax. Also understands that patient sons prefer that patient stay in hospital. King Gareth will discuss with family re hospice setting and hopes to achieve family agreement. Will notify nursing staff if hospice becomes family plan.

## 2021-01-17 NOTE — PROGRESS NOTES
Pt still unresponsive, using accessory muscles to breathe. Morphine administered for pain. Mouth care provided. Repositioned for comfort. Bed in lowest position, wheels locked.

## 2021-01-17 NOTE — ED NOTES
Per Risk management, Jonathan Pete [phone], this hospital is to honor signed and received DNR-CC until 3 of pt siblings agree otherwise on pt code status.       Radha Weinstein RN  01/17/21 6476

## 2021-01-17 NOTE — PROGRESS NOTES
Call to Amie Crandall, daughter at [phone]. Wants father to be a DNR-CC. No intubation, CPR, ACLS meds. No lab draws. Wants focus to be on comfort including pain and anxiety medicine. Want patient to be admitted here for comfort care. Call to Raquel Dennis, son at [phone]. Wants father to be a DNR-CC. No intubation, CPR, ACLS meds. No lab draws. Wants focus to be on comfort including pain and anxiety medicine. Wants patient to be admitted here for comfort care. States Ratna (patient daughter) and Savanah Mews" (patient son) also agree. Call to [facility] at [phone]. Wants father to be a DNR-CC. No intubation, CPR, ACLS meds. No lab draws. Wants focus to be on comfort including pain and anxiety medicine. Wants patient to be admitted here for comfort care.

## 2021-01-17 NOTE — PROGRESS NOTES
Just talked to case management about hospice services for pt. Case management will try and reach hospice [facility].

## 2021-01-17 NOTE — PROGRESS NOTES
D/cd R radial sena per withdraw guidelines. Manual pressure until hemostasis achieved. Dry dsg applied. Radial pulse palpable.   Viry Avila

## 2021-01-18 NOTE — PROGRESS NOTES
Hospitalist Progress Note      PCP: No primary care provider on file. Date of Admission: 1/17/2021    Chief Complaint: Patient not communicative and return back from recent discharge to hospice    Subjective: Seen and examined with RN at bedside. Currently patient continues to be noncommunicative and resting. Continue to be with intermittent hypoxia and requiring up to 15 L/min high flow nasal cannula oxygen.   -Discussed with RN regarding care plan-plan to obtain palliative care consultation and to arrange family meeting to discuss final goals of care including recent hospice discharge. Medications:  Reviewed    Infusion Medications    sodium chloride 60 mL/hr at 01/17/21 2150     Scheduled Medications    sodium chloride flush  10 mL Intravenous 2 times per day    enoxaparin  40 mg Subcutaneous Daily    aspirin  81 mg Oral Daily    cefepime  1 g Intravenous Q24H     PRN Meds: sodium chloride flush, promethazine **OR** ondansetron, acetaminophen **OR** acetaminophen, polyethylene glycol, LORazepam, promethazine      Intake/Output Summary (Last 24 hours) at 1/18/2021 0852  Last data filed at 1/18/2021 7877  Gross per 24 hour   Intake 10 ml   Output 150 ml   Net -140 ml       Physical Exam Performed:    /64   Pulse 79   Temp 97.4 °F (36.3 °C) (Axillary)   Resp 18   SpO2 94%     General appearance: Ill looking, noncommunicative, lethargic  HEENT: Pupils equal, round, and reactive to light. Conjunctivae/corneas clear. Neck: Supple, with full range of motion. No jugular venous distention. Trachea midline. Respiratory: Tachypneic , sonorous, crackles bilaterally with transmitted upper airway sounds and reduced breath sounds right. Cardiovascular:  Regular rate and rhythm with normal S1/S2 without murmurs, rubs or gallops. Abdomen: Soft, non-tender, non-distended with normal bowel sounds. Musculoskeletal:  No clubbing, cyanosis or edema bilaterally.   Full range of motion without deformity. Skin: Skin color, texture, turgor normal.  No rashes or lesions. Neurologic: Encephalopathic /hypersomnolent /poorly responsive to pain and voice . Psychiatric:  Alert and oriented, thought content appropriate, normal insight  Capillary Refill: Brisk,< 3 seconds   Peripheral Pulses: +2 palpable, equal bilaterally       Labs:   Recent Labs     01/16/21  0734   WBC 9.5   HGB 6.7*   HCT 21.0*   PLT 73*     Recent Labs     01/16/21  0405      K 4.8  4.8   *   CO2 21   BUN 84*   CREATININE 3.1*   CALCIUM 8.6   PHOS 4.0     Recent Labs     01/16/21  0405   AST 37   ALT 20   BILITOT 0.7   ALKPHOS 59     No results for input(s): INR in the last 72 hours. Recent Labs     01/15/21  1618 01/17/21  1850 01/17/21  2117   TROPONINI <0.01 0.03* 0.02*       Urinalysis:    Lab Results   Component Value Date    NITRU POSITIVE 12/23/2020    WBCUA 21-50 12/23/2020    BACTERIA 3+ 12/23/2020    RBCUA 3-4 12/23/2020    BLOODU SMALL 12/23/2020    SPECGRAV 1.015 12/23/2020    GLUCOSEU Negative 12/23/2020     Active Hospital Problems    Diagnosis Date Noted    Infective endocarditis [I33.0] 01/17/2021     Assessment/Plan:  1. Hepatic encephalopathy -cysts  -Continue lactulose  -Primary team to decide in the morning if neurology and GI need to be reconsulted based on what the family wants for this gentleman.     2. Infective endocarditis-Complicated by septic pulmonary emboli.  -Has known MRSA bacteremia  -We will restart vancomycin, pharmacy to  Dose &  cefepime  -Antibiotics to be discontinued if family or next of kin decide exactly what the goals of care are.  -Cardiology/CT surgery consulted (last admission)  but no role for acute surgical intervention or MAXI since acute surgery not indicated. C4/7 discitis noted.      3. Sepsis - w/ resolving septic shock- No Leukocytosis/Fever . Has tachycardia/tachypnea/ hypotension/ multi-organ failure. Will fluid resuscitate     4.  Acute Respiratory Failure - w/ hypoxia, POArrival.  Supplemental O2 and wean as tolerated. will reconsult Pulmonology     5.  R multiloculated pleual effusion noted. Right intercostal chest drain in situ.         6. Cirrhosis - w/ decompensation.    GI to be reconsulted in the morning if POA wish.     7. ARF/CKD -likely a complication of sepsis/ prerenal  History of  R Nephrectomy.        8. Anemia - etiology clinically unable to determine, w/out evidence of active bleeding/hemolysis. Stable and asymptomatic w/out indication for transfusion.      DVT Prophylaxis: Heparin 5000 units SQ  Diet: DIET GENERAL; No Caffeine  Code Status: DNR-CC    PT/OT Eval Status: N/A     Dispo - Likely in next few days once final decision made by family . Palliative care consulted        The note was completed using Dragon -speech recognition software & EMR  . Every effort was made to ensure accuracy; however, inadvertent computerized transcription errors may be present.     Luh Quintero MD

## 2021-01-18 NOTE — CARE COORDINATION
Chart reviewed. Patient unresponsive. On 13 LHFO2 at 93%. Family undecided about plan of care. Hospice vs unknown plan of care. Palliative care consult pending.  Sofie Parikh RN

## 2021-01-18 NOTE — DISCHARGE SUMMARY
Hospital Medicine Discharge Summary    Patient ID: Albert Carmona      Patient's PCP: No primary care provider on file. Admit Date: 1/15/2021     Discharge Date: 1/17/2021      Admitting Physician: Vanetta Lesch, MD     Discharge Physician: Chantell Anaya MD     Discharge Diagnoses: Active Hospital Problems    Diagnosis    Thrombocytopenia ([facility] [D69.6]    ABIOLA (acute kidney injury) ([facility] [N17.9]    Pulmonary infiltrates [R91.8]    Normocytic normochromic anemia [D64.9]    Septic shock ([facility] [A41.9, R65.21]    Hypothyroidism [E03.9]    Acute encephalopathy [G93.40]    Acute respiratory failure with hypoxia and hypercapnia (HCC) [J96.01, J96.02]    HTN (hypertension), benign [I10]    IVDA (intravenous drug abuse) complicating pregnancy ([facility] [O99.320, F19.10]    Endocarditis [I38]    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) ([facility] [B19.20, E46.69]       The patient was seen and examined on day of discharge and this discharge summary is in conjunction with any daily progress note from day of discharge. Hospital Course:                Hepatic encephalopathy - per GI note, (Stage 1) in setting of acute infective endocarditis.  Started Lactulose and resolved - continue. Neurology consulted and appreciated.      Endocarditis - acute bacterial w/ Staph likely 2nd to hx IVDU.  W/ septic pulmonary emboli due to  R-sided endocarditis.  Vancomycin started last admission 23 Dec. Echo 24 Dec w/ preserved EF 55% and large mobile vegetation on tricuspid valve.  Infectious Disease consulted last admission - reconsulted and appreciated s/p PICC placed 26 Dec.  Cardiology/CT surgery consulted but no role for acute surgical intervention or MAXI since acute surgery not indicated.  C4/7 discitis noted.     Sepsis - w/ septic shock POArrival.  Leukocytosis/Tachycardia/Fever/Elevated Lactate POArrival 2nd to above infection. Continued IVF as appropriate and monitored clinical response w/ ABX as written.      Acute Respiratory Failure - w/ hypoxia, POArrival.  Supplemental O2 and wean as tolerated. Intubated and Pulmonology following - extubated 16 Jan. R multiloculated pleual effusion noted.          Cirrhosis - w/ decompensation. GI consulted and appreciated now signed off and agree     ARF/CKD - w/ elevated BUN/Cr ratio c/w pre-renal azotemia 2nd to sepsis - s/p prior R Nephrectomy. Given IVF hydration and follow serial labs. Nephrology consulted and appreciated.           Anemia - etiology clinically unable to determine, w/out evidence of active bleeding/hemolysis. Stable and asymptomatic w/out indication for transfusion.        Labs: For convenience and continuity at follow-up the following most recent labs are provided:      CBC:    Lab Results   Component Value Date    WBC 9.5 01/16/2021    HGB 6.7 01/16/2021    HCT 21.0 01/16/2021    PLT 73 01/16/2021       Renal:    Lab Results   Component Value Date     01/16/2021    K 4.8 01/16/2021    K 4.8 01/16/2021     01/16/2021    CO2 21 01/16/2021    BUN 84 01/16/2021    CREATININE 3.1 01/16/2021    CALCIUM 8.6 01/16/2021    PHOS 4.0 01/16/2021         Significant Diagnostic Studies    Radiology:   MRI BRAIN WO CONTRAST   Final Result   1. No acute intracranial abnormality. Specifically, no acute infarction. 2. Mild parenchymal volume loss. Minimal periventricular white matter signal   abnormality compatible with chronic microvascular ischemic changes. XR CHEST PORTABLE   Final Result   Persistent patchy multifocal nodular opacity within the mid to lower lung   zones which can reflect pneumonia in the appropriate clinical setting. Small   pleural effusions.                 Consults:     IP CONSULT TO PULMONOLOGY IP CONSULT TO INFECTIOUS DISEASES  PHARMACY TO DOSE VANCOMYCIN  IP CONSULT TO CARDIOTHORACIC SURGERY  IP CONSULT TO NEPHROLOGY  IP CONSULT TO PHARMACY    Disposition: Hospice     Condition at Discharge: Terminal    Code Status:  DNR    Activity: activity as tolerated    Diet: regular diet      Discharge Medications:     Discharge Medication List as of 1/17/2021 11:09 AM          Time Spent on discharge is more than 30 minutes in the examination, evaluation, counseling and review of medications and discharge plan.       Signed:    Atiya Lacey MD   1/18/2021

## 2021-01-18 NOTE — PROGRESS NOTES
Messaged PM NP to d/c tele order since pt is DNR-cc and family wants no labs drawn only antibiotics. Per PM NP okay to d/c tele order.

## 2021-01-18 NOTE — PROGRESS NOTES
Pt assessment completed and charted. VSS. Pt unresponive, MARIAJOSE orientation. Pt very swollen in all exerimites. Pt has podus boots in place. Pillow support in place, pt turned q2/hr. Oral care preformed. Pt on 8L high flow NC. Pt has varried from 5L NC to 15L high flow NC. Pt has been calm and resting most of evening. Pt does moan when moved. Pt has opened eyes a few times when writer is readjusting pt. Pt is a fall risk;  -Bed in lowest position and wheels locked.    -Call light within reach.   -Bedside table within reach.   -bed check in place

## 2021-01-18 NOTE — CONSULTS
Palliative Care Initial Note  Palliative Care Admit date:  1/18/21  Reason for c/s:  GOC, Code status    Advance Directives:  Pt has not executed AD's. He has five living children:  David, Lexis, Elver, Bakari and Ivette Gonzalez has verified that they are viewed collective as pts NOK. Pt has a DNR-CC code status. Plan of care/goals:  Have spoken via phone w/ David, Lexis, Elver and Viri. Each adult child had the opportunity to ask questions and hear updated report of pts status and the documented input of providers around pts options for care and px. Writer had to leave a message for Snehal requesting a return call. While Molina were quick to verb their wish for comfort care on pts behalf, Lexis and David asked pertinent questions and feel, if there were certainty that he could recover, they would want to remain aggressive. However, in light of pts MSOF and the significance of his medical challenges, they also expressed their wish that pt not \"suffer more\" and verb'd their readiness to support the decision for comfort care and hospice (that said, Lexis's spouse was heard in the background cursing and expressing disdain for hospice care). All four children are agreeable to having hospice though, if death were thought to be imminent, they would support his staying here. Ratna and Elizabeth prefer the hospice [facility] since they are accepting visitors for pts. Writer did speak w/ Bakari this afternoon and he stated \"if it weren't for my grandmother wanting to let my dad go comfortably and peacefully, I'd say keep letting him fight. \"  However, Snehal wants most to honor his grandmother's wish for comfort care and, to that end, will support comfort care now w/ hospice. \"    Social/Spiritual:   Much support in conversation w/ each of pts children.   They were honest in expressing that they'd prefer he not be appropriate for hospice but, in light of pts MSOF condition, they prefer comfort measures so he can die naturally and peacefully. Plan: The family would like to have pt considered for the [facility] and, if death is imminent, keep him here to die. HOC will f/u w/ Elizabeth to arrange mtg for 1/19 as hospice does not have availabilty tonight.             Reason for consult:  ___ Advance Care Planning  _X_ Transition of Care Planning  _X_ Psychosocial/Spiritual Support  ___ Symptom Management                                                                                                                Arabella Sparks RN

## 2021-01-18 NOTE — PLAN OF CARE
Problem: Falls - Risk of:  Goal: Absence of physical injury  Description: Absence of physical injury  Outcome: Ongoing     Problem: Skin Integrity:  Goal: Absence of new skin breakdown  Description: Absence of new skin breakdown  Outcome: Ongoing

## 2021-01-18 NOTE — PROGRESS NOTES
Message sent to MD \"Pts platelet count was 73 on 1/16. Do you want heparin given or held? Also, I have tried to call palliative care nurse but no answer, she may be off today for the holiday. \"    MD called and said to hold heparin for today.

## 2021-01-18 NOTE — ED PROVIDER NOTES
[facility] C3 TELE/MED SURG/ONC  EMERGENCYDEPARTMENT ENCOUNTER      Pt Name: Belen Koehler  MRN: [id number]  [place] 1965  Date of evaluation: 1/17/2021  Juan Jose Barrios MD    CHIEF COMPLAINT       Chief Complaint   Patient presents with    Other         HISTORY OF PRESENT ILLNESS   (Location/Symptom, Timing/Onset,Context/Setting, Quality, Duration, Modifying Factors, Severity)  Note limiting factors. Belen Koehler is a 60 y.o. male with history of infective endocarditis, PE, IV drug abuse who presents to the emergency department after being turned around from hospice center. Per EMS patient was just discharged they were on their way to hospice when they arrived, they were informed that hospice care was retracted by family and to go back to the hospital.  Upon arrival patient is unresponsive, with a chest tube, satting 89% on 5 L nasal cannula. Per EMS this is how they picked up patient, no acute change. HPI    Nursing Notes were reviewed. REVIEW OF SYSTEMS    (2-9 systems for level 4, 10 or more for level 5)     Review of Systems   Unable to perform ROS: Patient unresponsive       Except as noted above the remainder of the review of systems was reviewedand negative. PAST MEDICAL HISTORY     Past Medical History:   Diagnosis Date    Back pain     Hepatitis B surface antigen positive 08/31/2017    Hepatitis C antibody positive in blood 08/31/2017    MRSA (methicillin resistant staph aureus) culture positive 12/25/2020    bactermia         SURGICAL HISTORY       Past Surgical History:   Procedure Laterality Date    KIDNEY REMOVAL Right     WRIST SURGERY           CURRENT MEDICATIONS     There are no discharge medications for this patient. ALLERGIES     Codeine    FAMILY HISTORY     No family history on file. SOCIAL HISTORY       Social History     Socioeconomic History    Marital status:       Spouse name: Not on file    Number of children: Not on file    Years of education: Not on file    Highest education level: Not on file   Occupational History    Not on file   Social Needs    Financial resource strain: Not on file    Food insecurity     Worry: Not on file     Inability: Not on file    Transportation needs     Medical: Not on file     Non-medical: Not on file   Tobacco Use    Smoking status: Current Every Day Smoker     Packs/day: 1.00     Types: Cigarettes    Smokeless tobacco: Never Used   Substance and Sexual Activity    Alcohol use: No    Drug use: Yes     Types: IV, Cocaine, Opiates , Marijuana     Comment: on subutex now.  Sexual activity: Yes     Partners: Female   Lifestyle    Physical activity     Days per week: Not on file     Minutes per session: Not on file    Stress: Not on file   Relationships    Social connections     Talks on phone: Not on file     Gets together: Not on file     Attends Rastafari service: Not on file     Active member of club or organization: Not on file     Attends meetings of clubs or organizations: Not on file     Relationship status: Not on file    Intimate partner violence     Fear of current or ex partner: Not on file     Emotionally abused: Not on file     Physically abused: Not on file     Forced sexual activity: Not on file   Other Topics Concern    Not on file   Social History Narrative    Not on file       SCREENINGS    [place] Coma Scale  Eye Opening: None  Best Verbal Response: None  Best Motor Response: None  [place] Coma Scale Score: 3        PHYSICAL EXAM    (up to 7 for level 4, 8 ormore for level 5)     ED Triage Vitals [01/17/21 1330]   BP Temp Temp Source Pulse Resp SpO2 Height Weight   126/71 97.8 °F (36.6 °C) CORE 118 (!) 36 (!) 88 % -- --       Physical Exam  Constitutional:       Appearance: He is ill-appearing. HENT:      Head: Normocephalic and atraumatic. Eyes:      Pupils: Pupils are equal, round, and reactive to light. Cardiovascular:      Rate and Rhythm: Tachycardia present. Pulmonary:      Breath sounds: Rhonchi present. Chest:      Comments: Right-sided chest tube  Skin:     Comments: Right femoral central line         DIAGNOSTIC RESULTS     EKG: All EKG's are interpreted by the Emergency Department Physicianwho either signs or Co-signs this chart in the absence of a cardiologist.      RADIOLOGY:   Non-plain film images such as CT, Ultrasound and MRI are read by the radiologist. Plain radiographic images are visualized and preliminarily interpreted by the emergency physician with the below findings:      Interpretation per the Radiologist below, if available at the time of this note:    No orders to display         ED BEDSIDE ULTRASOUND:   Performed by ED Physician - none    LABS:  Labs Reviewed   TROPONIN - Abnormal; Notable for the following components:       Result Value    Troponin 0.03 (*)     All other components within normal limits    Narrative:     Performed at:  [place] [address] [address]   Phone [phone]   TROPONIN - Abnormal; Notable for the following components:    Troponin 0.02 (*)     All other components within normal limits    Narrative:     Performed at:  [place] [address] [address]   Phone [phone]   CBC       All other labs were within normal range ornot returned as of this dictation.     EMERGENCY DEPARTMENT COURSE and DIFFERENTIAL DIAGNOSIS/MDM:   Vitals:    Vitals:    01/17/21 1725 01/17/21 1739 01/17/21 1754 01/17/21 1809   BP: 120/70 114/69 116/72 115/72   Pulse: 115 114 115 114   Resp: 23 24 24 23   Temp:       TempSrc:       SpO2: 97% 98% 98% 99%         MDM    ED COURSE/MDM    -Sakina Gongora is a 60 y.o. male with a history of septic emboli, IV drug use, endocarditis who was just discharged earlier this morning to hospice after withdrawing care however was turned back around and was told to go back to the hospital as family had called and withdrawn care to hospice.  -Was a very complicated situation in which patient has 5 children, no POA and therefore decision initially to become DNR CC and go to hospice was done by majority of 3 out of 5 children. However there appeared to be a change of wishes by some children and therefore patient was brought back to the hospital.  He is currently DNR CC with paperwork that attest to that in the ER. He arrived 89% on 5 L and therefore was switched to a nonrebreather. No other care or work-up was done until we could talk to family and figure out what they would like to be done as they have seem to change their mind. Clinical was alerted and the legal team was also made aware. They stated that they will get in touch with family and clarify their wishes. Ultimately it was decided by family that they would like for patient to remain DNR CC however does not want him to go to hospice or get any lab work but would rather like for him to get treatment and or comfort care. This appears to be contradictory as he would not be able to receive treatment without further work-up and hospice seems appropriate if family is looking for patient to be comfortable. This was discussed with the hospitalist who then spoke with clinical as well. It was decided that patient will be admitted to further clear up and clarify the situation, family's wishes and whether or not further treatment will be pursued for family. REASSESSMENT      Unchanged as noted above    CRITICAL CARE TIME   Total Critical Care time was 25 minutes, excluding separately reportableprocedures. There was a high probability of clinicallysignificant/life threatening deterioration in the patient's condition which required my urgent intervention. CONSULTS:  PHARMACY CONSULT FOR RENAL DOSING    PROCEDURES:  Unless otherwise noted below, none     Procedures    FINAL IMPRESSION      1.  Infective endocarditis, due to unspecified organism, unspecified chronicity          DISPOSITION/PLAN   DISPOSITION Admitted 01/17/2021 04:37:32 PM      PATIENT REFERREDTO:  No follow-up provider specified. DISCHARGE MEDICATIONS:  There are no discharge medications for this patient.          (Please note that portions of this note were completed with a voice recognition program.  Efforts were made to edit the dictations but occasionally wordsare mis-transcribed.)    Fadumo Swartz MD (electronically signed)  Attending Emergency Physician            Fadumo Swartz MD  01/17/21 1287

## 2021-01-19 LAB
BLOOD CULTURE, ROUTINE: NORMAL
CULTURE, BLOOD 2: NORMAL

## 2021-01-19 NOTE — PROGRESS NOTES
Spoke with Charlotte Hanks at [facility] \[facility]\"" and she said pt is ok to be released

## 2021-01-19 NOTE — PROGRESS NOTES
RN on B2 called and said they found a cell phone with Yumiko Simpson. Isabel's name on it at their nurses station. Received bag with pt name on it and placed a tag on with. Called security to let them know we found pt belongings and placed a tag on them and they can pick them up before  home comes.

## 2021-01-24 NOTE — DISCHARGE SUMMARY
Hospital Medicine Discharge (Death) Summary    Patient ID: Domenic Rico      Patient's PCP: No primary care provider on file. Admit Date: 1/17/2021     Discharge Date: 1/18/2021    Admitting Physician: Marily Lamb MD     Discharge Physician: Kimberly Adkins MD     Discharge Diagnoses: Active Hospital Problems    Diagnosis    Infective endocarditis [I33.0]       The patient was seen and examined on day of discharge and this discharge summary is in conjunction with any daily progress note from day of discharge. Hospital Course: By Problem list  1. Hepatic encephalopathy -cysts  -Continued lactulose    2.  Infective endocarditis-Complicated by septic pulmonary emboli.  -Has known MRSA bacteremia  - Started on vancomycin, pharmacy to  Dose &  cefepime  -Antibiotics  discontinued  Once family or next of kin decided Hospice   -Cardiology/CT surgery consulted (last admission)  but no role for acute surgical intervention or MAXI since acute surgery not indicated. C4/7 discitis noted.      3. Sepsis - w/ resolving septic shock- No Leukocytosis/Fever . Has tachycardia/tachypnea/ hypotension/ multi-organ failure. Received fluid resuscitation     4. Acute Respiratory Failure - w/ hypoxia, POArrival.  Supplemental O2 . Pulmonology followed and assisted with management    5.  R multiloculated pleual effusion noted. Right intercostal chest drain in situ.         6. Cirrhosis - w/ decompensation.       7. ARF/CKD -likely a complication of sepsis/ prerenal  History of  R Nephrectomy.        8. Anemia - etiology clinically unable to determine, w/out evidence of active bleeding/hemolysis. Stable and asymptomatic w/out indication for transfusion.     Patient's family opted for hospice care. [facility] consulted. Evaluated and accepted patient to inpatient hospice. Patient passed away prior to transfer to hospice inpatient unit.     Date of Death: 1/18/2021  Time of Death: 1754    Immediate Cause of Death: Infective endocarditis complicated by septic pulmonary emboli  Underlying Conditions: Sepsis with MRSA bacteremia  Other Contributing Conditions: Acute hypoxic respiratory failure, decompensated cirrhosis due to hepatic cysts, hepatic encephalopathy, acute on chronic kidney disease and anemia         Physical Exam Performed:   /63   Pulse 108   Temp 97.8 °F (36.6 °C) (Axillary)   Resp 20   SpO2 93%         Labs: For convenience and continuity at follow-up the following most recent labs are provided:      CBC:    Lab Results   Component Value Date    WBC 9.5 2021    HGB 6.7 2021    HCT 21.0 2021    PLT 73 2021       Renal:    Lab Results   Component Value Date     2021    K 4.8 2021    K 4.8 2021     2021    CO2 21 2021    BUN 84 2021    CREATININE 3.1 2021    CALCIUM 8.6 2021    PHOS 4.0 2021       Consults:   PHARMACY CONSULT FOR RENAL DOSING  IP CONSULT TO PALLIATIVE CARE  IP CONSULT TO HOSPICE    Disposition:     Discharge Medications: There are no discharge medications for this patient. Time Spent on discharge is more than 30 minutes in the examination, evaluation, counseling and review of medications and discharge plan. Signed:    Sanna Kaiser MD   2021      Thank you No primary care provider on file. for the opportunity to be involved in this patient's care. If you have any questions or concerns please feel free to contact me at [phone].

## 2021-01-27 NOTE — ADT AUTH CERT
1/18/2021 10:55 AM EST by Wilfredo Sheets             per ER notes- patient recently d/c'd to Hospice due to infective endocarditis; confusion with POA/legal decisions so pt  back to hospital.

## 2023-02-22 NOTE — PROGRESS NOTES
Changed vazquez catheter to temp sensing catheter. 92.0 temp. Initialed mistral air on high heat.  Tyler Peres O-Z Flap Text: The defect edges were debeveled with a #15 scalpel blade. Given the location of the defect, shape of the defect and the proximity to free margins an O-Z flap was deemed most appropriate. Using a sterile surgical marker, an appropriate transposition flap was drawn incorporating the defect and placing the expected incisions within the relaxed skin tension lines where possible. The area thus outlined was incised deep to adipose tissue with a #15 scalpel blade. The skin margins were undermined to an appropriate distance in all directions utilizing iris scissors. Following this, the designed flap was placed into the primary defect and sutured into place.